# Patient Record
Sex: FEMALE | Race: WHITE | NOT HISPANIC OR LATINO | Employment: OTHER | URBAN - METROPOLITAN AREA
[De-identification: names, ages, dates, MRNs, and addresses within clinical notes are randomized per-mention and may not be internally consistent; named-entity substitution may affect disease eponyms.]

---

## 2017-01-06 ENCOUNTER — OFFICE VISIT (OUTPATIENT)
Dept: MAMMOGRAPHY | Facility: CLINIC | Age: 82
End: 2017-01-06

## 2017-01-06 VITALS
TEMPERATURE: 97.5 F | HEART RATE: 83 BPM | OXYGEN SATURATION: 99 % | DIASTOLIC BLOOD PRESSURE: 72 MMHG | SYSTOLIC BLOOD PRESSURE: 125 MMHG

## 2017-01-06 DIAGNOSIS — R23.4 BREAST SKIN CHANGES: Primary | ICD-10-CM

## 2017-01-06 PROCEDURE — 99213 OFFICE O/P EST LOW 20 MIN: CPT | Performed by: SURGERY

## 2017-01-06 NOTE — LETTER
January 6, 2017     Gutierrez Jha MD  [email or web address] [place] [address]    Patient: Vicky Stewart   YOB: 1933   Date of Visit: 1/6/2017       Dear Dr. Abhijeet MD:    Thank you for referring Vicky Stewart to me for evaluation. Below are the relevant portions of my assessment and plan of care.    If you have questions, please do not hesitate to call me. I look forward to following Vicky along with you.         Sincerely,        Melquiades Olson MD        CC: No Recipients  Melquiades Olson MD  1/6/2017 10:19 AM  Signed  Subjective   Vicky Stewart is a 83 y.o. female     History of Present Illness I last saw her in September following a skin punch biopsy of the left breast for some persistent redness of the skin.  The pathology report did not show anything to suggest inflammatory cancer of the breast.  She is actually seen her dermatologist since then.  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  After conferring with the pathologists it was suggested that a 6 mm skin punch biopsy be performed to get a better section of tissue.  This would be done to mainly confirm the diagnosis of dermal angiomatosis but this is a benign situation and there was not much to do about it anyway.  The patient does feel that the right breast is also demonstrating some of the same faint redness.  She has not detected any changes to the nipple areolar complex or felt any masses.  She is due to get her screening mammograms at the end of this month.        Review of Systems  Past Medical History   Diagnosis Date   • Anemia    • Atrial fibrillation    • Atrial flutter    • AV block, 3rd degree    • Carotid artery stenosis    • Dermatitis    • GERD (gastroesophageal reflux disease)    • History of EKG 06/17/2016   • Hyperlipidemia    • Hypertension    • Osteoarthritis      knee   • Osteoarthritis    • Pacemaker    • Palpitations    • Raynaud's phenomenon    • SOB  (shortness of breath) on exertion    • SSS (sick sinus syndrome)    • URI, acute    • Venous insufficiency      Past Surgical History   Procedure Laterality Date   • Cholecystectomy     • Coronary stent placement       choledochal   • Colonoscopy  09/2012   • Other surgical history       interrogation of implantable loop recorder remote, up to 30 days   • Cardiac electrophysiology procedure N/A 9/14/2016     Procedure: Lead Revision PPM BOSTON;  Surgeon: Kale Méndez MD;  Location: [facility] INVASIVE LOCATION;  Service:    • Sinus surgery       Family History   Problem Relation Age of Onset   • Heart disease Father    • Diabetes Sister    • Hodgkin's lymphoma Sister    • Heart disease Other    • Stroke Other    • Endometrial cancer Sister      Social History     Social History   • Marital status:      Spouse name: N/A   • Number of children: N/A   • Years of education: N/A     Occupational History   • Not on file.     Social History Main Topics   • Smoking status: Former Smoker     Packs/day: 1.50     Start date: 1951     Quit date: 2011   • Smokeless tobacco: Never Used      Comment: SMOKED 1 1/2 PACKS A DAY FOR 45YEARS  QUIT SMOKING 5 YEARS AGO   • Alcohol use No   • Drug use: No   • Sexual activity: Defer      Comment:      Other Topics Concern   • Not on file     Social History Narrative       Objective   Physical Exam  Gen.-well-nourished well-developed obese white female in no acute distress  Left breast-there continues to be some faint redness from the 7 to 10:00 positions.  This does not kathia easily and I do not detect any peau d'orange or skin thickening or changes to the appearance of the nipple areolar complex.  There is also no evidence of axillary lymphadenopathy.  Right breast-there is faint redness from the 2 to 5:00 positions of this breast.  Again it is not blanching and there are a few visible telangiectatic vessels superficially and the skin of this area.  I do not detect any  evidence of peau d'orange or skin thickening.  The axilla is also free of lymphadenopathy  Extremities-there is no evidence of upper extremity lymphedema on either side    Assessment/Plan  this picture certainly does not seem consistent with inflammatory cancer of the breast.  The fact that we are seeing changes in the other breast is actually probably more reassuring that it is concerning.  I would certainly not do anything at this point in time until her yearly mammography is completed.  That could potentially direct us to a more specific area.  We can always consider doing a 6 mm skin punch biopsy but for now I would wait and evaluate her breasts.  She will call me after the mammograms are completed so we can discuss the findings and plan our next steps.  The encounter diagnosis was Breast skin changes.

## 2017-01-06 NOTE — MR AVS SNAPSHOT
Vicky GASTELUM Pat   1/6/2017 9:45 AM   Office Visit    Dept Phone:  [phone]   Encounter #:  [id number]    Provider:  Melquiades Olson MD   Department:  [facility] BREAST SURGERY                Your Full Care Plan              Your Updated Medication List          This list is accurate as of: 1/6/17 10:07 AM.  Always use your most recent med list.                acetaminophen 325 MG tablet   Commonly known as:  TYLENOL       atorvastatin 40 MG tablet   Commonly known as:  LIPITOR       BREO ELLIPTA 100-25 MCG/INH aerosol powder    Generic drug:  Fluticasone Furoate-Vilanterol       carbidopa-levodopa  MG per tablet   Commonly known as:  SINEMET       fluticasone 50 MCG/ACT nasal spray   Commonly known as:  FLONASE       furosemide 40 MG tablet   Commonly known as:  LASIX   TAKE 1 TABLET EVERY DAY       loratadine 10 MG tablet   Commonly known as:  CLARITIN       losartan 100 MG tablet   Commonly known as:  COZAAR       omeprazole 20 MG capsule   Commonly known as:  priLOSEC       pyridoxine 200 MG tablet   Commonly known as:  VITAMIN B-6       saccharomyces boulardii 250 MG capsule   Commonly known as:  FLORASTOR       temazepam 15 MG capsule   Commonly known as:  RESTORIL       VENTOLIN  (90 BASE) MCG/ACT inhaler   Generic drug:  albuterol       vitamin D3 5000 UNITS capsule capsule       warfarin 5 MG tablet   Commonly known as:  COUMADIN   TAKE ONE TABLET BY MOUTH DAILY AS DIRECTED               Instructions     None    Patient Instructions History      Upcoming Appointments     Visit Type Date Time Department    FOLLOW UP 1/6/2017  9:45 AM [facility] BREAST SURG [facility]    CT [facility] CHEST WO CONTRAST 1/11/2017 11:00 AM  [facility] CT    PACEMAKER ICD - REMOTE 1/27/2017 12:00 AM [facility]    FOLLOW UP 10/27/2017 11:00 AM [facility] [place]      MyChart Signup     [facility] MyChart allows you to send messages to your doctor, view your test results,  renew your prescriptions, schedule appointments, and more. To sign up, go to [email or web address] and click on the Sign Up Now link in the New User? box. Enter your [employer] Activation Code exactly as it appears below along with the last four digits of your Social Security Number and your Date of Birth () to complete the sign-up process. If you do not sign up before the expiration date, you must request a new code.    S[employer]ie Activation Code: [email or web address]  Expires: 2017 10:07 AM    If you have questions, you can email [email or web address] or call [phone] to talk to our [employer] staff. Remember, S[employer]ie is NOT to be used for urgent needs. For medical emergencies, dial 911.               Other Info from Your Visit           Your Appointments     2017 11:00 AM EST   CT gayle chest wo contrast with GAYLE CT 3   [facility] CT ([place])    [address] [facility] [address] [phone]           Bring current list of meds Please arrive 15 minutes prior to exam            2017 12:00 AM EST   PACEMAKER - REMOTE with RUTHIE GAMINO [facility] CARDIOLOGY (--)    [address] [id number] [phone]            Oct 27, 2017 11:00 AM EDT   Follow Up with Kale Méndez MD   [facility] CARDIOLOGY (--)    [address] [id number] [phone]           Arrive 15 minutes prior to appointment.              Allergies     Codeine      Penicillins      Sulfa Antibiotics        Reason for Visit     Follow-up red breast      Vital Signs     Blood Pressure Pulse Temperature Oxygen Saturation Smoking Status       125/72 (BP Location: Left arm) 83 97.5 °F (36.4 °C) 99% Former Smoker

## 2017-01-06 NOTE — PROGRESS NOTES
Subjective   Vicky Stewart is a 83 y.o. female     History of Present Illness I last saw her in September following a skin punch biopsy of the left breast for some persistent redness of the skin.  The pathology report did not show anything to suggest inflammatory cancer of the breast.  She is actually seen her dermatologist since then.  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  After conferring with the pathologists it was suggested that a 6 mm skin punch biopsy be performed to get a better section of tissue.  This would be done to mainly confirm the diagnosis of dermal angiomatosis but this is a benign situation and there was not much to do about it anyway.  The patient does feel that the right breast is also demonstrating some of the same faint redness.  She has not detected any changes to the nipple areolar complex or felt any masses.  She is due to get her screening mammograms at the end of this month.        Review of Systems  Past Medical History   Diagnosis Date   • Anemia    • Atrial fibrillation    • Atrial flutter    • AV block, 3rd degree    • Carotid artery stenosis    • Dermatitis    • GERD (gastroesophageal reflux disease)    • History of EKG 06/17/2016   • Hyperlipidemia    • Hypertension    • Osteoarthritis      knee   • Osteoarthritis    • Pacemaker    • Palpitations    • Raynaud's phenomenon    • SOB (shortness of breath) on exertion    • SSS (sick sinus syndrome)    • URI, acute    • Venous insufficiency      Past Surgical History   Procedure Laterality Date   • Cholecystectomy     • Coronary stent placement       choledochal   • Colonoscopy  09/2012   • Other surgical history       interrogation of implantable loop recorder remote, up to 30 days   • Cardiac electrophysiology procedure N/A 9/14/2016     Procedure: Lead Revision PPM [place]ON;  Surgeon: Kale Méndez MD;  Location:  INVASIVE LOCATION;  Service:    • Sinus surgery       Family History   Problem  Relation Age of Onset   • Heart disease Father    • Diabetes Sister    • Hodgkin's lymphoma Sister    • Heart disease Other    • Stroke Other    • Endometrial cancer Sister      Social History     Social History   • Marital status:      Spouse name: N/A   • Number of children: N/A   • Years of education: N/A     Occupational History   • Not on file.     Social History Main Topics   • Smoking status: Former Smoker     Packs/day: 1.50     Start date: 1951     Quit date: 2011   • Smokeless tobacco: Never Used      Comment: SMOKED 1 1/2 PACKS A DAY FOR 45YEARS  QUIT SMOKING 5 YEARS AGO   • Alcohol use No   • Drug use: No   • Sexual activity: Defer      Comment:      Other Topics Concern   • Not on file     Social History Narrative       Objective   Physical Exam  Gen.-well-nourished well-developed obese white female in no acute distress  Left breast-there continues to be some faint redness from the 7 to 10:00 positions.  This does not kathia easily and I do not detect any peau d'orange or skin thickening or changes to the appearance of the nipple areolar complex.  There is also no evidence of axillary lymphadenopathy.  Right breast-there is faint redness from the 2 to 5:00 positions of this breast.  Again it is not blanching and there are a few visible telangiectatic vessels superficially and the skin of this area.  I do not detect any evidence of peau d'orange or skin thickening.  The axilla is also free of lymphadenopathy  Extremities-there is no evidence of upper extremity lymphedema on either side    Assessment/Plan  this picture certainly does not seem consistent with inflammatory cancer of the breast.  The fact that we are seeing changes in the other breast is actually probably more reassuring that it is concerning.  I would certainly not do anything at this point in time until her yearly mammography is completed.  That could potentially direct us to a more specific area.  We can always consider  doing a 6 mm skin punch biopsy but for now I would wait and evaluate her breasts.  She will call me after the mammograms are completed so we can discuss the findings and plan our next steps.  The encounter diagnosis was Breast skin changes.

## 2017-01-11 ENCOUNTER — HOSPITAL ENCOUNTER (OUTPATIENT)
Dept: CT IMAGING | Facility: HOSPITAL | Age: 82
Discharge: HOME | End: 2017-01-11
Attending: INTERNAL MEDICINE | Admitting: INTERNAL MEDICINE

## 2017-01-11 ENCOUNTER — HOSPITAL ENCOUNTER (OUTPATIENT)
Dept: CARDIOLOGY | Facility: HOSPITAL | Age: 82
Setting detail: RECURRING SERIES
Discharge: HOME | End: 2017-01-11

## 2017-01-11 DIAGNOSIS — R91.1 LUNG NODULE: ICD-10-CM

## 2017-01-11 PROCEDURE — 36416 COLLJ CAPILLARY BLOOD SPEC: CPT | Performed by: INTERNAL MEDICINE

## 2017-01-11 PROCEDURE — 85610 PROTHROMBIN TIME: CPT | Performed by: INTERNAL MEDICINE

## 2017-01-11 PROCEDURE — 71250 CT THORAX DX C-: CPT

## 2017-01-25 ENCOUNTER — APPOINTMENT (OUTPATIENT)
Dept: WOMENS IMAGING | Facility: HOSPITAL | Age: 82
End: 2017-01-25

## 2017-01-25 PROCEDURE — G0202 SCR MAMMO BI INCL CAD: HCPCS | Performed by: RADIOLOGY

## 2017-01-27 ENCOUNTER — CLINICAL SUPPORT NO REQUIREMENTS (OUTPATIENT)
Dept: CARDIOLOGY | Facility: CLINIC | Age: 82
End: 2017-01-27

## 2017-01-27 DIAGNOSIS — I44.2 THIRD DEGREE AV BLOCK (HCC): ICD-10-CM

## 2017-01-27 DIAGNOSIS — I48.20 CHRONIC ATRIAL FIBRILLATION (HCC): Primary | ICD-10-CM

## 2017-01-27 PROCEDURE — 93296 REM INTERROG EVL PM/IDS: CPT | Performed by: INTERNAL MEDICINE

## 2017-01-27 PROCEDURE — 93294 REM INTERROG EVL PM/LDLS PM: CPT | Performed by: INTERNAL MEDICINE

## 2017-02-08 ENCOUNTER — HOSPITAL ENCOUNTER (OUTPATIENT)
Dept: CARDIOLOGY | Facility: HOSPITAL | Age: 82
Setting detail: RECURRING SERIES
Discharge: HOME | End: 2017-02-08

## 2017-02-08 PROCEDURE — 85610 PROTHROMBIN TIME: CPT

## 2017-02-08 PROCEDURE — 36416 COLLJ CAPILLARY BLOOD SPEC: CPT

## 2017-02-15 ENCOUNTER — TELEPHONE (OUTPATIENT)
Dept: MAMMOGRAPHY | Facility: CLINIC | Age: 82
End: 2017-02-15

## 2017-02-15 NOTE — TELEPHONE ENCOUNTER
The mammogram still shows a little skin thickening but it has decreased since the last image.  The patient tells me the area of redness is stable.  I have asked her to call the office tomorrow and schedule an appointment for 3 months.  She knows to call me sooner if there are any changes going on

## 2017-02-16 ENCOUNTER — HOSPITAL ENCOUNTER (OUTPATIENT)
Dept: CARDIOLOGY | Facility: HOSPITAL | Age: 82
Setting detail: RECURRING SERIES
Discharge: HOME | End: 2017-02-16

## 2017-02-16 PROCEDURE — 85610 PROTHROMBIN TIME: CPT

## 2017-02-16 PROCEDURE — 36416 COLLJ CAPILLARY BLOOD SPEC: CPT

## 2017-03-07 ENCOUNTER — HOSPITAL ENCOUNTER (OUTPATIENT)
Dept: CARDIOLOGY | Facility: HOSPITAL | Age: 82
Setting detail: RECURRING SERIES
Discharge: HOME | End: 2017-03-07

## 2017-03-07 PROCEDURE — 85610 PROTHROMBIN TIME: CPT

## 2017-03-07 PROCEDURE — 36416 COLLJ CAPILLARY BLOOD SPEC: CPT

## 2017-04-05 ENCOUNTER — HOSPITAL ENCOUNTER (OUTPATIENT)
Dept: CARDIOLOGY | Facility: HOSPITAL | Age: 82
Setting detail: RECURRING SERIES
Discharge: HOME | End: 2017-04-05

## 2017-04-05 PROCEDURE — 36416 COLLJ CAPILLARY BLOOD SPEC: CPT

## 2017-04-05 PROCEDURE — 85610 PROTHROMBIN TIME: CPT

## 2017-04-20 ENCOUNTER — TRANSCRIBE ORDERS (OUTPATIENT)
Dept: ADMINISTRATIVE | Facility: HOSPITAL | Age: 82
End: 2017-04-20

## 2017-04-20 DIAGNOSIS — M54.16 LUMBAR BACK PAIN WITH RADICULOPATHY AFFECTING LEFT LOWER EXTREMITY: Primary | ICD-10-CM

## 2017-04-20 DIAGNOSIS — M54.16 LUMBAR BACK PAIN WITH RADICULOPATHY AFFECTING RIGHT LOWER EXTREMITY: ICD-10-CM

## 2017-04-21 ENCOUNTER — HOSPITAL ENCOUNTER (OUTPATIENT)
Dept: CT IMAGING | Facility: HOSPITAL | Age: 82
Discharge: HOME | End: 2017-04-21
Attending: INTERNAL MEDICINE | Admitting: INTERNAL MEDICINE

## 2017-04-21 DIAGNOSIS — M54.16 LUMBAR BACK PAIN WITH RADICULOPATHY AFFECTING RIGHT LOWER EXTREMITY: ICD-10-CM

## 2017-04-21 DIAGNOSIS — M54.16 LUMBAR BACK PAIN WITH RADICULOPATHY AFFECTING LEFT LOWER EXTREMITY: ICD-10-CM

## 2017-04-21 PROCEDURE — 72131 CT LUMBAR SPINE W/O DYE: CPT

## 2017-04-25 ENCOUNTER — HOSPITAL ENCOUNTER (OUTPATIENT)
Dept: CARDIOLOGY | Facility: HOSPITAL | Age: 82
Setting detail: RECURRING SERIES
Discharge: HOME | End: 2017-04-25

## 2017-04-25 PROCEDURE — 36416 COLLJ CAPILLARY BLOOD SPEC: CPT

## 2017-04-25 PROCEDURE — 85610 PROTHROMBIN TIME: CPT

## 2017-04-28 ENCOUNTER — TELEPHONE (OUTPATIENT)
Dept: CARDIOLOGY | Facility: CLINIC | Age: 82
End: 2017-04-28

## 2017-04-28 ENCOUNTER — CLINICAL SUPPORT NO REQUIREMENTS (OUTPATIENT)
Dept: CARDIOLOGY | Facility: CLINIC | Age: 82
End: 2017-04-28

## 2017-04-28 DIAGNOSIS — I48.20 CHRONIC ATRIAL FIBRILLATION (HCC): ICD-10-CM

## 2017-04-28 DIAGNOSIS — I44.2 THIRD DEGREE AV BLOCK (HCC): Primary | ICD-10-CM

## 2017-04-28 PROCEDURE — 93294 REM INTERROG EVL PM/LDLS PM: CPT | Performed by: INTERNAL MEDICINE

## 2017-04-28 PROCEDURE — 93296 REM INTERROG EVL PM/IDS: CPT | Performed by: INTERNAL MEDICINE

## 2017-04-28 NOTE — TELEPHONE ENCOUNTER
Pt is pacer dependent.  She had a nst episode earlier this month.  It was 22 beats and irregular, suspicious for af.  This was the only episode she had recorded since her last check, but she did have a similar episode last check as well.  KBR

## 2017-05-05 ENCOUNTER — TRANSCRIBE ORDERS (OUTPATIENT)
Dept: PHYSICAL THERAPY | Facility: HOSPITAL | Age: 82
End: 2017-05-05

## 2017-05-05 DIAGNOSIS — M54.5 LOW BACK PAIN, UNSPECIFIED BACK PAIN LATERALITY, UNSPECIFIED CHRONICITY, WITH SCIATICA PRESENCE UNSPECIFIED: Primary | ICD-10-CM

## 2017-05-11 ENCOUNTER — HOSPITAL ENCOUNTER (OUTPATIENT)
Dept: CARDIOLOGY | Facility: HOSPITAL | Age: 82
Setting detail: RECURRING SERIES
Discharge: HOME | End: 2017-05-11

## 2017-05-11 ENCOUNTER — OFFICE VISIT (OUTPATIENT)
Dept: NEUROSURGERY | Facility: CLINIC | Age: 82
End: 2017-05-11

## 2017-05-11 VITALS
WEIGHT: 175 LBS | DIASTOLIC BLOOD PRESSURE: 88 MMHG | BODY MASS INDEX: 32.2 KG/M2 | HEART RATE: 84 BPM | HEIGHT: 62 IN | SYSTOLIC BLOOD PRESSURE: 130 MMHG

## 2017-05-11 DIAGNOSIS — M41.20 SCOLIOSIS (AND KYPHOSCOLIOSIS), IDIOPATHIC: ICD-10-CM

## 2017-05-11 DIAGNOSIS — M47.26 OSTEOARTHRITIS OF SPINE WITH RADICULOPATHY, LUMBAR REGION: Primary | ICD-10-CM

## 2017-05-11 DIAGNOSIS — M51.36 DEGENERATIVE DISC DISEASE, LUMBAR: ICD-10-CM

## 2017-05-11 PROBLEM — M51.369 DEGENERATIVE DISC DISEASE, LUMBAR: Status: ACTIVE | Noted: 2017-05-11

## 2017-05-11 PROCEDURE — 36416 COLLJ CAPILLARY BLOOD SPEC: CPT

## 2017-05-11 PROCEDURE — 85610 PROTHROMBIN TIME: CPT

## 2017-05-11 PROCEDURE — 99213 OFFICE O/P EST LOW 20 MIN: CPT | Performed by: NURSE PRACTITIONER

## 2017-05-11 RX ORDER — FUROSEMIDE 40 MG/1
TABLET ORAL
Qty: 30 TABLET | Refills: 5 | Status: SHIPPED | OUTPATIENT
Start: 2017-05-11 | End: 2017-11-27 | Stop reason: SDUPTHER

## 2017-05-19 ENCOUNTER — OFFICE VISIT (OUTPATIENT)
Dept: MAMMOGRAPHY | Facility: CLINIC | Age: 82
End: 2017-05-19

## 2017-05-19 VITALS
OXYGEN SATURATION: 95 % | HEART RATE: 78 BPM | SYSTOLIC BLOOD PRESSURE: 122 MMHG | TEMPERATURE: 97.5 F | DIASTOLIC BLOOD PRESSURE: 80 MMHG

## 2017-05-19 DIAGNOSIS — R23.4 BREAST SKIN CHANGES: Primary | ICD-10-CM

## 2017-05-19 PROCEDURE — 99213 OFFICE O/P EST LOW 20 MIN: CPT | Performed by: SURGERY

## 2017-05-30 ENCOUNTER — TELEPHONE (OUTPATIENT)
Dept: CARDIOLOGY | Facility: CLINIC | Age: 82
End: 2017-05-30

## 2017-06-08 ENCOUNTER — HOSPITAL ENCOUNTER (OUTPATIENT)
Dept: CARDIOLOGY | Facility: HOSPITAL | Age: 82
Setting detail: RECURRING SERIES
Discharge: HOME | End: 2017-06-08

## 2017-06-08 PROCEDURE — 36416 COLLJ CAPILLARY BLOOD SPEC: CPT

## 2017-06-08 PROCEDURE — 85610 PROTHROMBIN TIME: CPT

## 2017-06-20 ENCOUNTER — HOSPITAL ENCOUNTER (OUTPATIENT)
Dept: CARDIOLOGY | Facility: HOSPITAL | Age: 82
Setting detail: RECURRING SERIES
Discharge: HOME | End: 2017-06-20

## 2017-06-20 PROCEDURE — 85610 PROTHROMBIN TIME: CPT

## 2017-06-20 PROCEDURE — 36416 COLLJ CAPILLARY BLOOD SPEC: CPT

## 2017-06-23 ENCOUNTER — TRANSCRIBE ORDERS (OUTPATIENT)
Dept: ADMINISTRATIVE | Facility: HOSPITAL | Age: 82
End: 2017-06-23

## 2017-06-23 ENCOUNTER — HOSPITAL ENCOUNTER (OUTPATIENT)
Dept: CT IMAGING | Facility: HOSPITAL | Age: 82
Discharge: HOME | End: 2017-06-23
Attending: INTERNAL MEDICINE | Admitting: INTERNAL MEDICINE

## 2017-06-23 DIAGNOSIS — M54.2 CERVICAL PAIN: ICD-10-CM

## 2017-06-23 DIAGNOSIS — M54.2 CERVICAL PAIN: Primary | ICD-10-CM

## 2017-06-23 PROCEDURE — 72125 CT NECK SPINE W/O DYE: CPT

## 2017-07-05 ENCOUNTER — HOSPITAL ENCOUNTER (OUTPATIENT)
Dept: CARDIOLOGY | Facility: HOSPITAL | Age: 82
Setting detail: RECURRING SERIES
Discharge: HOME | End: 2017-07-05

## 2017-07-05 ENCOUNTER — PROCEDURE VISIT (OUTPATIENT)
Dept: OBSTETRICS AND GYNECOLOGY | Facility: CLINIC | Age: 82
End: 2017-07-05

## 2017-07-05 DIAGNOSIS — M81.0 OSTEOPOROSIS: Primary | ICD-10-CM

## 2017-07-05 DIAGNOSIS — M85.80 OSTEOPENIA: ICD-10-CM

## 2017-07-05 LAB
25(OH)D3+25(OH)D2 SERPL-MCNC: 92.1 NG/ML (ref 30–100)
ALBUMIN SERPL-MCNC: 4.3 G/DL (ref 3.5–5.2)
ALBUMIN/GLOB SERPL: 2 G/DL
ALP SERPL-CCNC: 68 U/L (ref 39–117)
ALT SERPL-CCNC: 21 U/L (ref 1–33)
AST SERPL-CCNC: 24 U/L (ref 1–32)
BILIRUB SERPL-MCNC: 0.5 MG/DL (ref 0.1–1.2)
BUN SERPL-MCNC: 18 MG/DL (ref 8–23)
BUN/CREAT SERPL: 20 (ref 7–25)
CALCIUM SERPL-MCNC: 9.9 MG/DL (ref 8.6–10.5)
CHLORIDE SERPL-SCNC: 101 MMOL/L (ref 98–107)
CO2 SERPL-SCNC: 26.1 MMOL/L (ref 22–29)
CREAT SERPL-MCNC: 0.9 MG/DL (ref 0.57–1)
GLOBULIN SER CALC-MCNC: 2.1 GM/DL
GLUCOSE SERPL-MCNC: 97 MG/DL (ref 65–99)
POTASSIUM SERPL-SCNC: 4.3 MMOL/L (ref 3.5–5.2)
PROT SERPL-MCNC: 6.4 G/DL (ref 6–8.5)
SODIUM SERPL-SCNC: 142 MMOL/L (ref 136–145)

## 2017-07-05 PROCEDURE — 99212 OFFICE O/P EST SF 10 MIN: CPT | Performed by: NURSE PRACTITIONER

## 2017-07-05 PROCEDURE — 85610 PROTHROMBIN TIME: CPT

## 2017-07-05 PROCEDURE — 36416 COLLJ CAPILLARY BLOOD SPEC: CPT

## 2017-07-05 NOTE — PROGRESS NOTES
Vicky Stewart is a 83 y.o. female.   No chief complaint on file.     HPI:pt here for reclast , has not had labs drawn    The following portions of the patient's history were reviewed and updated as appropriate: allergies, current medications, past family history, past medical history, past social history, past surgical history and problem list.    Review of Systems  Review of Systems   Constitutional: Negative.    Allergic/Immunologic: Negative.    Hematological: Negative.    Psychiatric/Behavioral: Negative.        Objective   Physical Exam   Constitutional: She is oriented to person, place, and time. She appears well-developed and well-nourished.   Neurological: She is alert and oriented to person, place, and time.   Skin: Skin is warm and dry.   Nursing note and vitals reviewed.      Assessment/Plan   There are no Patient Instructions on file for this visit.    Diagnoses and all orders for this visit:    Osteoporosis        No Follow-up on file.

## 2017-07-12 ENCOUNTER — PROCEDURE VISIT (OUTPATIENT)
Dept: OBSTETRICS AND GYNECOLOGY | Facility: CLINIC | Age: 82
End: 2017-07-12

## 2017-07-12 VITALS
BODY MASS INDEX: 31.83 KG/M2 | DIASTOLIC BLOOD PRESSURE: 80 MMHG | SYSTOLIC BLOOD PRESSURE: 128 MMHG | HEIGHT: 62 IN | WEIGHT: 173 LBS

## 2017-07-12 DIAGNOSIS — M85.80 OSTEOPENIA: Primary | ICD-10-CM

## 2017-07-12 PROCEDURE — 96372 THER/PROPH/DIAG INJ SC/IM: CPT | Performed by: NURSE PRACTITIONER

## 2017-07-12 RX ORDER — ZOLEDRONIC ACID 5 MG/100ML
5 INJECTION, SOLUTION INTRAVENOUS ONCE
Status: COMPLETED | OUTPATIENT
Start: 2017-07-12 | End: 2017-07-12

## 2017-07-12 RX ADMIN — ZOLEDRONIC ACID 5 MG: 5 INJECTION, SOLUTION INTRAVENOUS at 15:26

## 2017-07-12 NOTE — PROGRESS NOTES
Vicky Stewart is a 83 y.o. female.   Chief Complaint   Patient presents with   • Procedure     Patient is here for iv reclast.      HPpt here for reclast infusion    The following portions of the patient's history were reviewed and updated as appropriate: allergies, current medications, past family history, past medical history, past social history, past surgical history and problem list.    Review of Systems  Review of Systems   Constitutional: Negative.    Allergic/Immunologic: Negative.    Hematological: Negative.    Psychiatric/Behavioral: Negative.        Objective   Physical Exam   Constitutional: She appears well-developed and well-nourished.   Neurological: She is alert.   Skin: Skin is warm and dry.   Psychiatric: She has a normal mood and affect.   Nursing note and vitals reviewed.      Assessment/Plan   There are no Patient Instructions on file for this visit.    Vicky was seen today for procedure.    Diagnoses and all orders for this visit:    Osteopenia        No Follow-up on file.

## 2017-08-01 ENCOUNTER — HOSPITAL ENCOUNTER (OUTPATIENT)
Dept: CARDIOLOGY | Facility: HOSPITAL | Age: 82
Setting detail: RECURRING SERIES
Discharge: HOME | End: 2017-08-01

## 2017-08-01 PROCEDURE — 85610 PROTHROMBIN TIME: CPT

## 2017-08-01 PROCEDURE — 36416 COLLJ CAPILLARY BLOOD SPEC: CPT

## 2017-08-04 ENCOUNTER — CLINICAL SUPPORT NO REQUIREMENTS (OUTPATIENT)
Dept: CARDIOLOGY | Facility: CLINIC | Age: 82
End: 2017-08-04

## 2017-08-04 ENCOUNTER — TELEPHONE (OUTPATIENT)
Dept: CARDIOLOGY | Facility: CLINIC | Age: 82
End: 2017-08-04

## 2017-08-04 DIAGNOSIS — I44.2 THIRD DEGREE AV BLOCK (HCC): Primary | ICD-10-CM

## 2017-08-04 PROCEDURE — 93294 REM INTERROG EVL PM/LDLS PM: CPT | Performed by: INTERNAL MEDICINE

## 2017-08-04 PROCEDURE — 93296 REM INTERROG EVL PM/IDS: CPT | Performed by: INTERNAL MEDICINE

## 2017-08-04 NOTE — TELEPHONE ENCOUNTER
Scheduled remote transmission received and reviewed. Presenting rhythm  ~ 86 bpm.  99%. Automated testing WNL showing appropriate VVIR pacemaker function.     3 VT-NS episodes, 2 with EGMs. EGMs upon initial look appear to be AF w/ RVR. However, pt is dependent and these could be long VT episodes. Episodes lasted 14 seconds and 32 beats around 150 bpm. The 2nd episode lasted 23 seconds and appears to be short runs of VT up to rate of 255 bpm with intermittent . I called pt to further discuss, but there was no answer. I requested a return call. Pt is already scheduled for in clinic visit with Dr. Méndez nd PM on 10/27/17. CTB    Dr. Méndez, I left the EGMs at your desk for review. I think the episodes are VT...

## 2017-08-30 ENCOUNTER — HOSPITAL ENCOUNTER (OUTPATIENT)
Dept: CARDIOLOGY | Facility: HOSPITAL | Age: 82
Setting detail: RECURRING SERIES
Discharge: HOME | End: 2017-08-30

## 2017-08-30 PROCEDURE — 36416 COLLJ CAPILLARY BLOOD SPEC: CPT

## 2017-08-30 PROCEDURE — 85610 PROTHROMBIN TIME: CPT

## 2017-09-12 ENCOUNTER — HOSPITAL ENCOUNTER (OUTPATIENT)
Dept: CARDIOLOGY | Facility: HOSPITAL | Age: 82
Setting detail: RECURRING SERIES
Discharge: HOME | End: 2017-09-12

## 2017-09-12 PROCEDURE — 85610 PROTHROMBIN TIME: CPT

## 2017-09-12 PROCEDURE — 36416 COLLJ CAPILLARY BLOOD SPEC: CPT

## 2017-10-10 ENCOUNTER — HOSPITAL ENCOUNTER (OUTPATIENT)
Dept: CARDIOLOGY | Facility: HOSPITAL | Age: 82
Setting detail: RECURRING SERIES
Discharge: HOME | End: 2017-10-10

## 2017-10-10 PROCEDURE — 36416 COLLJ CAPILLARY BLOOD SPEC: CPT

## 2017-10-10 PROCEDURE — 85610 PROTHROMBIN TIME: CPT

## 2017-10-27 ENCOUNTER — CLINICAL SUPPORT NO REQUIREMENTS (OUTPATIENT)
Dept: CARDIOLOGY | Facility: CLINIC | Age: 82
End: 2017-10-27

## 2017-10-27 ENCOUNTER — OFFICE VISIT (OUTPATIENT)
Dept: CARDIOLOGY | Facility: CLINIC | Age: 82
End: 2017-10-27

## 2017-10-27 VITALS
HEART RATE: 81 BPM | BODY MASS INDEX: 30.48 KG/M2 | WEIGHT: 172 LBS | DIASTOLIC BLOOD PRESSURE: 90 MMHG | SYSTOLIC BLOOD PRESSURE: 148 MMHG | HEIGHT: 63 IN

## 2017-10-27 DIAGNOSIS — I48.20 CHRONIC ATRIAL FIBRILLATION (HCC): Primary | ICD-10-CM

## 2017-10-27 DIAGNOSIS — I44.2 COMPLETE HEART BLOCK (HCC): ICD-10-CM

## 2017-10-27 DIAGNOSIS — I10 ESSENTIAL (PRIMARY) HYPERTENSION: ICD-10-CM

## 2017-10-27 DIAGNOSIS — I44.2 THIRD DEGREE AV BLOCK (HCC): ICD-10-CM

## 2017-10-27 DIAGNOSIS — Z95.0 CARDIAC PACEMAKER IN SITU: ICD-10-CM

## 2017-10-27 PROCEDURE — 99213 OFFICE O/P EST LOW 20 MIN: CPT | Performed by: INTERNAL MEDICINE

## 2017-10-27 PROCEDURE — 93000 ELECTROCARDIOGRAM COMPLETE: CPT | Performed by: INTERNAL MEDICINE

## 2017-10-27 PROCEDURE — 93279 PRGRMG DEV EVAL PM/LDLS PM: CPT | Performed by: INTERNAL MEDICINE

## 2017-10-27 RX ORDER — INFLUENZA A VIRUS A/MICHIGAN/45/2015 X-275 (H1N1) ANTIGEN (FORMALDEHYDE INACTIVATED), INFLUENZA A VIRUS A/SINGAPORE/INFIMH-16-0019/2016 IVR-186 (H3N2) ANTIGEN (FORMALDEHYDE INACTIVATED), AND INFLUENZA B VIRUS B/MARYLAND/15/2016 BX-69A (A B/COLORADO/6/2017-LIKE VIRUS) ANTIGEN (FORMALDEHYDE INACTIVATED) 60; 60; 60 UG/.5ML; UG/.5ML; UG/.5ML
INJECTION, SUSPENSION INTRAMUSCULAR
Refills: 0 | COMMUNITY
Start: 2017-09-14 | End: 2020-03-17 | Stop reason: HOSPADM

## 2017-10-27 NOTE — PROGRESS NOTES
"Date of Office Visit: 10/27/2017  Encounter Provider: Kale Méndez MD  Place of Service: [facility] CARDIOLOGY  Patient Name: Vicky Stewart  : 10/18/1933    Subjective:     Encounter Date:10/27/2017      Patient ID: Vicky Stewart is a 84 y.o. female who has a cc of af and av node ablation and pacer   She has been traveling and no cardiac issues.   \"My breathing is great.\"   Occ pain in jaw but lasts only min and non-exertional.     The patient had a good year.   No anginal chest pain,   No sig ramon,   No soa,   No fainting,  No orthostasis.   No edema.   Exercise tolerance: decent (walks w cane)     There have been no hospital admission since the last visit.     There have been no bleeding events.       Past Medical History:   Diagnosis Date   • Anemia    • Atrial fibrillation    • Atrial flutter    • AV block, 3rd degree    • Carotid artery stenosis    • Dermatitis    • GERD (gastroesophageal reflux disease)    • History of EKG 2016   • Hyperlipidemia    • Hypertension    • Osteoarthritis     knee   • Osteoarthritis    • Pacemaker    • Palpitations    • Raynaud's phenomenon    • SOB (shortness of breath) on exertion    • SSS (sick sinus syndrome)    • URI, acute    • Venous insufficiency        Social History     Social History   • Marital status:      Spouse name: N/A   • Number of children: N/A   • Years of education: N/A     Occupational History   • Not on file.     Social History Main Topics   • Smoking status: Former Smoker     Packs/day: 1.50     Start date:      Quit date:    • Smokeless tobacco: Never Used      Comment: SMOKED 1 1/2 PACKS A DAY FOR 45YEARS  QUIT SMOKING 5 YEARS AGO   • Alcohol use No   • Drug use: No   • Sexual activity: Defer      Comment:      Other Topics Concern   • Not on file     Social History Narrative       Review of Systems   Constitution: Negative for fever and night sweats.   HENT: Negative for ear pain and " "stridor.    Eyes: Negative for discharge and visual halos.   Cardiovascular: Negative for cyanosis.   Respiratory: Negative for hemoptysis and sputum production.    Hematologic/Lymphatic: Negative for adenopathy.   Skin: Negative for nail changes and unusual hair distribution.   Musculoskeletal: Negative for gout and joint swelling.   Gastrointestinal: Negative for bowel incontinence and flatus.   Genitourinary: Negative for dysuria and flank pain.   Neurological: Negative for seizures and tremors.   Psychiatric/Behavioral: Negative for altered mental status. The patient is not nervous/anxious.             Objective:     Vitals:    10/27/17 1043   BP: 148/90   Pulse: 81   Weight: 172 lb (78 kg)   Height: 62.5\" (158.8 cm)         Physical Exam   Constitutional: She is oriented to person, place, and time.   HENT:   Head: Normocephalic and atraumatic.   Eyes: Right eye exhibits no discharge. Left eye exhibits no discharge.   Neck: No JVD present. No thyromegaly present.   Cardiovascular: Normal rate and regular rhythm.  Exam reveals no gallop and no friction rub.    No murmur heard.  Pulmonary/Chest: Effort normal and breath sounds normal. She has no rales.   Abdominal: Soft. Bowel sounds are normal. There is no tenderness.   Musculoskeletal: Normal range of motion. She exhibits no edema or deformity.   Neurological: She is alert and oriented to person, place, and time. She exhibits normal muscle tone.   Skin: Skin is warm and dry. No erythema.   Psychiatric: She has a normal mood and affect. Her behavior is normal. Thought content normal.         ECG 12 Lead  Date/Time: 10/27/2017 11:07 AM  Performed by: SHILO KRAUS  Authorized by: SHILO KRAUS   Comparison: compared with previous ECG   Rhythm: atrial fibrillation and paced  Clinical impression: abnormal ECG            Lab Review:       Assessment:          Diagnosis Plan   1. Chronic atrial fibrillation     2. Third degree AV block     3. Cardiac pacemaker in " situ     4. Essential (primary) hypertension            Plan:       SHe reports no concerning symptoms  Exam is great -- no congestion in lungs or legs  ECG and Pacer look good.  Warfarin is doing ok   No orthostasis or hypotension on losartan

## 2017-11-09 ENCOUNTER — HOSPITAL ENCOUNTER (OUTPATIENT)
Dept: CARDIOLOGY | Facility: HOSPITAL | Age: 82
Setting detail: RECURRING SERIES
Discharge: HOME | End: 2017-11-09

## 2017-11-09 PROCEDURE — 85610 PROTHROMBIN TIME: CPT

## 2017-11-09 PROCEDURE — 36416 COLLJ CAPILLARY BLOOD SPEC: CPT

## 2017-11-13 ENCOUNTER — TELEPHONE (OUTPATIENT)
Dept: NEUROSURGERY | Facility: CLINIC | Age: 82
End: 2017-11-13

## 2017-11-13 DIAGNOSIS — M54.16 LUMBAR RADICULOPATHY: Primary | ICD-10-CM

## 2017-11-13 NOTE — TELEPHONE ENCOUNTER
I have requested the notes from Dr. Jha and the PT notes (patient essentially had near complete resolution of her symptoms after PT and oral steroids by time of last evaluation in May - perhaps she's headed that way again?). Probably should start with APRN appt.

## 2017-11-13 NOTE — TELEPHONE ENCOUNTER
Patient was last seen on 5/11/17 as a new patient for DDD,lumbar by Jo Ann.  Danielle [phone]  from Dr Jha's office called and said that the patient lifted a box and is now hurting in her back and legs. Pt has not had an y new maging done. Pt is in PT and the therapist suggested see she a back doctor. Pt only wants to see a doctor not a NP.  Does she need any  imaging and who can I schedule her with please.  Please advise

## 2017-11-13 NOTE — TELEPHONE ENCOUNTER
LVM asking patient to call back to schedule appt ([facility] first available is 12-22 at this time). Order in for xrays.     Patient called back. I arranged appt w/ Cheyanne 11-30 at 1:00. She'll get imaging on 11/27.

## 2017-11-13 NOTE — TELEPHONE ENCOUNTER
Sounds like she should see NP first, but if she refuses, then we should accomodate her request. Lumbar xrays with flex/ext.

## 2017-11-16 ENCOUNTER — HOSPITAL ENCOUNTER (OUTPATIENT)
Dept: GENERAL RADIOLOGY | Facility: HOSPITAL | Age: 82
Discharge: HOME | End: 2017-11-16
Attending: NEUROLOGICAL SURGERY | Admitting: NEUROLOGICAL SURGERY

## 2017-11-16 DIAGNOSIS — M54.16 LUMBAR RADICULOPATHY: ICD-10-CM

## 2017-11-16 PROCEDURE — 72114 X-RAY EXAM L-S SPINE BENDING: CPT

## 2017-11-16 NOTE — TELEPHONE ENCOUNTER
Pt called states that she is in even more excruciating pain. Unable to ambulate without cane, denies incontinence or severe weakness of BLE but states that when she presses her foot down (like to press down a gas peddle) she has even more intense pain. Patient states that she had xray imaging today.  KK reviewed imaging: suspicious for compression fx, but due to scoliosis and severe osteoporosis, unable to make the determination at this time. Will look at the report in the AM and get back to the patient. Patient voiced understanding. Has pacemaker, cannot have MRIs, and is on blood thinners.

## 2017-11-17 ENCOUNTER — OFFICE VISIT (OUTPATIENT)
Dept: NEUROSURGERY | Facility: CLINIC | Age: 82
End: 2017-11-17

## 2017-11-17 VITALS
HEIGHT: 63 IN | BODY MASS INDEX: 30.12 KG/M2 | HEART RATE: 80 BPM | DIASTOLIC BLOOD PRESSURE: 60 MMHG | WEIGHT: 170 LBS | SYSTOLIC BLOOD PRESSURE: 122 MMHG

## 2017-11-17 DIAGNOSIS — M47.26 OSTEOARTHRITIS OF SPINE WITH RADICULOPATHY, LUMBAR REGION: Primary | ICD-10-CM

## 2017-11-17 DIAGNOSIS — M41.20 SCOLIOSIS (AND KYPHOSCOLIOSIS), IDIOPATHIC: ICD-10-CM

## 2017-11-17 PROCEDURE — 99214 OFFICE O/P EST MOD 30 MIN: CPT | Performed by: NURSE PRACTITIONER

## 2017-11-17 RX ORDER — TEMAZEPAM 7.5 MG/1
7.5 CAPSULE ORAL
Refills: 1 | COMMUNITY
Start: 2017-11-11

## 2017-11-17 NOTE — PROGRESS NOTES
Subjective   Patient ID: Vicky Stewart is a 84 y.o. female who is here today for follow-up for low back pain. She had lumbar xrays on 11/16/17. She presents unaccompanied.     History of Present Illness   Patient presents with recurrence of low back pain.  Previously she had complete resolution of discomfort with physical therapy and steroid dose pack.  About one month ago, she was taking down a grandmother clock and it was heavier than she expected, this was followed by lifting an end table a few days later. She began to have progressive midline and right low back pain. It is grabbing intermittent pain, aggravated by sitting and position change and most relieved by standing. It is associated with right outer thigh pain.  She denies any groin pain.  She went to PT with no relief and she tried a steroid dose hood with no relief.       The following portions of the patient's history were reviewed and updated as appropriate: allergies, current medications and problem list.    Review of Systems   Constitutional: Positive for fatigue.   Musculoskeletal: Positive for arthralgias (RLE), back pain and gait problem.   Neurological: Negative for weakness and numbness.       Objective   Physical Exam   Constitutional: She is oriented to person, place, and time. She appears well-developed and well-nourished.   Pulmonary/Chest: Effort normal.   Musculoskeletal:        Right hip: She exhibits normal range of motion and no tenderness.        Thoracic back: She exhibits deformity. She exhibits no tenderness and no bony tenderness.        Lumbar back: She exhibits tenderness (right lower back/upper buttock along margin of ischial bone; negative SI joint tenderness) and deformity. She exhibits no bony tenderness and no pain (negative SLR).   Neurological: She is oriented to person, place, and time. She has normal strength. Gait normal.   Reflex Scores:       Patellar reflexes are 2+ on the right side and 1+ on the left  side.  Vitals reviewed.    Neurologic Exam     Mental Status   Oriented to person, place, and time.   Level of consciousness: alert    Motor Exam   Muscle bulk: normal  Right leg tone: normal  Left leg tone: normal    Strength   Strength 5/5 throughout.     Sensory Exam   Right leg light touch: normal  Left leg light touch: normal    Gait, Coordination, and Reflexes     Gait  Gait: normal    Reflexes   Right patellar: 2+  Left patellar: 1+       Able to heel and toe walk as well as squat to stand unassisted without significant pain       Assessment/Plan   Independent Review of Radiographic Studies:    Lumbar x-[facility] from [facility] dated November 16, 2017 were reviewed.  These reveal severe osteoporosis with mild superior endplate compression deformity at T10.  There is disc space narrowing at multiple levels.  There is significant levoscoliosis centered at L3/4 with disc space narrowing to the right.  There is multilevel degenerative changes otherwise.  There does not appear to be any significant flexion-extension abnormalities or pars defects.    Medical Decision Making:    Patient presents with new/recurrent back pain.  It is centered in the right low back and in the outer thigh.  She did not improve with conservative measures including steroid dose pack and physical therapy.  She cannot have an MRI secondary to pacemaker.  I was concerned that her complaint may be related to a suspected T10 compression deformity, but she has no pain in the thoracic spine.     Her exam is as noted above with no neurologic red flags.  Her strength, sensation, reflexes are intact.  She has negative straight leg raise and Henry.  There is no pin point tenderness in the SI joint.  I think her pain may be related to her scoliosis and possibly facet disease specifically on the right at L3 4.      I recommended lumbar epidural injection ×3.  Also recommended a trial gabapentin with the patient deferred this to she's  concerned about side effects.  She states she does not do well with most sedating medications.  If her symptoms do not improve with the second epidural she should not get the third.  We will plan to see her back in 6 weeks but if she fails to improve or worsen she will contact us and we will consider a CT/myelogram at that time.  I did give her a brace as I fear that where her discomfort is in the right upper buttock, a brace would only make that worse.  Additionally she has absolute no tenderness to palpation/percussion or movement in her thoracic spine so the compression deformity could be chronic or could be a misleading view due to the osteoporosis and scoliosis.    Plan: Lumbar epidural injection ×3 and return to office following    Vicky was seen today for back pain.    Diagnoses and all orders for this visit:    Osteoarthritis of spine with radiculopathy, lumbar region  -     Ambulatory Referral to Hospital Pain Management Department    Scoliosis (and kyphoscoliosis), idiopathic  -     Ambulatory Referral to Hospital Pain Management Department      Return in about 6 weeks (around 12/29/2017) for Follow-up with NP.

## 2017-11-17 NOTE — TELEPHONE ENCOUNTER
Per KK after reviewing xray report, suspicious for fx, needs appt moved up to today at 3:30. Called patient, will be here then.

## 2017-11-20 ENCOUNTER — TELEPHONE (OUTPATIENT)
Dept: CARDIOLOGY | Facility: CLINIC | Age: 82
End: 2017-11-20

## 2017-11-20 NOTE — TELEPHONE ENCOUNTER
Dr. Porsche Lang called regarding Ms. Stewart.  She would like to know if pt can hold her Warfarin for 7 days prior to having an epidural on November 30th?  Ms. Stewart has a compression fracture.    Please advise/jlf    Dr. Porsche Lang # [phone]

## 2017-11-27 RX ORDER — FUROSEMIDE 40 MG/1
TABLET ORAL
Qty: 30 TABLET | Refills: 5 | Status: SHIPPED | OUTPATIENT
Start: 2017-11-27 | End: 2018-06-21 | Stop reason: SDUPTHER

## 2017-11-30 ENCOUNTER — HOSPITAL ENCOUNTER (OUTPATIENT)
Dept: PAIN MEDICINE | Facility: HOSPITAL | Age: 82
Discharge: HOME | End: 2017-11-30
Admitting: NURSE PRACTITIONER

## 2017-11-30 ENCOUNTER — ANESTHESIA (OUTPATIENT)
Dept: PAIN MEDICINE | Facility: HOSPITAL | Age: 82
End: 2017-11-30

## 2017-11-30 ENCOUNTER — ANESTHESIA EVENT (OUTPATIENT)
Dept: PAIN MEDICINE | Facility: HOSPITAL | Age: 82
End: 2017-11-30

## 2017-11-30 LAB
INR PPP: 1.37 (ref 0.9–1.1)
PROTHROMBIN TIME: 16.4 SECONDS (ref 11.7–14.2)

## 2017-11-30 PROCEDURE — 85610 PROTHROMBIN TIME: CPT | Performed by: ANESTHESIOLOGY

## 2017-11-30 RX ORDER — MIDAZOLAM HYDROCHLORIDE 1 MG/ML
1 INJECTION INTRAMUSCULAR; INTRAVENOUS AS NEEDED
Status: DISCONTINUED | OUTPATIENT
Start: 2017-11-30 | End: 2017-12-01 | Stop reason: HOSPADM

## 2017-11-30 RX ORDER — LIDOCAINE HYDROCHLORIDE 10 MG/ML
1 INJECTION, SOLUTION INFILTRATION; PERINEURAL ONCE AS NEEDED
Status: DISCONTINUED | OUTPATIENT
Start: 2017-11-30 | End: 2017-12-01 | Stop reason: HOSPADM

## 2017-11-30 RX ORDER — METHYLPREDNISOLONE ACETATE 80 MG/ML
80 INJECTION, SUSPENSION INTRA-ARTICULAR; INTRALESIONAL; INTRAMUSCULAR; SOFT TISSUE ONCE
Status: DISCONTINUED | OUTPATIENT
Start: 2017-11-30 | End: 2017-12-01 | Stop reason: HOSPADM

## 2017-11-30 RX ORDER — SODIUM CHLORIDE 0.9 % (FLUSH) 0.9 %
1-10 SYRINGE (ML) INJECTION AS NEEDED
Status: DISCONTINUED | OUTPATIENT
Start: 2017-11-30 | End: 2017-12-01 | Stop reason: HOSPADM

## 2017-11-30 RX ORDER — FENTANYL CITRATE 50 UG/ML
50 INJECTION, SOLUTION INTRAMUSCULAR; INTRAVENOUS AS NEEDED
Status: DISCONTINUED | OUTPATIENT
Start: 2017-11-30 | End: 2017-12-01 | Stop reason: HOSPADM

## 2017-11-30 NOTE — ANESTHESIA PROCEDURE NOTES
PAIN Epidural block    Patient location during procedure: pain clinic  Indication:at surgeon's request and procedure for pain  Performed By  Anesthesiologist: CHIN PERKINS  Preanesthetic Checklist  Completed: patient identified, site marked, surgical consent, pre-op evaluation, timeout performed, IV checked, risks and benefits discussed and monitors and equipment checked  Additional Notes  Post-Op Diagnosis Codes:     * Lumbar degenerative disc disease (M51.36)     * Lumbar radiculopathy (M54.16)  INR= 1.37--cannot treat today--will reschedule for tomorrow.  Prep:  Pt Position:prone  Sterile Tech:cap, gloves, mask and sterile barrier  Prep:chlorhexidine gluconate and isopropyl alcohol  Monitoring:blood pressure monitoring, continuous pulse oximetry and EKG  Procedure:  Sedation: no   Approach:left paramedian  Guidance: fluoroscopy  Location:lumbar  Needle Type:Hustead  Needle Gauge:17 G  Aspiration:negative  Test Dose:negative  Medications:  Depomedrol:80 mg    Post Assessment:  Dressing:occlusive dressing applied  Pt Tolerance:patient tolerated the procedure well with no apparent complications  Complications:no

## 2017-11-30 NOTE — TELEPHONE ENCOUNTER
Pt's epidural shots have been postponed form today until Monday 12/4. She wants to know if she can stay off warfarin that long or if she needs to reschedule appt. She has been off for 5 days as of today...Coretta

## 2017-11-30 NOTE — TELEPHONE ENCOUNTER
ABUNDIO she is aware it is ok to hold until mon. She will call if she has any other questions...Coretta

## 2017-12-04 ENCOUNTER — HOSPITAL ENCOUNTER (OUTPATIENT)
Dept: PAIN MEDICINE | Facility: HOSPITAL | Age: 82
Discharge: HOME | End: 2017-12-04
Admitting: NEUROLOGICAL SURGERY

## 2017-12-04 ENCOUNTER — ANESTHESIA EVENT (OUTPATIENT)
Dept: PAIN MEDICINE | Facility: HOSPITAL | Age: 82
End: 2017-12-04

## 2017-12-04 ENCOUNTER — HOSPITAL ENCOUNTER (OUTPATIENT)
Dept: GENERAL RADIOLOGY | Facility: HOSPITAL | Age: 82
Discharge: HOME | End: 2017-12-04

## 2017-12-04 ENCOUNTER — ANESTHESIA (OUTPATIENT)
Dept: PAIN MEDICINE | Facility: HOSPITAL | Age: 82
End: 2017-12-04

## 2017-12-04 VITALS
HEIGHT: 63 IN | RESPIRATION RATE: 16 BRPM | OXYGEN SATURATION: 98 % | WEIGHT: 168 LBS | TEMPERATURE: 97.9 F | HEART RATE: 80 BPM | DIASTOLIC BLOOD PRESSURE: 80 MMHG | BODY MASS INDEX: 29.77 KG/M2 | SYSTOLIC BLOOD PRESSURE: 146 MMHG

## 2017-12-04 DIAGNOSIS — R52 PAIN: ICD-10-CM

## 2017-12-04 PROCEDURE — 25010000002 MIDAZOLAM PER 1 MG: Performed by: ANESTHESIOLOGY

## 2017-12-04 PROCEDURE — C1755 CATHETER, INTRASPINAL: HCPCS

## 2017-12-04 PROCEDURE — 0 IOPAMIDOL 41 % SOLUTION: Performed by: ANESTHESIOLOGY

## 2017-12-04 PROCEDURE — 25010000002 METHYLPREDNISOLONE PER 80 MG: Performed by: ANESTHESIOLOGY

## 2017-12-04 PROCEDURE — 77003 FLUOROGUIDE FOR SPINE INJECT: CPT

## 2017-12-04 RX ORDER — FENTANYL CITRATE 50 UG/ML
50 INJECTION, SOLUTION INTRAMUSCULAR; INTRAVENOUS AS NEEDED
Status: DISCONTINUED | OUTPATIENT
Start: 2017-12-04 | End: 2017-12-05 | Stop reason: HOSPADM

## 2017-12-04 RX ORDER — MIDAZOLAM HYDROCHLORIDE 1 MG/ML
1 INJECTION INTRAMUSCULAR; INTRAVENOUS AS NEEDED
Status: DISCONTINUED | OUTPATIENT
Start: 2017-12-04 | End: 2017-12-05 | Stop reason: HOSPADM

## 2017-12-04 RX ORDER — SODIUM CHLORIDE 0.9 % (FLUSH) 0.9 %
1-10 SYRINGE (ML) INJECTION AS NEEDED
Status: DISCONTINUED | OUTPATIENT
Start: 2017-12-04 | End: 2017-12-05 | Stop reason: HOSPADM

## 2017-12-04 RX ORDER — LIDOCAINE HYDROCHLORIDE 10 MG/ML
1 INJECTION, SOLUTION INFILTRATION; PERINEURAL ONCE AS NEEDED
Status: DISCONTINUED | OUTPATIENT
Start: 2017-12-04 | End: 2017-12-05 | Stop reason: HOSPADM

## 2017-12-04 RX ORDER — METHYLPREDNISOLONE ACETATE 80 MG/ML
80 INJECTION, SUSPENSION INTRA-ARTICULAR; INTRALESIONAL; INTRAMUSCULAR; SOFT TISSUE ONCE
Status: COMPLETED | OUTPATIENT
Start: 2017-12-04 | End: 2017-12-04

## 2017-12-04 RX ADMIN — Medication 0.5 MG: at 09:45

## 2017-12-04 RX ADMIN — METHYLPREDNISOLONE ACETATE 80 MG: 80 INJECTION, SUSPENSION INTRA-ARTICULAR; INTRALESIONAL; INTRAMUSCULAR; SOFT TISSUE at 09:49

## 2017-12-04 RX ADMIN — IOPAMIDOL 10 ML: 408 INJECTION, SOLUTION INTRATHECAL at 09:49

## 2017-12-04 NOTE — H&P
CHIEF COMPLAINT: Back and right lower extremity pain      HISTORY OF PRESENT ILLNESS:  She complains of low lumbar back pain which radiates laterally into her right leg to about the mid thigh.  Intermittent in timing.  This occurred in October after she lifted very heavy object.  Between a 6 and 8 out of 10 on the pain scale.  Described as spasm paralyzing sharp.  Aggravated by activity and when the pain comes on it can last for a few days.  She did do physical therapy for 2 weeks but that did not help so she stopped.  It's affecting her ADLs and her activity.  Her MRI shows spinal stenosis    PAST MEDICAL HISTORY:  Current Outpatient Prescriptions on File Prior to Encounter   Medication Sig Dispense Refill   • acetaminophen (TYLENOL) 325 MG tablet Take 325 mg by mouth 2 (two) times a day as needed for mild pain (1-3).     • atorvastatin (LIPITOR) 40 MG tablet Take 0.5 tablets by mouth daily.     • carbidopa-levodopa (SINEMET)  MG per tablet   11   • Cholecalciferol (VITAMIN D3) 5000 UNITS capsule capsule Take 5,000 Units by mouth daily.     • fluticasone (FLONASE) 50 MCG/ACT nasal spray 2 sprays into each nostril daily.     • FLUZONE HIGH-DOSE 0.5 ML suspension prefilled syringe injection TO BE ADMINISTERED BY PHARMACIST FOR IMMUNIZATION  0   • furosemide (LASIX) 40 MG tablet TAKE 1 TABLET EVERY DAY 30 tablet 5   • loratadine (CLARITIN) 10 MG tablet Take 1 tablet by mouth daily.     • losartan (COZAAR) 100 MG tablet Take 1 tablet by mouth daily.     • omeprazole (PriLOSEC) 20 MG capsule Take 1 capsule by mouth daily.     • pyridoxine (B-6) 200 MG tablet Take 1 tablet by mouth daily.     • saccharomyces boulardii (FLORASTOR) 250 MG capsule Take 1 capsule by mouth 2 (Two) Times a Day.     • temazepam (RESTORIL) 7.5 MG capsule TAKE ONE CAPSULE BY MOUTH AT BEDTIME AS NEEDED FOR SLEEP  1   • Umeclidinium-Vilanterol 62.5-25 MCG/INH aerosol powder  Inhale.     • VENTOLIN  (90 BASE) MCG/ACT inhaler  "INHALE 2 PUFFS BY MOUTH EVERY 4 HOURS AS NEEDED  4   • warfarin (COUMADIN) 5 MG tablet TAKE ONE TABLET BY MOUTH DAILY AS DIRECTED 30 tablet 0     No current facility-administered medications on file prior to encounter.        Past Medical History:   Diagnosis Date   • Anemia    • Atrial fibrillation    • Atrial flutter    • AV block, 3rd degree    • Carotid artery stenosis    • Dermatitis    • GERD (gastroesophageal reflux disease)    • History of EKG 06/17/2016   • Hyperlipidemia    • Hypertension    • Low back pain    • Osteoarthritis     knee   • Osteoarthritis    • Pacemaker    • Palpitations    • Raynaud's phenomenon    • SOB (shortness of breath) on exertion    • SSS (sick sinus syndrome)    • URI, acute    • Venous insufficiency          SOCIAL HISTORY:  No tobacco    REVIEW OF SYSTEMS:  No hematologic infectious or constitutional symptoms  Other review of systems non-contributory    PHYSICAL EXAM:  /88 (BP Location: Left arm, Patient Position: Lying)  Pulse 81  Temp 36.6 °C (97.9 °F) (Oral)   Resp 16  Ht 62.5\" (158.8 cm)  Wt 168 lb (76.2 kg)  SpO2 99%  BMI 30.24 kg/m2  Well-developed well-nourished no acute distress  Extra ocular movements intact  Mallampati class 2 airway  Cardiac:  Regular rate and rhythm  Lungs:  Clear to auscultation bilaterally with good effort  Alert and oriented ×3  Deep tendon reflexes normal in the bilateral patella  Negative straight leg raise bilaterally  5 out of 5 strength bilateral upper and lower extremities  Lumbar spine without obvious deformities ecchymoses  Lumbar spine nontender to palpation      DIAGNOSIS:  Post-Op Diagnosis Codes:     * Spinal stenosis, lumbar region, without neurogenic claudication [M48.061]     * Lumbar neuritis [M54.16]    PLAN:  1.  Lumbar epidural steroid injections, up to 3, spaced 1-2 weeks apart.  If pain control is acceptable after 1 or 2 injections, it was discussed with the patient that they may return for the subsequent " injections if and when their pain returns.  The risks were discussed with the patient including failure of relief, worsening pain, Headache (post dural puncture headache), bleeding (epidural hematoma) and infection (epidural abscess or skin infection).  2.  Physical therapy exercises at home as prescribed by physical therapy or from the pain clinic handout (given to the patient).  Continuation of these exercises every day, or multiple times per week, even when the patient has good pain relief, was stressed to the patient as a preventative measure to decrease the frequency and severity of future pain episodes.  3.  Continue pain medicines as already prescribed.  If patient not currently taking any, it is recommended to begin Acetaminophen 1000 mg po q 8 hours.  If other medicines containing Acetaminophen are currently prescribed, maintain daily dose at 3000 mg.    4.  If they can tolerate NSAIDS, it is recommended to take Ibuprofen 600 mg po q 6 hours for 7 days during pain exacerbations.  Alternatively, they may substitute an NSAID of their choice (e.g. Aleve).  This may be taken at the same time as Acetaminophen.  5.  Heat and ice to the affected area as tolerated for pain control.  It was discussed that heating pads can cause burns.  6.  Daily low impact exercise such as walking or water exercise was recommended to maintain overall health and aid in weight control.   7.  Follow up as needed for subsequent injections.  8.  Patient was counseled to abstain from tobacco products.

## 2017-12-04 NOTE — ANESTHESIA PROCEDURE NOTES
PAIN Epidural block    Patient location during procedure: pain clinic  Start Time: 12/4/2017 9:43 AM  Stop Time: 12/4/2017 9:54 AM  Indication:procedure for pain  Performed By  Anesthesiologist: RAMANA DUENAS  Preanesthetic Checklist  Completed: patient identified and risks and benefits discussed  Additional Notes  Diagnosis:     Post-Op Diagnosis Codes:     * Spinal stenosis, lumbar region, without neurogenic claudication (M48.061)     * Lumbar neuritis (M54.16)    Sedation:  Versed 0.5 mg    A lumbar epidural steroid injection with fluoroscopic guidance and an Isovue dye epidurogram was performed.  Under fluoroscopic guidance, the epidural space was identified and accessed, confirmed by loss of resistance to saline followed by injection of Isovue dye, which shows a good epidurogram.  The above medications were injected uneventfully.    Prep:  Pt Position:prone  Sterile Tech:cap, gloves, mask and sterile barrier  Prep:chlorhexidine gluconate and isopropyl alcohol  Monitoring:blood pressure monitoring, continuous pulse oximetry and EKG  Procedure:  Sedation: yes   Approach:midline  Guidance: fluoroscopy  Location:lumbar  Level:4-5  Needle Type:Tuohy  Needle Gauge:20  Aspiration:negative  Medications:  Depomedrol:80  Preservative Free Saline:3mL  Isovue:2mL    Post Assessment:  Pt Tolerance:patient tolerated the procedure well with no apparent complications  Complications:no

## 2017-12-05 RX ORDER — METHYLPREDNISOLONE 4 MG/1
TABLET ORAL
Qty: 1 EACH | Refills: 0 | Status: SHIPPED | OUTPATIENT
Start: 2017-12-05 | End: 2018-01-26

## 2017-12-08 ENCOUNTER — TRANSCRIBE ORDERS (OUTPATIENT)
Dept: PAIN MEDICINE | Facility: HOSPITAL | Age: 82
End: 2017-12-08

## 2017-12-08 DIAGNOSIS — R52 PAIN: Primary | ICD-10-CM

## 2017-12-11 ENCOUNTER — HOSPITAL ENCOUNTER (OUTPATIENT)
Dept: CARDIOLOGY | Facility: HOSPITAL | Age: 82
Setting detail: RECURRING SERIES
Discharge: HOME | End: 2017-12-11

## 2017-12-11 ENCOUNTER — TELEPHONE (OUTPATIENT)
Dept: CARDIOLOGY | Facility: CLINIC | Age: 82
End: 2017-12-11

## 2017-12-11 PROCEDURE — 36416 COLLJ CAPILLARY BLOOD SPEC: CPT

## 2017-12-11 PROCEDURE — 85610 PROTHROMBIN TIME: CPT

## 2017-12-11 NOTE — TELEPHONE ENCOUNTER
Pt is having a colonoscopy 1/10 and epidural shots 1/12. she wants to know if it is ok to DC coumadin on 1/6 and resume after 1/12. Pt has No history of stroke or valve replacement...Coretta

## 2017-12-18 ENCOUNTER — TELEPHONE (OUTPATIENT)
Dept: NEUROSURGERY | Facility: CLINIC | Age: 82
End: 2017-12-18

## 2017-12-18 NOTE — TELEPHONE ENCOUNTER
----- Message from RONEL Garcia sent at 12/18/2017  1:56 PM EST -----  Regarding: appt  On for Wed 12/20 but she has only had 1 LESI. 2nd is for 1/12. I think her appt should wait unless she is worse

## 2017-12-18 NOTE — TELEPHONE ENCOUNTER
LVM explaining that we probably need to reschedule her next appt to AFTER the next LESI on 1-12. If she calls back, please reschedule this (unless she is worse).

## 2017-12-20 ENCOUNTER — HOSPITAL ENCOUNTER (OUTPATIENT)
Dept: CARDIOLOGY | Facility: HOSPITAL | Age: 82
Setting detail: RECURRING SERIES
Discharge: HOME | End: 2017-12-20

## 2017-12-20 PROCEDURE — 36416 COLLJ CAPILLARY BLOOD SPEC: CPT

## 2017-12-20 PROCEDURE — 85610 PROTHROMBIN TIME: CPT

## 2018-01-08 ENCOUNTER — CLINICAL SUPPORT NO REQUIREMENTS (OUTPATIENT)
Dept: CARDIOLOGY | Facility: CLINIC | Age: 83
End: 2018-01-08

## 2018-01-08 DIAGNOSIS — I44.2 COMPLETE HEART BLOCK (HCC): Primary | ICD-10-CM

## 2018-01-08 PROCEDURE — 93279 PRGRMG DEV EVAL PM/LDLS PM: CPT | Performed by: INTERNAL MEDICINE

## 2018-01-12 ENCOUNTER — HOSPITAL ENCOUNTER (OUTPATIENT)
Dept: PAIN MEDICINE | Facility: HOSPITAL | Age: 83
Discharge: HOME | End: 2018-01-12
Admitting: NEUROLOGICAL SURGERY

## 2018-01-12 ENCOUNTER — ANESTHESIA (OUTPATIENT)
Dept: PAIN MEDICINE | Facility: HOSPITAL | Age: 83
End: 2018-01-12

## 2018-01-12 ENCOUNTER — ANESTHESIA EVENT (OUTPATIENT)
Dept: PAIN MEDICINE | Facility: HOSPITAL | Age: 83
End: 2018-01-12

## 2018-01-12 ENCOUNTER — HOSPITAL ENCOUNTER (OUTPATIENT)
Dept: GENERAL RADIOLOGY | Facility: HOSPITAL | Age: 83
Discharge: HOME | End: 2018-01-12

## 2018-01-12 VITALS
HEART RATE: 80 BPM | SYSTOLIC BLOOD PRESSURE: 139 MMHG | TEMPERATURE: 97.8 F | DIASTOLIC BLOOD PRESSURE: 75 MMHG | RESPIRATION RATE: 16 BRPM | OXYGEN SATURATION: 98 %

## 2018-01-12 DIAGNOSIS — R52 PAIN: ICD-10-CM

## 2018-01-12 PROCEDURE — C1755 CATHETER, INTRASPINAL: HCPCS

## 2018-01-12 PROCEDURE — 25010000002 METHYLPREDNISOLONE PER 80 MG: Performed by: ANESTHESIOLOGY

## 2018-01-12 PROCEDURE — 0 IOPAMIDOL 41 % SOLUTION: Performed by: ANESTHESIOLOGY

## 2018-01-12 PROCEDURE — 25010000002 MIDAZOLAM PER 1 MG: Performed by: ANESTHESIOLOGY

## 2018-01-12 PROCEDURE — 77003 FLUOROGUIDE FOR SPINE INJECT: CPT

## 2018-01-12 RX ORDER — SODIUM CHLORIDE 0.9 % (FLUSH) 0.9 %
1-10 SYRINGE (ML) INJECTION AS NEEDED
Status: DISCONTINUED | OUTPATIENT
Start: 2018-01-12 | End: 2018-01-13 | Stop reason: HOSPADM

## 2018-01-12 RX ORDER — MIDAZOLAM HYDROCHLORIDE 1 MG/ML
1 INJECTION INTRAMUSCULAR; INTRAVENOUS
Status: DISCONTINUED | OUTPATIENT
Start: 2018-01-12 | End: 2018-01-13 | Stop reason: HOSPADM

## 2018-01-12 RX ORDER — LIDOCAINE HYDROCHLORIDE 10 MG/ML
1 INJECTION, SOLUTION INFILTRATION; PERINEURAL ONCE
Status: DISCONTINUED | OUTPATIENT
Start: 2018-01-12 | End: 2018-01-13 | Stop reason: HOSPADM

## 2018-01-12 RX ORDER — FENTANYL CITRATE 50 UG/ML
50 INJECTION, SOLUTION INTRAMUSCULAR; INTRAVENOUS
Status: DISCONTINUED | OUTPATIENT
Start: 2018-01-12 | End: 2018-01-13 | Stop reason: HOSPADM

## 2018-01-12 RX ORDER — METHYLPREDNISOLONE ACETATE 80 MG/ML
80 INJECTION, SUSPENSION INTRA-ARTICULAR; INTRALESIONAL; INTRAMUSCULAR; SOFT TISSUE ONCE
Status: COMPLETED | OUTPATIENT
Start: 2018-01-12 | End: 2018-01-12

## 2018-01-12 RX ORDER — SODIUM CHLORIDE 0.9 % (FLUSH) 0.9 %
1-10 SYRINGE (ML) INJECTION AS NEEDED
Status: CANCELLED | OUTPATIENT
Start: 2018-01-12

## 2018-01-12 RX ADMIN — METHYLPREDNISOLONE ACETATE 80 MG: 80 INJECTION, SUSPENSION INTRA-ARTICULAR; INTRALESIONAL; INTRAMUSCULAR; SOFT TISSUE at 07:59

## 2018-01-12 RX ADMIN — MIDAZOLAM 0.5 MG: 1 INJECTION INTRAMUSCULAR; INTRAVENOUS at 07:54

## 2018-01-12 RX ADMIN — IOPAMIDOL 10 ML: 408 INJECTION, SOLUTION INTRATHECAL at 08:01

## 2018-01-12 NOTE — H&P
INTERVAL HISTORY:    The patient returns for another Lumbar epidural steroid injection 2 today.  They have received 60 % improvement since their last injection with a pain level of 7 /10 at its worst recently.    Conservative measures tried in the interim   MP - 2  Lungs - clear  CV - rrr    Diagnosis:Dx:  Post-Op Diagnosis Codes:     * Lumbar spinal stenosis [M48.061]     * Lumbar neuritis [M54.16]    Plan:  Lumbar epidural steroid injection under fluoroscopic guidance    I have encouraged them to continue:  1.  Physical therapy exercises at home as prescribed by physical therapy or from the pain clinic handout (given to the patient).  Continuation of these exercises every day, or multiple times per week, even when the patient has good pain relief, was stressed to the patient as a preventative measure to decrease the frequency and severity of future pain episodes.  2.  Continue pain medicines as already prescribed.  If patient not currently taking any, it is recommended to begin Acetaminophen 1000 mg po q 8 hours.  If other medicines containing Acetaminophen are currently prescribed, maintain daily dose at 3000mg.    3.  If they can tolerate NSAIDS, it is recommended to take Ibuprofen 600 mg po q 6 hours for 7 days during pain exacerbations.   Alternatively, they may substitute an NSAID of their choice (e.g. Aleve)  4.  Heat and ice to the affected area as tolerated for pain control.  It was discussed that heating pads can cause burns.  5.  Low impact exercise such as walking or water exercise was recommended to maintain overall health and aid in weight control.   6.  Follow up as needed for subsequent injections.  7.  Patient was counseled to abstain from tobacco products.

## 2018-01-12 NOTE — ANESTHESIA PROCEDURE NOTES
PAIN Epidural block    Patient location during procedure: pain clinic  Start Time: 1/12/2018 7:52 AM  Stop Time: 1/12/2018 7:59 AM  Indication:at surgeon's request and procedure for pain  Performed By  Anesthesiologist: SHILO CARLISLE  Preanesthetic Checklist  Completed: patient identified, site marked, surgical consent, pre-op evaluation, timeout performed, IV checked, risks and benefits discussed and monitors and equipment checked  Additional Notes  Dx:  Post-Op Diagnosis Codes:     * Lumbar spinal stenosis (M48.061)     * Lumbar neuritis (M54.16)  80 mg depomedrol in epid    Plan : return to clinic as needed  Prep:  Pt Position:prone (prone)  Sterile Tech:cap, gloves, mask and sterile barrier  Prep:chlorhexidine gluconate and isopropyl alcohol  Monitoring:blood pressure monitoring, EKG and continuous pulse oximetry  Procedure:  Sedation: yes   Approach:midline  Guidance: fluoroscopy and c arm pa and lat and loss of resistance  Location:lumbar  Level:4-5 (interlaminar)  Needle Type:[employer]  Needle Gauge:20  Aspiration:negative  Medications:  Depomedrol:80 mg  Preservative Free Saline:3mL  Isovue:2mL    Post Assessment:  Post-procedure: bandaid.  Pt Tolerance:patient tolerated the procedure well with no apparent complications  Complications:no

## 2018-01-26 ENCOUNTER — OFFICE VISIT (OUTPATIENT)
Dept: NEUROSURGERY | Facility: CLINIC | Age: 83
End: 2018-01-26

## 2018-01-26 VITALS
WEIGHT: 166 LBS | HEIGHT: 63 IN | SYSTOLIC BLOOD PRESSURE: 130 MMHG | RESPIRATION RATE: 18 BRPM | DIASTOLIC BLOOD PRESSURE: 78 MMHG | HEART RATE: 80 BPM | BODY MASS INDEX: 29.41 KG/M2

## 2018-01-26 DIAGNOSIS — M47.26 OSTEOARTHRITIS OF SPINE WITH RADICULOPATHY, LUMBAR REGION: ICD-10-CM

## 2018-01-26 DIAGNOSIS — M51.36 DEGENERATIVE DISC DISEASE, LUMBAR: Primary | ICD-10-CM

## 2018-01-26 PROCEDURE — 99213 OFFICE O/P EST LOW 20 MIN: CPT | Performed by: NURSE PRACTITIONER

## 2018-01-26 NOTE — PROGRESS NOTES
"Subjective   Patient ID: Vicky Stewart is a 84 y.o. female is here today for follow-up back pain. Patient presents unaccompanied.     History of Present Illness     She returns to the office today for ongoing follow-up of back pain.  She was last here in November and since her last office visit she has undergone 3 lumbar epidural steroid injections.  Before the injections, physical therapy and steroids did not reduce her pain.    She feels nominally better following the epidural injections.  The pain still persists but is not as severe as before.  She states that she is \"able to cross my legs without pain\" which is something she was unable to do previously.  The worst discomfort continues to be in the right low back radiating down the lateral thigh.  She would like to know what other options there are for ongoing pain improvement.  She would like to go back to physical therapy since she is feeling a little better.  She tries to walk in her home as much as possible.  She denies any new problems.  Currently she is having muscle tightness in the mid, low back from the drive to our office.    She presents unaccompanied.      /78 (BP Location: Left arm, Patient Position: Sitting)  Pulse 80  Resp 18  Ht 158.8 cm (62.5\")  Wt 75.3 kg (166 lb)  BMI 29.88 kg/m2      The following portions of the patient's history were reviewed and updated as appropriate: allergies, current medications, past family history, past medical history, past social history, past surgical history and problem list.    Review of Systems   Musculoskeletal: Positive for arthralgias (right hip pain) and back pain.   Neurological: Negative for weakness and numbness.   Psychiatric/Behavioral: Negative for sleep disturbance.       Objective   Physical Exam   Constitutional: She is oriented to person, place, and time. She appears well-developed and well-nourished. She is cooperative.  Non-toxic appearance. She does not have a sickly appearance. " She does not appear ill.   Very pleasant, well-appearing, older female   HENT:   Head: Normocephalic and atraumatic.   Eyes:   Corrective lenses   Neck: Neck supple.   Pulmonary/Chest: Effort normal.   Musculoskeletal: She exhibits tenderness (Right sided low back tenderness at the sciatic notch) and deformity (Thoracic kyphosis and levoscoliosis).        Lumbar back: She exhibits decreased range of motion, tenderness and pain. She exhibits no swelling, no edema, no deformity and no laceration.   Moving all extremities well   Neurological: She is alert and oriented to person, place, and time. She displays normal reflexes. No cranial nerve deficit or sensory deficit. Coordination and gait normal. GCS eye subscore is 4. GCS verbal subscore is 5. GCS motor subscore is 6.   Gait is slow, purposeful, using a straight cane for stability   Skin: Skin is warm.   Psychiatric: She has a normal mood and affect. Her behavior is normal. Judgment and thought content normal.   Vitals reviewed.    Neurologic Exam     Mental Status   Oriented to person, place, and time.       Assessment/Plan   Independent Review of Radiographic Studies:    No new imaging    Pain management notes reviewed    Procedure notes reviewed    Consulting provider notes reviewed    Medical Decision Making:    She returns to the office today for ongoing follow-up of low back and right buttock and leg pain.  Exam as noted above, no red flags.  She has minimal improvement following the lumbar epidural steroid injections.  She is now wanting to do physical therapy in hopes that with additional pain relief therapy will now be more beneficial.  I also suggested that she should discuss with her primary care provider the use of topical diclofenac.  This may help during exacerbations of the low back and right buttock and hip pain.  Going forward, we will suggest a CT myelogram to look for underlying nerve root issues or impingement that may be contributing to her  pain.  This time, she wants to refrain from doing additional imaging studies.  She would like to try physical therapy and possibly the use of additional medications.  Overall she is feeling better, but is not where she wants to be.  I reiterated that she needs to continue to remain as active as possible and to resume his daily walking.  I've ordered physical therapy and we will see her back in the office on an as-needed basis.  If she would like the CT myelogram we will order this study for and arrange for follow-up afterwards.    Plan: Referral to physical therapy, follow-up as needed  Vicky was seen today for back pain.    Diagnoses and all orders for this visit:    Degenerative disc disease, lumbar  -     Ambulatory Referral to Physical Therapy Evaluate and treat    Osteoarthritis of spine with radiculopathy, lumbar region  -     Ambulatory Referral to Physical Therapy Evaluate and treat      Return if symptoms worsen or fail to improve.

## 2018-01-31 ENCOUNTER — CLINICAL SUPPORT NO REQUIREMENTS (OUTPATIENT)
Dept: CARDIOLOGY | Facility: CLINIC | Age: 83
End: 2018-01-31

## 2018-01-31 DIAGNOSIS — I44.2 THIRD DEGREE AV BLOCK (HCC): Primary | ICD-10-CM

## 2018-01-31 PROCEDURE — 93294 REM INTERROG EVL PM/LDLS PM: CPT | Performed by: INTERNAL MEDICINE

## 2018-01-31 PROCEDURE — 93296 REM INTERROG EVL PM/IDS: CPT | Performed by: INTERNAL MEDICINE

## 2018-02-14 ENCOUNTER — APPOINTMENT (OUTPATIENT)
Dept: WOMENS IMAGING | Facility: HOSPITAL | Age: 83
End: 2018-02-14

## 2018-02-14 PROCEDURE — 77063 BREAST TOMOSYNTHESIS BI: CPT | Performed by: RADIOLOGY

## 2018-02-14 PROCEDURE — 77067 SCR MAMMO BI INCL CAD: CPT | Performed by: RADIOLOGY

## 2018-02-24 ENCOUNTER — TELEPHONE (OUTPATIENT)
Dept: MAMMOGRAPHY | Facility: CLINIC | Age: 83
End: 2018-02-24

## 2018-04-11 DIAGNOSIS — M81.0 AGE-RELATED OSTEOPOROSIS WITHOUT CURRENT PATHOLOGICAL FRACTURE: Primary | ICD-10-CM

## 2018-04-24 ENCOUNTER — RESULTS ENCOUNTER (OUTPATIENT)
Dept: OBSTETRICS AND GYNECOLOGY | Facility: CLINIC | Age: 83
End: 2018-04-24

## 2018-04-24 DIAGNOSIS — M81.0 AGE-RELATED OSTEOPOROSIS WITHOUT CURRENT PATHOLOGICAL FRACTURE: ICD-10-CM

## 2018-05-02 ENCOUNTER — CLINICAL SUPPORT NO REQUIREMENTS (OUTPATIENT)
Dept: CARDIOLOGY | Facility: CLINIC | Age: 83
End: 2018-05-02

## 2018-05-02 DIAGNOSIS — I44.2 COMPLETE HEART BLOCK (HCC): Primary | ICD-10-CM

## 2018-05-02 PROCEDURE — 93294 REM INTERROG EVL PM/LDLS PM: CPT | Performed by: INTERNAL MEDICINE

## 2018-05-02 PROCEDURE — 93296 REM INTERROG EVL PM/IDS: CPT | Performed by: INTERNAL MEDICINE

## 2018-06-22 RX ORDER — FUROSEMIDE 40 MG/1
TABLET ORAL
Qty: 30 TABLET | Refills: 5 | Status: SHIPPED | OUTPATIENT
Start: 2018-06-22 | End: 2018-12-17 | Stop reason: SDUPTHER

## 2018-07-20 ENCOUNTER — TELEPHONE (OUTPATIENT)
Dept: CARDIOLOGY | Facility: CLINIC | Age: 83
End: 2018-07-20

## 2018-07-20 ENCOUNTER — CLINICAL SUPPORT NO REQUIREMENTS (OUTPATIENT)
Dept: CARDIOLOGY | Facility: CLINIC | Age: 83
End: 2018-07-20

## 2018-07-20 DIAGNOSIS — I44.2 COMPLETE HEART BLOCK (HCC): Primary | ICD-10-CM

## 2018-07-20 NOTE — TELEPHONE ENCOUNTER
Spoke to patient. She is c/o episodes of chest pain that woke her from sleep twice last week (week of July 8th-14th). She states that the episodes lasted a while then went away. I had her send a remote to rule out arrhythmia.  Presenting rhythm is  @ 80bpm.  2 NST episodes reported from 7/9 and 7/2, 9 beats and 13 beats. The time of these episodes does not correlate with the patient's symptoms. She also c/o sometimes feeling an ache over the pacer site but denies any swelling or color changes. She had reported this to her PCP and was told to report it to you. Please advise.

## 2018-07-25 ENCOUNTER — TELEPHONE (OUTPATIENT)
Dept: OBSTETRICS AND GYNECOLOGY | Facility: CLINIC | Age: 83
End: 2018-07-25

## 2018-07-25 ENCOUNTER — HOSPITAL ENCOUNTER (OUTPATIENT)
Dept: INFUSION THERAPY | Facility: HOSPITAL | Age: 83
Discharge: HOME | End: 2018-07-25
Attending: OBSTETRICS & GYNECOLOGY | Admitting: OBSTETRICS & GYNECOLOGY

## 2018-07-25 VITALS
HEART RATE: 80 BPM | RESPIRATION RATE: 20 BRPM | DIASTOLIC BLOOD PRESSURE: 84 MMHG | BODY MASS INDEX: 29.7 KG/M2 | OXYGEN SATURATION: 96 % | WEIGHT: 165 LBS | SYSTOLIC BLOOD PRESSURE: 149 MMHG | TEMPERATURE: 97.6 F

## 2018-07-25 DIAGNOSIS — M81.0 AGE-RELATED OSTEOPOROSIS WITHOUT CURRENT PATHOLOGICAL FRACTURE: ICD-10-CM

## 2018-07-25 LAB
25(OH)D3 SERPL-MCNC: 107.2 NG/ML (ref 30–100)
ALBUMIN SERPL-MCNC: 4.1 G/DL (ref 3.5–5.2)
ALBUMIN/GLOB SERPL: 1.8 G/DL
ALP SERPL-CCNC: 59 U/L (ref 39–117)
ALT SERPL W P-5'-P-CCNC: 23 U/L (ref 1–33)
ANION GAP SERPL CALCULATED.3IONS-SCNC: 12.5 MMOL/L
AST SERPL-CCNC: 28 U/L (ref 1–32)
BILIRUB SERPL-MCNC: 0.4 MG/DL (ref 0.1–1.2)
BUN BLD-MCNC: 15 MG/DL (ref 8–23)
BUN/CREAT SERPL: 18.5 (ref 7–25)
CALCIUM SPEC-SCNC: 9.9 MG/DL (ref 8.6–10.5)
CHLORIDE SERPL-SCNC: 104 MMOL/L (ref 98–107)
CO2 SERPL-SCNC: 24.5 MMOL/L (ref 22–29)
CREAT BLD-MCNC: 0.81 MG/DL (ref 0.57–1)
GFR SERPL CREATININE-BSD FRML MDRD: 67 ML/MIN/1.73
GLOBULIN UR ELPH-MCNC: 2.3 GM/DL
GLUCOSE BLD-MCNC: 93 MG/DL (ref 65–99)
POTASSIUM BLD-SCNC: 4.3 MMOL/L (ref 3.5–5.2)
PROT SERPL-MCNC: 6.4 G/DL (ref 6–8.5)
SODIUM BLD-SCNC: 141 MMOL/L (ref 136–145)

## 2018-07-25 PROCEDURE — 25010000002 ZOLEDRONIC ACID 5 MG/100ML SOLUTION: Performed by: OBSTETRICS & GYNECOLOGY

## 2018-07-25 PROCEDURE — 96374 THER/PROPH/DIAG INJ IV PUSH: CPT

## 2018-07-25 PROCEDURE — 80053 COMPREHEN METABOLIC PANEL: CPT | Performed by: OBSTETRICS & GYNECOLOGY

## 2018-07-25 PROCEDURE — 82306 VITAMIN D 25 HYDROXY: CPT | Performed by: OBSTETRICS & GYNECOLOGY

## 2018-07-25 PROCEDURE — 96365 THER/PROPH/DIAG IV INF INIT: CPT

## 2018-07-25 PROCEDURE — 36415 COLL VENOUS BLD VENIPUNCTURE: CPT

## 2018-07-25 RX ORDER — ZOLEDRONIC ACID 5 MG/100ML
5 INJECTION, SOLUTION INTRAVENOUS ONCE
Status: CANCELLED | OUTPATIENT
Start: 2018-07-25

## 2018-07-25 RX ORDER — TRAZODONE HYDROCHLORIDE 50 MG/1
50 TABLET ORAL NIGHTLY
COMMUNITY
End: 2018-10-26

## 2018-07-25 RX ORDER — ZOLEDRONIC ACID 5 MG/100ML
5 INJECTION, SOLUTION INTRAVENOUS ONCE
Status: COMPLETED | OUTPATIENT
Start: 2018-07-25 | End: 2018-07-25

## 2018-07-25 RX ADMIN — ZOLEDRONIC ACID 5 MG: 5 INJECTION, SOLUTION INTRAVENOUS at 11:57

## 2018-07-25 NOTE — TELEPHONE ENCOUNTER
I sw her   No pain in the last week  When it occurred she went back to sleep and felt well.  She had normal nuc in 2016  I told her to call back if it occurs more

## 2018-07-25 NOTE — PROGRESS NOTES
Critical level of vitamin d25 hydroxy called and faxed to Dr. Hughes and he states thru office staff member Jyotsna that he will call patient with instructions.

## 2018-07-25 NOTE — PROGRESS NOTES
Cockcroft-Gault Calc wnl at 60.25 creat clearance for iv Reclast. Infusion given per physician order after lab results noted.  AVS printed and copy to patient.  Discharged home amb after appointment completed.

## 2018-07-25 NOTE — PATIENT INSTRUCTIONS
Zoledronic Acid injection (Paget's Disease, Osteoporosis)  What is this medicine?  ZOLEDRONIC ACID (YAEL perez jennings ik AS id) lowers the amount of calcium loss from bone. It is used to treat Paget's disease and osteoporosis in women.  This medicine may be used for other purposes; ask your health care provider or pharmacist if you have questions.  COMMON BRAND NAME(S): Reclast, Zometa  What should I tell my health care provider before I take this medicine?  They need to know if you have any of these conditions:  -aspirin-sensitive asthma  -cancer, especially if you are receiving medicines used to treat cancer  -dental disease or wear dentures  -infection  -kidney disease  -low levels of calcium in the blood  -past surgery on the parathyroid gland or intestines  -receiving corticosteroids like dexamethasone or prednisone  -an unusual or allergic reaction to zoledronic acid, other medicines, foods, dyes, or preservatives  -pregnant or trying to get pregnant  -breast-feeding  How should I use this medicine?  This medicine is for infusion into a vein. It is given by a health care professional in a hospital or clinic setting.  Talk to your pediatrician regarding the use of this medicine in children. This medicine is not approved for use in children.  Overdosage: If you think you have taken too much of this medicine contact a poison control center or emergency room at once.  NOTE: This medicine is only for you. Do not share this medicine with others.  What if I miss a dose?  It is important not to miss your dose. Call your doctor or health care professional if you are unable to keep an appointment.  What may interact with this medicine?  -certain antibiotics given by injection  -NSAIDs, medicines for pain and inflammation, like ibuprofen or naproxen  -some diuretics like bumetanide, furosemide  -teriparatide  This list may not describe all possible interactions. Give your health care provider a list of all the medicines,  herbs, non-prescription drugs, or dietary supplements you use. Also tell them if you smoke, drink alcohol, or use illegal drugs. Some items may interact with your medicine.  What should I watch for while using this medicine?  Visit your doctor or health care professional for regular checkups. It may be some time before you see the benefit from this medicine. Do not stop taking your medicine unless your doctor tells you to. Your doctor may order blood tests or other tests to see how you are doing.  Women should inform their doctor if they wish to become pregnant or think they might be pregnant. There is a potential for serious side effects to an unborn child. Talk to your health care professional or pharmacist for more information.  You should make sure that you get enough calcium and vitamin D while you are taking this medicine. Discuss the foods you eat and the vitamins you take with your health care professional.  Some people who take this medicine have severe bone, joint, and/or muscle pain. This medicine may also increase your risk for jaw problems or a broken thigh bone. Tell your doctor right away if you have severe pain in your jaw, bones, joints, or muscles. Tell your doctor if you have any pain that does not go away or that gets worse.  Tell your dentist and dental surgeon that you are taking this medicine. You should not have major dental surgery while on this medicine. See your dentist to have a dental exam and fix any dental problems before starting this medicine. Take good care of your teeth while on this medicine. Make sure you see your dentist for regular follow-up appointments.  What side effects may I notice from receiving this medicine?  Side effects that you should report to your doctor or health care professional as soon as possible:  -allergic reactions like skin rash, itching or hives, swelling of the face, lips, or tongue  -anxiety, confusion, or depression  -breathing problems  -changes in  vision  -eye pain  -feeling faint or lightheaded, falls  -jaw pain, especially after dental work  -mouth sores  -muscle cramps, stiffness, or weakness  -redness, blistering, peeling or loosening of the skin, including inside the mouth  -trouble passing urine or change in the amount of urine  Side effects that usually do not require medical attention (report to your doctor or health care professional if they continue or are bothersome):  -bone, joint, or muscle pain  -constipation  -diarrhea  -fever  -hair loss  -irritation at site where injected  -loss of appetite  -nausea, vomiting  -stomach upset  -trouble sleeping  -trouble swallowing  -weak or tired  This list may not describe all possible side effects. Call your doctor for medical advice about side effects. You may report side effects to FDA at 6-237-FDA-5507.  Where should I keep my medicine?  This drug is given in a hospital or clinic and will not be stored at home.  NOTE: This sheet is a summary. It may not cover all possible information. If you have questions about this medicine, talk to your doctor, pharmacist, or health care provider.  © 2018 Elsevier/Gold Standard (2015-05-16 14:19:57)  Osteoporosis  Osteoporosis is the thinning and loss of density in the bones. Osteoporosis makes the bones more brittle, fragile, and likely to break (fracture). Over time, osteoporosis can cause the bones to become so weak that they fracture after a simple fall. The bones most likely to fracture are the bones in the hip, wrist, and spine.  What are the causes?  The exact cause is not known.  What increases the risk?  Anyone can develop osteoporosis. You may be at greater risk if you have a family history of the condition or have poor nutrition. You may also have a higher risk if you are:  · Female.  · 50 years old or older.  · A smoker.  · Not physically active.  · White or .  · Slender.    What are the signs or symptoms?  A fracture might be the first sign of the  disease, especially if it results from a fall or injury that would not usually cause a bone to break. Other signs and symptoms include:  · Low back and neck pain.  · Stooped posture.  · Height loss.    How is this diagnosed?  To make a diagnosis, your health care provider may:  · Take a medical history.  · Perform a physical exam.  · Order tests, such as:  ? A bone mineral density test.  ? A dual-energy X-ray absorptiometry test.    How is this treated?  The goal of osteoporosis treatment is to strengthen your bones to reduce your risk of a fracture. Treatment may involve:  · Making lifestyle changes, such as:  ? Eating a diet rich in calcium.  ? Doing weight-bearing and muscle-strengthening exercises.  ? Stopping tobacco use.  ? Limiting alcohol intake.  · Taking medicine to slow the process of bone loss or to increase bone density.  · Monitoring your levels of calcium and vitamin D.    Follow these instructions at home:  · Include calcium and vitamin D in your diet. Calcium is important for bone health, and vitamin D helps the body absorb calcium.  · Perform weight-bearing and muscle-strengthening exercises as directed by your health care provider.  · Do not use any tobacco products, including cigarettes, chewing tobacco, and electronic cigarettes. If you need help quitting, ask your health care provider.  · Limit your alcohol intake.  · Take medicines only as directed by your health care provider.  · Keep all follow-up visits as directed by your health care provider. This is important.  · Take precautions at home to lower your risk of falling, such as:  ? Keeping rooms well lit and clutter free.  ? Installing safety rails on stairs.  ? Using rubber mats in the bathroom and other areas that are often wet or slippery.  Get help right away if:  You fall or injure yourself.  This information is not intended to replace advice given to you by your health care provider. Make sure you discuss any questions you have with  your health care provider.  Document Released: 09/27/2006 Document Revised: 05/22/2017 Document Reviewed: 05/28/2015  Elsevier Interactive Patient Education © 2018 Elsevier Inc.

## 2018-07-25 NOTE — TELEPHONE ENCOUNTER
L/M informing pt about her vitamin D level advised her to stop taking calcium for a month then have her vit D level redrawn before getting reclast. AVNI

## 2018-08-06 ENCOUNTER — CLINICAL SUPPORT NO REQUIREMENTS (OUTPATIENT)
Dept: CARDIOLOGY | Facility: CLINIC | Age: 83
End: 2018-08-06

## 2018-08-06 DIAGNOSIS — I44.2 THIRD DEGREE AV BLOCK (HCC): Primary | ICD-10-CM

## 2018-08-06 PROCEDURE — 93294 REM INTERROG EVL PM/LDLS PM: CPT | Performed by: INTERNAL MEDICINE

## 2018-08-06 PROCEDURE — 93296 REM INTERROG EVL PM/IDS: CPT | Performed by: INTERNAL MEDICINE

## 2018-09-26 ENCOUNTER — TELEPHONE (OUTPATIENT)
Dept: CARDIOLOGY | Facility: CLINIC | Age: 83
End: 2018-09-26

## 2018-09-26 ENCOUNTER — TRANSCRIBE ORDERS (OUTPATIENT)
Dept: CARDIOLOGY | Facility: CLINIC | Age: 83
End: 2018-09-26

## 2018-09-26 DIAGNOSIS — R06.02 SHORTNESS OF BREATH: Primary | ICD-10-CM

## 2018-09-26 NOTE — TELEPHONE ENCOUNTER
9/26  Spoke with Ruma at [facility]. She stated that Ms Stewart is having increasing shortness of air and they have put in an order for an Echo.  They are wanting her November appointment with Dr Méndez moved up.  Is that possible and if so when?  Thank you,  Yajaira

## 2018-10-04 ENCOUNTER — HOSPITAL ENCOUNTER (OUTPATIENT)
Dept: CARDIOLOGY | Facility: HOSPITAL | Age: 83
Discharge: HOME | End: 2018-10-04
Admitting: NURSE PRACTITIONER

## 2018-10-04 VITALS
WEIGHT: 165 LBS | BODY MASS INDEX: 29.23 KG/M2 | SYSTOLIC BLOOD PRESSURE: 140 MMHG | HEART RATE: 82 BPM | DIASTOLIC BLOOD PRESSURE: 80 MMHG | HEIGHT: 63 IN

## 2018-10-04 DIAGNOSIS — R06.02 SHORTNESS OF BREATH: ICD-10-CM

## 2018-10-04 PROCEDURE — 25010000002 PERFLUTREN (DEFINITY) 8.476 MG IN SODIUM CHLORIDE 0.9 % 10 ML INJECTION: Performed by: NURSE PRACTITIONER

## 2018-10-04 PROCEDURE — 93306 TTE W/DOPPLER COMPLETE: CPT | Performed by: INTERNAL MEDICINE

## 2018-10-04 PROCEDURE — 93306 TTE W/DOPPLER COMPLETE: CPT

## 2018-10-04 RX ADMIN — PERFLUTREN 1.5 ML: 6.52 INJECTION, SUSPENSION INTRAVENOUS at 15:58

## 2018-10-05 LAB
ASCENDING AORTA: 3.7 CM
BH CV ECHO MEAS - ACS: 1.6 CM
BH CV ECHO MEAS - AO MAX PG (FULL): 7 MMHG
BH CV ECHO MEAS - AO MAX PG: 9.9 MMHG
BH CV ECHO MEAS - AO MEAN PG (FULL): 4.2 MMHG
BH CV ECHO MEAS - AO MEAN PG: 6 MMHG
BH CV ECHO MEAS - AO ROOT AREA (BSA CORRECTED): 1.8
BH CV ECHO MEAS - AO ROOT AREA: 8 CM^2
BH CV ECHO MEAS - AO ROOT DIAM: 3.2 CM
BH CV ECHO MEAS - AO V2 MAX: 157.2 CM/SEC
BH CV ECHO MEAS - AO V2 MEAN: 115.1 CM/SEC
BH CV ECHO MEAS - AO V2 VTI: 29.2 CM
BH CV ECHO MEAS - AVA(I,A): 1.8 CM^2
BH CV ECHO MEAS - AVA(I,D): 1.8 CM^2
BH CV ECHO MEAS - AVA(V,A): 1.6 CM^2
BH CV ECHO MEAS - AVA(V,D): 1.6 CM^2
BH CV ECHO MEAS - BSA(HAYCOCK): 1.8 M^2
BH CV ECHO MEAS - BSA: 1.8 M^2
BH CV ECHO MEAS - BZI_BMI: 29.2 KILOGRAMS/M^2
BH CV ECHO MEAS - BZI_METRIC_HEIGHT: 160 CM
BH CV ECHO MEAS - BZI_METRIC_WEIGHT: 74.8 KG
BH CV ECHO MEAS - EDV(MOD-SP2): 67 ML
BH CV ECHO MEAS - EDV(MOD-SP4): 79 ML
BH CV ECHO MEAS - EF(MOD-BP): 67 %
BH CV ECHO MEAS - EF(MOD-SP2): 68.7 %
BH CV ECHO MEAS - EF(MOD-SP4): 64.6 %
BH CV ECHO MEAS - ESV(MOD-SP2): 21 ML
BH CV ECHO MEAS - ESV(MOD-SP4): 28 ML
BH CV ECHO MEAS - IVSD: 0.8 CM
BH CV ECHO MEAS - LAT PEAK E' VEL: 12 CM/SEC
BH CV ECHO MEAS - LV DIASTOLIC VOL/BSA (35-75): 44.3 ML/M^2
BH CV ECHO MEAS - LV MAX PG: 2.8 MMHG
BH CV ECHO MEAS - LV MEAN PG: 1.8 MMHG
BH CV ECHO MEAS - LV SYSTOLIC VOL/BSA (12-30): 15.7 ML/M^2
BH CV ECHO MEAS - LV V1 MAX: 84.4 CM/SEC
BH CV ECHO MEAS - LV V1 MEAN: 64.4 CM/SEC
BH CV ECHO MEAS - LV V1 VTI: 17.5 CM
BH CV ECHO MEAS - LVIDD: 4.4 CM
BH CV ECHO MEAS - LVIDS: 3.6 CM
BH CV ECHO MEAS - LVLD AP2: 6.8 CM
BH CV ECHO MEAS - LVLD AP4: 6.6 CM
BH CV ECHO MEAS - LVLS AP2: 5.1 CM
BH CV ECHO MEAS - LVLS AP4: 5.9 CM
BH CV ECHO MEAS - LVOT AREA (M): 2.8 CM^2
BH CV ECHO MEAS - LVOT AREA: 2.9 CM^2
BH CV ECHO MEAS - LVOT DIAM: 1.9 CM
BH CV ECHO MEAS - LVPWD: 10 CM
BH CV ECHO MEAS - MED PEAK E' VEL: 9 CM/SEC
BH CV ECHO MEAS - MR MAX PG: 25.4 MMHG
BH CV ECHO MEAS - MR MAX VEL: 251.9 CM/SEC
BH CV ECHO MEAS - MV DEC SLOPE: 301.2 CM/SEC^2
BH CV ECHO MEAS - MV DEC TIME: 0.33 SEC
BH CV ECHO MEAS - MV E MAX VEL: 95.1 CM/SEC
BH CV ECHO MEAS - MV MAX PG: 6.3 MMHG
BH CV ECHO MEAS - MV MEAN PG: 3.3 MMHG
BH CV ECHO MEAS - MV P1/2T MAX VEL: 106.6 CM/SEC
BH CV ECHO MEAS - MV P1/2T: 103.7 MSEC
BH CV ECHO MEAS - MV V2 MAX: 126 CM/SEC
BH CV ECHO MEAS - MV V2 MEAN: 85.1 CM/SEC
BH CV ECHO MEAS - MV V2 VTI: 32.3 CM
BH CV ECHO MEAS - MVA P1/2T LCG: 2.1 CM^2
BH CV ECHO MEAS - MVA(P1/2T): 2.1 CM^2
BH CV ECHO MEAS - MVA(VTI): 1.6 CM^2
BH CV ECHO MEAS - PA ACC TIME: 0.1 SEC
BH CV ECHO MEAS - PA MAX PG (FULL): 2.6 MMHG
BH CV ECHO MEAS - PA MAX PG: 3.5 MMHG
BH CV ECHO MEAS - PA PR(ACCEL): 33.1 MMHG
BH CV ECHO MEAS - PA V2 MAX: 93 CM/SEC
BH CV ECHO MEAS - PULM DIAS VEL: 64.3 CM/SEC
BH CV ECHO MEAS - PULM S/D: 0.59
BH CV ECHO MEAS - PULM SYS VEL: 38.1 CM/SEC
BH CV ECHO MEAS - PVA(V,A): 1.6 CM^2
BH CV ECHO MEAS - PVA(V,D): 1.6 CM^2
BH CV ECHO MEAS - QP/QS: 0.6
BH CV ECHO MEAS - RAP SYSTOLE: 3 MMHG
BH CV ECHO MEAS - RV MAX PG: 0.87 MMHG
BH CV ECHO MEAS - RV MEAN PG: 0.54 MMHG
BH CV ECHO MEAS - RV V1 MAX: 46.6 CM/SEC
BH CV ECHO MEAS - RV V1 MEAN: 34.4 CM/SEC
BH CV ECHO MEAS - RV V1 VTI: 9.9 CM
BH CV ECHO MEAS - RVOT AREA: 3.1 CM^2
BH CV ECHO MEAS - RVOT DIAM: 2 CM
BH CV ECHO MEAS - RVSP: 26 MMHG
BH CV ECHO MEAS - SI(AO): 130.6 ML/M^2
BH CV ECHO MEAS - SI(LVOT): 28.7 ML/M^2
BH CV ECHO MEAS - SI(MOD-SP2): 25.8 ML/M^2
BH CV ECHO MEAS - SI(MOD-SP4): 28.6 ML/M^2
BH CV ECHO MEAS - SUP REN AO DIAM: 1.9 CM
BH CV ECHO MEAS - SV(AO): 232.8 ML
BH CV ECHO MEAS - SV(LVOT): 51.2 ML
BH CV ECHO MEAS - SV(MOD-SP2): 46 ML
BH CV ECHO MEAS - SV(MOD-SP4): 51 ML
BH CV ECHO MEAS - SV(RVOT): 30.9 ML
BH CV ECHO MEAS - TAPSE (>1.6): 1.8 CM2
BH CV ECHO MEAS - TR MAX VEL: 242.4 CM/SEC
BH CV ECHO MEASUREMENTS AVERAGE E/E' RATIO: 9.06
BH CV XLRA - RV BASE: 2.9 CM
BH CV XLRA - TDI S': 15 CM/SEC
LEFT ATRIUM VOLUME INDEX: 45 ML/M2
MAXIMAL PREDICTED HEART RATE: 136 BPM
SINUS: 3.1 CM
STJ: 2.4 CM
STRESS TARGET HR: 116 BPM

## 2018-10-26 ENCOUNTER — OFFICE VISIT (OUTPATIENT)
Dept: CARDIOLOGY | Facility: CLINIC | Age: 83
End: 2018-10-26

## 2018-10-26 ENCOUNTER — CLINICAL SUPPORT NO REQUIREMENTS (OUTPATIENT)
Dept: CARDIOLOGY | Facility: CLINIC | Age: 83
End: 2018-10-26

## 2018-10-26 VITALS
BODY MASS INDEX: 32.1 KG/M2 | DIASTOLIC BLOOD PRESSURE: 76 MMHG | HEART RATE: 80 BPM | WEIGHT: 170 LBS | SYSTOLIC BLOOD PRESSURE: 120 MMHG | HEIGHT: 61 IN

## 2018-10-26 DIAGNOSIS — I10 ESSENTIAL HYPERTENSION: ICD-10-CM

## 2018-10-26 DIAGNOSIS — I48.21 PERMANENT ATRIAL FIBRILLATION (HCC): Primary | ICD-10-CM

## 2018-10-26 DIAGNOSIS — I44.2 COMPLETE HEART BLOCK (HCC): Primary | ICD-10-CM

## 2018-10-26 DIAGNOSIS — I44.2 THIRD DEGREE AV BLOCK (HCC): ICD-10-CM

## 2018-10-26 DIAGNOSIS — Z95.0 CARDIAC PACEMAKER IN SITU: ICD-10-CM

## 2018-10-26 PROBLEM — E78.5 HYPERLIPIDEMIA: Status: ACTIVE | Noted: 2018-10-26

## 2018-10-26 PROBLEM — I49.5 SICK SINUS SYNDROME: Status: ACTIVE | Noted: 2018-10-26

## 2018-10-26 PROBLEM — J06.9 ACUTE UPPER RESPIRATORY INFECTION: Status: ACTIVE | Noted: 2018-10-26

## 2018-10-26 PROBLEM — D64.9 ANEMIA: Status: ACTIVE | Noted: 2018-10-26

## 2018-10-26 PROBLEM — R79.9 ABNORMAL BLOOD CHEMISTRY LEVEL: Status: ACTIVE | Noted: 2018-10-26

## 2018-10-26 PROBLEM — I87.2 CHRONIC VENOUS INSUFFICIENCY: Status: ACTIVE | Noted: 2018-10-26

## 2018-10-26 PROBLEM — R06.02 SHORTNESS OF BREATH: Status: ACTIVE | Noted: 2018-10-26

## 2018-10-26 PROBLEM — J01.90 ACUTE SINUSITIS: Status: ACTIVE | Noted: 2018-10-26

## 2018-10-26 PROBLEM — G47.00 INSOMNIA: Status: ACTIVE | Noted: 2018-10-26

## 2018-10-26 PROBLEM — I73.00 RAYNAUD'S PHENOMENON: Status: ACTIVE | Noted: 2018-10-26

## 2018-10-26 PROBLEM — R06.09 DYSPNEA ON EXERTION: Status: ACTIVE | Noted: 2018-10-26

## 2018-10-26 PROBLEM — L02.224 FURUNCLE OF GROIN: Status: ACTIVE | Noted: 2018-10-26

## 2018-10-26 PROBLEM — R63.5 ABNORMAL WEIGHT GAIN: Status: ACTIVE | Noted: 2018-10-26

## 2018-10-26 PROBLEM — M17.9 OSTEOARTHRITIS OF KNEE: Status: ACTIVE | Noted: 2018-10-26

## 2018-10-26 PROBLEM — L30.9 ECZEMA: Status: ACTIVE | Noted: 2018-10-26

## 2018-10-26 PROBLEM — E87.5 HYPERKALEMIA: Status: ACTIVE | Noted: 2018-10-26

## 2018-10-26 PROBLEM — I48.92 ATRIAL FLUTTER (HCC): Status: ACTIVE | Noted: 2018-10-26

## 2018-10-26 PROBLEM — K21.9 GASTROESOPHAGEAL REFLUX DISEASE: Status: ACTIVE | Noted: 2018-10-26

## 2018-10-26 PROCEDURE — 93000 ELECTROCARDIOGRAM COMPLETE: CPT | Performed by: INTERNAL MEDICINE

## 2018-10-26 PROCEDURE — 99213 OFFICE O/P EST LOW 20 MIN: CPT | Performed by: INTERNAL MEDICINE

## 2018-10-26 PROCEDURE — 93279 PRGRMG DEV EVAL PM/LDLS PM: CPT | Performed by: INTERNAL MEDICINE

## 2018-10-26 RX ORDER — AMLODIPINE BESYLATE 2.5 MG/1
2.5 TABLET ORAL DAILY
Refills: 1 | COMMUNITY
Start: 2018-10-12 | End: 2020-03-17 | Stop reason: HOSPADM

## 2018-10-26 RX ORDER — FERROUS SULFATE 325(65) MG
325 TABLET ORAL
Status: ON HOLD | COMMUNITY
End: 2020-07-09

## 2018-10-26 NOTE — PROGRESS NOTES
Date of Office Visit: 10/26/2018  Encounter Provider: Kale Méndez MD  Place of Service: [facility] CARDIOLOGY  Patient Name: Vicky Stewart  : 10/18/1933    Subjective:     Encounter Date:10/26/2018      Patient ID: Vicky Stewart is a 85 y.o. female who has a cc of perm af and av node ablation and RV pacing   Just had ech0-- normal ef and nt much pulm htn   Less breathless now that her bp is better controlled more medicine   She feels occ awareness of her heart beat   No anginal chest pain,   No sig ramon,   No soa,   No fainting,  No orthostasis.   No edema.   Exercise tolerance: [place] airport     There have been no hospital admission since the last visit.     There have been no bleeding events.       Past Medical History:   Diagnosis Date   • Anemia    • Atrial fibrillation (CMS/HCC)    • Atrial flutter (CMS/HCC)    • AV block, 3rd degree (CMS/HCC)    • Carotid artery stenosis    • Dermatitis    • GERD (gastroesophageal reflux disease)    • History of EKG 2016   • Hyperlipidemia    • Hypertension    • Low back pain    • Osteoarthritis     knee   • Osteoarthritis    • Osteoporosis    • Pacemaker    • Palpitations    • Raynaud's phenomenon    • SOB (shortness of breath) on exertion    • SSS (sick sinus syndrome) (CMS/HCC)    • URI, acute    • Venous insufficiency        Social History     Social History   • Marital status:      Spouse name: N/A   • Number of children: N/A   • Years of education: N/A     Occupational History   • retired      Social History Main Topics   • Smoking status: Former Smoker     Packs/day: 1.50     Start date:      Quit date:    • Smokeless tobacco: Never Used      Comment: SMOKED 1 1/2 PACKS A DAY FOR 45YEARS  QUIT SMOKING 5 YEARS AGO   • Alcohol use No   • Drug use: No   • Sexual activity: Defer      Comment:      Other Topics Concern   • Not on file     Social History Narrative   • No narrative  "on file       Review of Systems   Constitution: Negative for fever and night sweats.   HENT: Negative for ear pain and stridor.    Eyes: Negative for discharge and visual halos.   Cardiovascular: Negative for cyanosis.   Respiratory: Negative for hemoptysis and sputum production.    Hematologic/Lymphatic: Negative for adenopathy.   Skin: Negative for nail changes and unusual hair distribution.   Musculoskeletal: Negative for gout and joint swelling.   Gastrointestinal: Negative for bowel incontinence and flatus.   Genitourinary: Negative for dysuria and flank pain.   Neurological: Negative for seizures and tremors.   Psychiatric/Behavioral: Negative for altered mental status. The patient is not nervous/anxious.             Objective:     Vitals:    10/26/18 1042   BP: 120/76   BP Location: Right arm   Pulse: 80   Weight: 77.1 kg (170 lb)   Height: 154.9 cm (61\")         Physical Exam   Constitutional: She is oriented to person, place, and time.   HENT:   Head: Normocephalic and atraumatic.   Eyes: Right eye exhibits no discharge. Left eye exhibits no discharge.   Neck: No JVD present. No thyromegaly present.   Cardiovascular: Normal rate and regular rhythm.  Exam reveals no gallop and no friction rub.    No murmur heard.  Pulmonary/Chest: Effort normal and breath sounds normal. She has no rales.   Abdominal: Soft. Bowel sounds are normal. There is no tenderness.   Musculoskeletal: Normal range of motion. She exhibits no edema or deformity.   Neurological: She is alert and oriented to person, place, and time. She exhibits normal muscle tone.   Skin: Skin is warm and dry. No erythema.   Psychiatric: She has a normal mood and affect. Her behavior is normal. Thought content normal.         ECG 12 Lead  Date/Time: 10/26/2018 11:58 AM  Performed by: SHILO KRAUS  Authorized by: SHILO KRAUS   Comparison: compared with previous ECG   Similar to previous ECG  Rhythm: atrial fibrillation and paced  Clinical impression: " abnormal ECG            Lab Review:       Assessment:          Diagnosis Plan   1. Permanent atrial fibrillation (CMS/HCC)     2. Cardiac pacemaker in situ     3. Third degree AV block (CMS/HCC)     4. Essential hypertension            Plan:         Atrial Fibrillation and Atrial Flutter  Assessment  • The patient has permanent atrial fibrillation  • This is non-valvular in etiology  • The patient's CHADS2-VASc score is 4  • A GIU0MF2-PAFw score of 2 or more is considered a high risk for a thromboembolic event    Plan  • Continue in atrial fibrillation with rate control  • Continue warfarin for antithrombotic therapy, bleeding issues discussed  • Continue beta blocker for rate control          She is doing great  Exercise tolerance is slightly improved   Exam -- no extra fluid =, no heart failure  Pacer is great  Echo -- super lv function and it did NOT show pulm HTN   Her BP is great     Good regimen and I encouraged her to stay as active as possible

## 2018-12-17 RX ORDER — FUROSEMIDE 40 MG/1
TABLET ORAL
Qty: 30 TABLET | Refills: 5 | Status: SHIPPED | OUTPATIENT
Start: 2018-12-17 | End: 2019-05-23 | Stop reason: SDUPTHER

## 2019-01-30 ENCOUNTER — CLINICAL SUPPORT NO REQUIREMENTS (OUTPATIENT)
Dept: CARDIOLOGY | Facility: CLINIC | Age: 84
End: 2019-01-30

## 2019-01-30 DIAGNOSIS — I44.2 THIRD DEGREE AV BLOCK (HCC): Primary | ICD-10-CM

## 2019-01-30 PROCEDURE — 93294 REM INTERROG EVL PM/LDLS PM: CPT | Performed by: INTERNAL MEDICINE

## 2019-01-30 PROCEDURE — 93296 REM INTERROG EVL PM/IDS: CPT | Performed by: INTERNAL MEDICINE

## 2019-02-27 ENCOUNTER — APPOINTMENT (OUTPATIENT)
Dept: WOMENS IMAGING | Facility: HOSPITAL | Age: 84
End: 2019-02-27

## 2019-02-27 PROCEDURE — 77063 BREAST TOMOSYNTHESIS BI: CPT | Performed by: RADIOLOGY

## 2019-02-27 PROCEDURE — 77067 SCR MAMMO BI INCL CAD: CPT | Performed by: RADIOLOGY

## 2019-05-24 RX ORDER — FUROSEMIDE 40 MG/1
TABLET ORAL
Qty: 30 TABLET | Refills: 3 | Status: SHIPPED | OUTPATIENT
Start: 2019-05-24 | End: 2019-09-12 | Stop reason: SDUPTHER

## 2019-07-19 ENCOUNTER — CLINICAL SUPPORT NO REQUIREMENTS (OUTPATIENT)
Dept: CARDIOLOGY | Facility: CLINIC | Age: 84
End: 2019-07-19

## 2019-07-19 ENCOUNTER — OFFICE VISIT (OUTPATIENT)
Dept: CARDIOLOGY | Facility: CLINIC | Age: 84
End: 2019-07-19

## 2019-07-19 VITALS
HEART RATE: 80 BPM | HEIGHT: 62 IN | WEIGHT: 170.4 LBS | BODY MASS INDEX: 31.36 KG/M2 | DIASTOLIC BLOOD PRESSURE: 80 MMHG | SYSTOLIC BLOOD PRESSURE: 138 MMHG | OXYGEN SATURATION: 97 %

## 2019-07-19 DIAGNOSIS — Z95.0 CARDIAC PACEMAKER IN SITU: ICD-10-CM

## 2019-07-19 DIAGNOSIS — I48.21 PERMANENT ATRIAL FIBRILLATION (HCC): Primary | ICD-10-CM

## 2019-07-19 DIAGNOSIS — I44.2 COMPLETE HEART BLOCK (HCC): Primary | ICD-10-CM

## 2019-07-19 DIAGNOSIS — I44.2 THIRD DEGREE AV BLOCK (HCC): ICD-10-CM

## 2019-07-19 PROBLEM — I48.92 ATRIAL FLUTTER (HCC): Status: RESOLVED | Noted: 2018-10-26 | Resolved: 2019-07-19

## 2019-07-19 PROCEDURE — 99213 OFFICE O/P EST LOW 20 MIN: CPT | Performed by: INTERNAL MEDICINE

## 2019-07-19 PROCEDURE — 93000 ELECTROCARDIOGRAM COMPLETE: CPT | Performed by: INTERNAL MEDICINE

## 2019-07-19 NOTE — PROGRESS NOTES
Date of Office Visit: 2019  Encounter Provider: Kale Méndez MD  Place of Service: [facility] CARDIOLOGY  Patient Name: Vicky Stewart  : 10/18/1933    Subjective:     Encounter Date:2019      Patient ID: Vicky Stewart is a 85 y.o. female who has a cc of  Perm af and av block and a pacemaker and kyphosis and back pain.     She reports fatigue.   No anginal chest pain,   No sig ramon,   No soa,   No fainting,  No orthostasis.   No edema.   Exercise tolerance: limited by back pain.     There have been no hospital admission since the last visit.     There have been no bleeding events.       Past Medical History:   Diagnosis Date   • Anemia    • Atrial fibrillation (CMS/HCC)    • Atrial flutter (CMS/HCC)    • AV block, 3rd degree (CMS/HCC)    • Carotid artery stenosis    • Dermatitis    • GERD (gastroesophageal reflux disease)    • History of EKG 2016   • Hyperlipidemia    • Hypertension    • Low back pain    • Osteoarthritis     knee   • Osteoarthritis    • Osteoporosis    • Pacemaker    • Palpitations    • Raynaud's phenomenon    • SOB (shortness of breath) on exertion    • SSS (sick sinus syndrome) (CMS/HCC)    • URI, acute    • Venous insufficiency        Social History     Socioeconomic History   • Marital status:      Spouse name: Not on file   • Number of children: Not on file   • Years of education: Not on file   • Highest education level: Not on file   Occupational History   • Occupation: retired   Tobacco Use   • Smoking status: Former Smoker     Packs/day: 1.50     Start date:      Last attempt to quit: 2011     Years since quittin.5   • Smokeless tobacco: Never Used   • Tobacco comment: SMOKED 1 1/2 PACKS A DAY FOR 45YEARS  QUIT SMOKING 5 YEARS AGO   Substance and Sexual Activity   • Alcohol use: No   • Drug use: No   • Sexual activity: Defer     Comment:        Review of Systems   Constitution: Negative for fever and night  "sweats.   HENT: Negative for ear pain and stridor.    Eyes: Negative for discharge and visual halos.   Cardiovascular: Negative for cyanosis.   Respiratory: Negative for hemoptysis and sputum production.    Hematologic/Lymphatic: Negative for adenopathy.   Skin: Negative for nail changes and unusual hair distribution.   Musculoskeletal: Positive for arthritis and back pain. Negative for gout and joint swelling.   Gastrointestinal: Negative for bowel incontinence and flatus.   Genitourinary: Negative for dysuria and flank pain.   Neurological: Negative for seizures and tremors.   Psychiatric/Behavioral: Negative for altered mental status. The patient is not nervous/anxious.             Objective:     Vitals:    07/19/19 0908   BP: 138/80   Pulse: 80   SpO2: 97%   Weight: 77.3 kg (170 lb 6.4 oz)   Height: 157.5 cm (62\")         Physical Exam   Constitutional: She is oriented to person, place, and time.   HENT:   Head: Normocephalic and atraumatic.   Eyes: Right eye exhibits no discharge. Left eye exhibits no discharge.   Neck: No JVD present. No thyromegaly present.   Cardiovascular: Normal rate and regular rhythm. Exam reveals no gallop and no friction rub.   No murmur heard.  Pulmonary/Chest: Effort normal and breath sounds normal. She has no rales.   Abdominal: Soft. Bowel sounds are normal. There is no tenderness.   Musculoskeletal: Normal range of motion. She exhibits no edema or deformity.   Neurological: She is alert and oriented to person, place, and time. She exhibits normal muscle tone.   Skin: Skin is warm and dry. No erythema.   Psychiatric: She has a normal mood and affect. Her behavior is normal. Thought content normal.         ECG 12 Lead  Date/Time: 7/19/2019 9:43 AM  Performed by: Kale Méndez MD  Authorized by: Kale Méndez MD   Comparison: compared with previous ECG   Similar to previous ECG  Rhythm: atrial fibrillation and paced    Clinical impression: abnormal EKG            Lab Review:     "   Assessment:          Diagnosis Plan   1. Permanent atrial fibrillation (CMS/HCC)     2. Cardiac pacemaker in situ     3. Third degree AV block (CMS/HCC)            Plan:     Atrial Fibrillation and Atrial Flutter  Assessment  • The patient has persistent atrial fibrillation  • This is non-valvular in etiology  • The patient's CHADS2-VASc score is 3  • A OAI0RZ4-VAZz score of 2 or more is considered a high risk for a thromboembolic event  • Warfarin prescribed    Plan  • Continue in atrial fibrillation with rate control  • Continue warfarin for antithrombotic therapy, bleeding issues discussed    BP -- Dr gallagher has recently lowered the losartan for low BP.     I reviewed the pacemaker/ICD tracings and the pacing and sensing parameters are normal.  AF burden is 100%

## 2019-09-13 RX ORDER — FUROSEMIDE 40 MG/1
TABLET ORAL
Qty: 90 TABLET | Refills: 1 | Status: SHIPPED | OUTPATIENT
Start: 2019-09-13 | End: 2020-03-18

## 2019-10-22 ENCOUNTER — CLINICAL SUPPORT NO REQUIREMENTS (OUTPATIENT)
Dept: CARDIOLOGY | Facility: CLINIC | Age: 84
End: 2019-10-22

## 2019-10-22 DIAGNOSIS — I48.20 CHRONIC ATRIAL FIBRILLATION (HCC): Primary | ICD-10-CM

## 2019-10-22 PROCEDURE — 93296 REM INTERROG EVL PM/IDS: CPT | Performed by: INTERNAL MEDICINE

## 2019-10-22 PROCEDURE — 93294 REM INTERROG EVL PM/LDLS PM: CPT | Performed by: INTERNAL MEDICINE

## 2019-12-05 ENCOUNTER — TELEPHONE (OUTPATIENT)
Dept: CARDIOLOGY | Facility: CLINIC | Age: 84
End: 2019-12-05

## 2019-12-05 NOTE — TELEPHONE ENCOUNTER
"12/05/19  9:13 AM  Vicky Hernandezchell  10/18/1933  Home Phone [phone]   Mobile [phone]     Vicky Teresa called this morning. She said she has gained 10 pounds since September. She said it's not due to diet because she's been careful with what she eats. She is more fatigued than normal, SOA on exertion and has increasing BLE edema.     She saw Dr. Orr yesterday. He said, \"It's not your lungs. You need to F/U with your cardiologist soon.\" Her B/P this am is 157/99 and HR is 85.    Cardiac-related medication:  Furosemide 40 mg daily  Amlodipine 2.5 mg daily  Warfarin  Atorvastatin  Losartan 50 mg daily (she said one of her doctors decreased her dose from 100 mg to 50 mg because her B/P was low at the time)    She said she tried four extra dose of Lasix over a two week period and she lost 3 pounds, but it didn't help that much. Do you have any recommendations for her?    Thank you!    Leona Guajardo, RN  Triage [facility]    "

## 2019-12-05 NOTE — TELEPHONE ENCOUNTER
Dr. Méndez,    Pt called back again asking for recommendations from you.    Thank you!    Leona Guajardo RN  Triage [facility]

## 2020-01-24 ENCOUNTER — OFFICE VISIT (OUTPATIENT)
Dept: CARDIOLOGY | Facility: CLINIC | Age: 85
End: 2020-01-24

## 2020-01-24 ENCOUNTER — CLINICAL SUPPORT NO REQUIREMENTS (OUTPATIENT)
Dept: CARDIOLOGY | Facility: CLINIC | Age: 85
End: 2020-01-24

## 2020-01-24 VITALS
WEIGHT: 172 LBS | DIASTOLIC BLOOD PRESSURE: 90 MMHG | HEIGHT: 62 IN | BODY MASS INDEX: 31.65 KG/M2 | HEART RATE: 81 BPM | SYSTOLIC BLOOD PRESSURE: 152 MMHG

## 2020-01-24 DIAGNOSIS — I44.2 THIRD DEGREE AV BLOCK (HCC): ICD-10-CM

## 2020-01-24 DIAGNOSIS — Z95.0 CARDIAC PACEMAKER IN SITU: ICD-10-CM

## 2020-01-24 DIAGNOSIS — I48.21 PERMANENT ATRIAL FIBRILLATION (HCC): Primary | ICD-10-CM

## 2020-01-24 DIAGNOSIS — I44.2 COMPLETE HEART BLOCK (HCC): Primary | ICD-10-CM

## 2020-01-24 DIAGNOSIS — I49.5 SICK SINUS SYNDROME (HCC): ICD-10-CM

## 2020-01-24 PROCEDURE — 99214 OFFICE O/P EST MOD 30 MIN: CPT | Performed by: INTERNAL MEDICINE

## 2020-01-24 PROCEDURE — 93000 ELECTROCARDIOGRAM COMPLETE: CPT | Performed by: INTERNAL MEDICINE

## 2020-01-24 PROCEDURE — 93279 PRGRMG DEV EVAL PM/LDLS PM: CPT | Performed by: INTERNAL MEDICINE

## 2020-01-24 RX ORDER — SPIRONOLACTONE 25 MG/1
25 TABLET ORAL DAILY
Qty: 30 TABLET | Refills: 1 | Status: SHIPPED | OUTPATIENT
Start: 2020-01-24 | End: 2020-02-17

## 2020-01-24 NOTE — PROGRESS NOTES
Date of Office Visit: 2020  Encounter Provider: Kale Méndez MD  Place of Service: [facility] CARDIOLOGY  Patient Name: Vicky Stewart  : 10/18/1933    Subjective:     Encounter Date:2020      Patient ID: Vicky Stewart is a 86 y.o. female who has a cc of  Perm AF and heart block and pacer and right heart failure     She reports a little bit more ramon -- hasn't stopped her from cooking and hosting her faimly who are coming to visit her.     On a trip to [facility] she had some edema.     The patient had a good year.   No anginal chest pain,   No sig ramon,   No soa,   No fainting,  No orthostasis.   No edema.   Exercise tolerance: good for her age.     There have been no hospital admission since the last visit.     There have been no bleeding events.       Past Medical History:   Diagnosis Date   • Anemia    • Atrial fibrillation (CMS/HCC)    • Atrial flutter (CMS/HCC)    • AV block, 3rd degree (CMS/HCC)    • Carotid artery stenosis    • Dermatitis    • GERD (gastroesophageal reflux disease)    • History of EKG 2016   • Hyperlipidemia    • Hypertension    • Low back pain    • Osteoarthritis     knee   • Osteoarthritis    • Osteoporosis    • Pacemaker    • Palpitations    • Raynaud's phenomenon    • SOB (shortness of breath) on exertion    • SSS (sick sinus syndrome) (CMS/HCC)    • URI, acute    • Venous insufficiency        Social History     Socioeconomic History   • Marital status:      Spouse name: Not on file   • Number of children: Not on file   • Years of education: Not on file   • Highest education level: Not on file   Occupational History   • Occupation: retired   Tobacco Use   • Smoking status: Former Smoker     Packs/day: 1.50     Start date:      Last attempt to quit: 2011     Years since quittin.0   • Smokeless tobacco: Never Used   • Tobacco comment: SMOKED 1 1/2 PACKS A DAY FOR 45YEARS  QUIT SMOKING 5 YEARS AGO   Substance and Sexual  "Activity   • Alcohol use: No   • Drug use: No   • Sexual activity: Defer     Comment:        Review of Systems   Constitution: Negative for fever and night sweats.   HENT: Negative for ear pain and stridor.    Eyes: Negative for discharge and visual halos.   Cardiovascular: Negative for cyanosis.   Respiratory: Negative for hemoptysis and sputum production.    Hematologic/Lymphatic: Negative for adenopathy.   Skin: Negative for nail changes and unusual hair distribution.   Musculoskeletal: Negative for gout and joint swelling.   Gastrointestinal: Negative for bowel incontinence and flatus.   Genitourinary: Negative for dysuria and flank pain.   Neurological: Negative for seizures and tremors.   Psychiatric/Behavioral: Negative for altered mental status. The patient is not nervous/anxious.             Objective:     Vitals:    01/24/20 1052   BP: 152/90   BP Location: Right arm   Patient Position: Sitting   Cuff Size: Large Adult   Pulse: 81   Weight: 78 kg (172 lb)   Height: 156.2 cm (61.5\")         Physical Exam   Constitutional: She is oriented to person, place, and time.   HENT:   Head: Normocephalic and atraumatic.   Eyes: Right eye exhibits no discharge. Left eye exhibits no discharge.   Neck: No JVD present. No thyromegaly present.   Cardiovascular: Normal rate and regular rhythm. Exam reveals no gallop and no friction rub.   No murmur heard.  Pulmonary/Chest: Effort normal and breath sounds normal. She has no rales.   Abdominal: Soft. Bowel sounds are normal. There is no tenderness.   Musculoskeletal: Normal range of motion. She exhibits no edema or deformity.   Neurological: She is alert and oriented to person, place, and time. She exhibits normal muscle tone.   Skin: Skin is warm and dry. No erythema.   Psychiatric: She has a normal mood and affect. Her behavior is normal. Thought content normal.         ECG 12 Lead  Date/Time: 1/24/2020 11:10 AM  Performed by: Kale Méndez MD  Authorized by: " Kale Méndez MD   Comparison: compared with previous ECG   Similar to previous ECG  Rhythm: atrial fibrillation and paced    Clinical impression: abnormal EKG            Lab Review:       Assessment:          Diagnosis Plan   1. Permanent atrial fibrillation     2. Third degree AV block (CMS/HCC)     3. Cardiac pacemaker in situ     4. Sick sinus syndrome (CMS/HCC)            Plan:       She has htn and occ trouble with venous congestion and undoubtedly has pulmonary HTN     I will try a spironolactone daily to help with dyspnea -- but that is likely multifactorial     I talked with Dr Martinez and she will do a BMP in one week.

## 2020-02-10 ENCOUNTER — TRANSCRIBE ORDERS (OUTPATIENT)
Dept: ADMINISTRATIVE | Facility: HOSPITAL | Age: 85
End: 2020-02-10

## 2020-02-10 DIAGNOSIS — R06.00 DYSPNEA, UNSPECIFIED TYPE: Primary | ICD-10-CM

## 2020-02-13 ENCOUNTER — HOSPITAL ENCOUNTER (OUTPATIENT)
Dept: CARDIOLOGY | Facility: HOSPITAL | Age: 85
Discharge: HOME | End: 2020-02-13
Admitting: INTERNAL MEDICINE

## 2020-02-13 ENCOUNTER — HOSPITAL ENCOUNTER (OUTPATIENT)
Dept: CARDIOLOGY | Facility: HOSPITAL | Age: 85
Discharge: HOME | End: 2020-02-13

## 2020-02-13 DIAGNOSIS — R06.00 DYSPNEA, UNSPECIFIED TYPE: ICD-10-CM

## 2020-02-13 LAB
AORTIC DIMENSIONLESS INDEX: 0.7 (DI)
BH CV ECHO MEAS - AO MAX PG (FULL): 5.5 MMHG
BH CV ECHO MEAS - AO MAX PG: 9.1 MMHG
BH CV ECHO MEAS - AO MEAN PG (FULL): 3 MMHG
BH CV ECHO MEAS - AO MEAN PG: 5 MMHG
BH CV ECHO MEAS - AO ROOT AREA (BSA CORRECTED): 1.8
BH CV ECHO MEAS - AO ROOT AREA: 8 CM^2
BH CV ECHO MEAS - AO ROOT DIAM: 3.2 CM
BH CV ECHO MEAS - AO V2 MAX: 151 CM/SEC
BH CV ECHO MEAS - AO V2 MEAN: 110 CM/SEC
BH CV ECHO MEAS - AO V2 VTI: 29.4 CM
BH CV ECHO MEAS - AVA(I,A): 1.7 CM^2
BH CV ECHO MEAS - AVA(I,D): 1.7 CM^2
BH CV ECHO MEAS - AVA(V,A): 1.8 CM^2
BH CV ECHO MEAS - AVA(V,D): 1.8 CM^2
BH CV ECHO MEAS - BSA(HAYCOCK): 1.8 M^2
BH CV ECHO MEAS - BSA: 1.7 M^2
BH CV ECHO MEAS - BZI_BMI: 31.2 KILOGRAMS/M^2
BH CV ECHO MEAS - BZI_METRIC_HEIGHT: 154.9 CM
BH CV ECHO MEAS - BZI_METRIC_WEIGHT: 74.8 KG
BH CV ECHO MEAS - EDV(CUBED): 85.2 ML
BH CV ECHO MEAS - EDV(MOD-SP2): 71 ML
BH CV ECHO MEAS - EDV(MOD-SP4): 67 ML
BH CV ECHO MEAS - EDV(TEICH): 87.7 ML
BH CV ECHO MEAS - EF(CUBED): 74.2 %
BH CV ECHO MEAS - EF(MOD-BP): 62 %
BH CV ECHO MEAS - EF(MOD-SP2): 62 %
BH CV ECHO MEAS - EF(MOD-SP4): 58.2 %
BH CV ECHO MEAS - EF(TEICH): 66.3 %
BH CV ECHO MEAS - ESV(CUBED): 22 ML
BH CV ECHO MEAS - ESV(MOD-SP2): 27 ML
BH CV ECHO MEAS - ESV(MOD-SP4): 28 ML
BH CV ECHO MEAS - ESV(TEICH): 29.6 ML
BH CV ECHO MEAS - FS: 36.4 %
BH CV ECHO MEAS - IVS/LVPW: 1.1
BH CV ECHO MEAS - IVSD: 1.1 CM
BH CV ECHO MEAS - LAT PEAK E' VEL: 9.7 CM/SEC
BH CV ECHO MEAS - LV DIASTOLIC VOL/BSA (35-75): 38.5 ML/M^2
BH CV ECHO MEAS - LV MASS(C)D: 158.2 GRAMS
BH CV ECHO MEAS - LV MASS(C)DI: 90.9 GRAMS/M^2
BH CV ECHO MEAS - LV MAX PG: 3.7 MMHG
BH CV ECHO MEAS - LV MEAN PG: 2 MMHG
BH CV ECHO MEAS - LV SYSTOLIC VOL/BSA (12-30): 16.1 ML/M^2
BH CV ECHO MEAS - LV V1 MAX: 95.6 CM/SEC
BH CV ECHO MEAS - LV V1 MEAN: 65.3 CM/SEC
BH CV ECHO MEAS - LV V1 VTI: 17.4 CM
BH CV ECHO MEAS - LVIDD: 4.4 CM
BH CV ECHO MEAS - LVIDS: 2.8 CM
BH CV ECHO MEAS - LVLD AP2: 6.1 CM
BH CV ECHO MEAS - LVLD AP4: 6.7 CM
BH CV ECHO MEAS - LVLS AP2: 5.1 CM
BH CV ECHO MEAS - LVLS AP4: 5.2 CM
BH CV ECHO MEAS - LVOT AREA (M): 2.8 CM^2
BH CV ECHO MEAS - LVOT AREA: 2.8 CM^2
BH CV ECHO MEAS - LVOT DIAM: 1.9 CM
BH CV ECHO MEAS - LVPWD: 1 CM
BH CV ECHO MEAS - MED PEAK E' VEL: 7.1 CM/SEC
BH CV ECHO MEAS - MV DEC SLOPE: 541 CM/SEC^2
BH CV ECHO MEAS - MV DEC TIME: 246 SEC
BH CV ECHO MEAS - MV E MAX VEL: 123 CM/SEC
BH CV ECHO MEAS - MV MAX PG: 8.6 MMHG
BH CV ECHO MEAS - MV MEAN PG: 4 MMHG
BH CV ECHO MEAS - MV P1/2T MAX VEL: 143 CM/SEC
BH CV ECHO MEAS - MV P1/2T: 77.4 MSEC
BH CV ECHO MEAS - MV V2 MAX: 147 CM/SEC
BH CV ECHO MEAS - MV V2 MEAN: 88.3 CM/SEC
BH CV ECHO MEAS - MV V2 VTI: 34.4 CM
BH CV ECHO MEAS - MVA P1/2T LCG: 1.5 CM^2
BH CV ECHO MEAS - MVA(P1/2T): 2.8 CM^2
BH CV ECHO MEAS - MVA(VTI): 1.4 CM^2
BH CV ECHO MEAS - RAP SYSTOLE: 3 MMHG
BH CV ECHO MEAS - RVSP: 34 MMHG
BH CV ECHO MEAS - SI(AO): 135.8 ML/M^2
BH CV ECHO MEAS - SI(CUBED): 36.3 ML/M^2
BH CV ECHO MEAS - SI(LVOT): 28.3 ML/M^2
BH CV ECHO MEAS - SI(MOD-SP2): 25.3 ML/M^2
BH CV ECHO MEAS - SI(MOD-SP4): 22.4 ML/M^2
BH CV ECHO MEAS - SI(TEICH): 33.4 ML/M^2
BH CV ECHO MEAS - SV(AO): 236.4 ML
BH CV ECHO MEAS - SV(CUBED): 63.2 ML
BH CV ECHO MEAS - SV(LVOT): 49.3 ML
BH CV ECHO MEAS - SV(MOD-SP2): 44 ML
BH CV ECHO MEAS - SV(MOD-SP4): 39 ML
BH CV ECHO MEAS - SV(TEICH): 58.1 ML
BH CV ECHO MEAS - TAPSE (>1.6): 1.7 CM2
BH CV ECHO MEAS - TR MAX VEL: 282 CM/SEC
BH CV ECHO MEASUREMENTS AVERAGE E/E' RATIO: 14.64
BH CV XLRA - RV BASE: 3.7 CM
BH CV XLRA - TDI S': 11.5 CM/SEC
LEFT ATRIUM VOLUME INDEX: 49 ML/M2

## 2020-02-13 PROCEDURE — 93306 TTE W/DOPPLER COMPLETE: CPT

## 2020-02-13 PROCEDURE — 93306 TTE W/DOPPLER COMPLETE: CPT | Performed by: INTERNAL MEDICINE

## 2020-02-13 PROCEDURE — 25010000002 PERFLUTREN (DEFINITY) 8.476 MG IN SODIUM CHLORIDE 0.9 % 10 ML INJECTION: Performed by: INTERNAL MEDICINE

## 2020-02-13 RX ADMIN — PERFLUTREN 3 ML: 6.52 INJECTION, SUSPENSION INTRAVENOUS at 13:52

## 2020-02-17 RX ORDER — SPIRONOLACTONE 25 MG/1
TABLET ORAL
Qty: 30 TABLET | Refills: 1 | Status: SHIPPED | OUTPATIENT
Start: 2020-02-17 | End: 2020-03-12

## 2020-03-04 ENCOUNTER — APPOINTMENT (OUTPATIENT)
Dept: WOMENS IMAGING | Facility: HOSPITAL | Age: 85
End: 2020-03-04

## 2020-03-04 PROCEDURE — 77067 SCR MAMMO BI INCL CAD: CPT | Performed by: RADIOLOGY

## 2020-03-04 PROCEDURE — 77063 BREAST TOMOSYNTHESIS BI: CPT | Performed by: RADIOLOGY

## 2020-03-12 RX ORDER — SPIRONOLACTONE 25 MG/1
TABLET ORAL
Qty: 30 TABLET | Refills: 1 | Status: SHIPPED | OUTPATIENT
Start: 2020-03-12 | End: 2020-04-08

## 2020-03-14 ENCOUNTER — APPOINTMENT (OUTPATIENT)
Dept: GENERAL RADIOLOGY | Facility: HOSPITAL | Age: 85
End: 2020-03-14

## 2020-03-14 ENCOUNTER — HOSPITAL ENCOUNTER (INPATIENT)
Facility: HOSPITAL | Age: 85
LOS: 3 days | Discharge: HOME HEALTH | End: 2020-03-17
Attending: EMERGENCY MEDICINE | Admitting: INTERNAL MEDICINE

## 2020-03-14 ENCOUNTER — APPOINTMENT (OUTPATIENT)
Dept: CARDIOLOGY | Facility: HOSPITAL | Age: 85
End: 2020-03-14

## 2020-03-14 DIAGNOSIS — I21.19 ACUTE MI, INFERIOR WALL (HCC): Primary | ICD-10-CM

## 2020-03-14 LAB
ABO GROUP BLD: NORMAL
ALBUMIN SERPL-MCNC: 4.1 G/DL (ref 3.5–5.2)
ALBUMIN/GLOB SERPL: 1.8 G/DL
ALP SERPL-CCNC: 63 U/L (ref 39–117)
ALT SERPL W P-5'-P-CCNC: 18 U/L (ref 1–33)
ANION GAP SERPL CALCULATED.3IONS-SCNC: 14.2 MMOL/L (ref 5–15)
AORTIC DIMENSIONLESS INDEX: 0.5 (DI)
AST SERPL-CCNC: 23 U/L (ref 1–32)
BH CV ECHO MEAS - ACS: 1.4 CM
BH CV ECHO MEAS - AO MAX PG (FULL): 5.8 MMHG
BH CV ECHO MEAS - AO MAX PG: 8 MMHG
BH CV ECHO MEAS - AO MEAN PG (FULL): 4 MMHG
BH CV ECHO MEAS - AO MEAN PG: 5 MMHG
BH CV ECHO MEAS - AO ROOT AREA (BSA CORRECTED): 1.8
BH CV ECHO MEAS - AO ROOT AREA: 8.6 CM^2
BH CV ECHO MEAS - AO ROOT DIAM: 3.3 CM
BH CV ECHO MEAS - AO V2 MAX: 141 CM/SEC
BH CV ECHO MEAS - AO V2 MEAN: 101 CM/SEC
BH CV ECHO MEAS - AO V2 VTI: 31.3 CM
BH CV ECHO MEAS - ASC AORTA: 3.3 CM
BH CV ECHO MEAS - AVA(I,A): 1.5 CM^2
BH CV ECHO MEAS - AVA(I,D): 1.5 CM^2
BH CV ECHO MEAS - AVA(V,A): 1.6 CM^2
BH CV ECHO MEAS - AVA(V,D): 1.6 CM^2
BH CV ECHO MEAS - BSA(HAYCOCK): 2 M^2
BH CV ECHO MEAS - BSA: 1.9 M^2
BH CV ECHO MEAS - BZI_BMI: 35.3 KILOGRAMS/M^2
BH CV ECHO MEAS - BZI_METRIC_HEIGHT: 156 CM
BH CV ECHO MEAS - BZI_METRIC_WEIGHT: 86 KG
BH CV ECHO MEAS - EDV(CUBED): 74.1 ML
BH CV ECHO MEAS - EDV(MOD-SP2): 50 ML
BH CV ECHO MEAS - EDV(MOD-SP4): 65 ML
BH CV ECHO MEAS - EDV(TEICH): 78.6 ML
BH CV ECHO MEAS - EF(CUBED): 55.8 %
BH CV ECHO MEAS - EF(MOD-BP): 30 %
BH CV ECHO MEAS - EF(MOD-SP2): 34 %
BH CV ECHO MEAS - EF(MOD-SP4): 29.2 %
BH CV ECHO MEAS - EF(TEICH): 47.9 %
BH CV ECHO MEAS - ESV(CUBED): 32.8 ML
BH CV ECHO MEAS - ESV(MOD-SP2): 33 ML
BH CV ECHO MEAS - ESV(MOD-SP4): 46 ML
BH CV ECHO MEAS - ESV(TEICH): 41 ML
BH CV ECHO MEAS - FS: 23.8 %
BH CV ECHO MEAS - IVS/LVPW: 1.1
BH CV ECHO MEAS - IVSD: 1.1 CM
BH CV ECHO MEAS - LAT PEAK E' VEL: 8.8 CM/SEC
BH CV ECHO MEAS - LV DIASTOLIC VOL/BSA (35-75): 35 ML/M^2
BH CV ECHO MEAS - LV MASS(C)D: 147 GRAMS
BH CV ECHO MEAS - LV MASS(C)DI: 79.2 GRAMS/M^2
BH CV ECHO MEAS - LV MAX PG: 2.1 MMHG
BH CV ECHO MEAS - LV MEAN PG: 1 MMHG
BH CV ECHO MEAS - LV SYSTOLIC VOL/BSA (12-30): 24.8 ML/M^2
BH CV ECHO MEAS - LV V1 MAX: 73.2 CM/SEC
BH CV ECHO MEAS - LV V1 MEAN: 50 CM/SEC
BH CV ECHO MEAS - LV V1 VTI: 14.8 CM
BH CV ECHO MEAS - LVIDD: 4.2 CM
BH CV ECHO MEAS - LVIDS: 3.2 CM
BH CV ECHO MEAS - LVLD AP2: 6.2 CM
BH CV ECHO MEAS - LVLD AP4: 6 CM
BH CV ECHO MEAS - LVLS AP2: 5.4 CM
BH CV ECHO MEAS - LVLS AP4: 5.5 CM
BH CV ECHO MEAS - LVOT AREA (M): 3.1 CM^2
BH CV ECHO MEAS - LVOT AREA: 3.1 CM^2
BH CV ECHO MEAS - LVOT DIAM: 2 CM
BH CV ECHO MEAS - LVPWD: 1 CM
BH CV ECHO MEAS - MED PEAK E' VEL: 5.27 CM/SEC
BH CV ECHO MEAS - MR MAX PG: 90.6 MMHG
BH CV ECHO MEAS - MR MAX VEL: 476 CM/SEC
BH CV ECHO MEAS - MR MEAN PG: 61 MMHG
BH CV ECHO MEAS - MR MEAN VEL: 371 CM/SEC
BH CV ECHO MEAS - MR VTI: 158 CM
BH CV ECHO MEAS - MV DEC SLOPE: 671 CM/SEC^2
BH CV ECHO MEAS - MV DEC TIME: 224 SEC
BH CV ECHO MEAS - MV E MAX VEL: 125 CM/SEC
BH CV ECHO MEAS - MV MAX PG: 6.9 MMHG
BH CV ECHO MEAS - MV MEAN PG: 2 MMHG
BH CV ECHO MEAS - MV P1/2T MAX VEL: 130 CM/SEC
BH CV ECHO MEAS - MV P1/2T: 56.7 MSEC
BH CV ECHO MEAS - MV V2 MAX: 131 CM/SEC
BH CV ECHO MEAS - MV V2 MEAN: 63.9 CM/SEC
BH CV ECHO MEAS - MV V2 VTI: 20.9 CM
BH CV ECHO MEAS - MVA P1/2T LCG: 1.7 CM^2
BH CV ECHO MEAS - MVA(P1/2T): 3.9 CM^2
BH CV ECHO MEAS - MVA(VTI): 2.2 CM^2
BH CV ECHO MEAS - PA ACC TIME: 0.11 SEC
BH CV ECHO MEAS - PA MAX PG (FULL): 1.8 MMHG
BH CV ECHO MEAS - PA MAX PG: 2.7 MMHG
BH CV ECHO MEAS - PA PR(ACCEL): 30 MMHG
BH CV ECHO MEAS - PA V2 MAX: 82.7 CM/SEC
BH CV ECHO MEAS - PVA(V,A): 2.9 CM^2
BH CV ECHO MEAS - PVA(V,D): 2.9 CM^2
BH CV ECHO MEAS - QP/QS: 0.91
BH CV ECHO MEAS - RAP SYSTOLE: 15 MMHG
BH CV ECHO MEAS - RV MAX PG: 0.94 MMHG
BH CV ECHO MEAS - RV MEAN PG: 0 MMHG
BH CV ECHO MEAS - RV V1 MAX: 48.5 CM/SEC
BH CV ECHO MEAS - RV V1 MEAN: 27.1 CM/SEC
BH CV ECHO MEAS - RV V1 VTI: 8.6 CM
BH CV ECHO MEAS - RVOT AREA: 4.9 CM^2
BH CV ECHO MEAS - RVOT DIAM: 2.5 CM
BH CV ECHO MEAS - RVSP: 52.9 MMHG
BH CV ECHO MEAS - SI(AO): 144.3 ML/M^2
BH CV ECHO MEAS - SI(CUBED): 22.3 ML/M^2
BH CV ECHO MEAS - SI(LVOT): 25.1 ML/M^2
BH CV ECHO MEAS - SI(MOD-SP2): 9.2 ML/M^2
BH CV ECHO MEAS - SI(MOD-SP4): 10.2 ML/M^2
BH CV ECHO MEAS - SI(TEICH): 20.3 ML/M^2
BH CV ECHO MEAS - SV(AO): 267.7 ML
BH CV ECHO MEAS - SV(CUBED): 41.3 ML
BH CV ECHO MEAS - SV(LVOT): 46.5 ML
BH CV ECHO MEAS - SV(MOD-SP2): 17 ML
BH CV ECHO MEAS - SV(MOD-SP4): 19 ML
BH CV ECHO MEAS - SV(RVOT): 42.1 ML
BH CV ECHO MEAS - SV(TEICH): 37.6 ML
BH CV ECHO MEAS - TAPSE (>1.6): 2 CM2
BH CV ECHO MEAS - TR MAX PG: 38 MMHG
BH CV ECHO MEAS - TR MAX VEL: 308 CM/SEC
BH CV ECHO MEASUREMENTS AVERAGE E/E' RATIO: 17.77
BH CV XLRA - RV BASE: 3.56 CM
BH CV XLRA - RV LENGTH: 6.05 CM
BH CV XLRA - RV MID: 2.9 CM
BH CV XLRA - TDI S': 8.38 CM/SEC
BILIRUB SERPL-MCNC: 0.4 MG/DL (ref 0.2–1.2)
BLD GP AB SCN SERPL QL: NEGATIVE
BUN BLD-MCNC: 19 MG/DL (ref 8–23)
BUN/CREAT SERPL: 23.8 (ref 7–25)
CALCIUM SPEC-SCNC: 9.4 MG/DL (ref 8.6–10.5)
CHLORIDE SERPL-SCNC: 105 MMOL/L (ref 98–107)
CO2 SERPL-SCNC: 20.8 MMOL/L (ref 22–29)
CREAT BLD-MCNC: 0.8 MG/DL (ref 0.57–1)
DEPRECATED RDW RBC AUTO: 50.1 FL (ref 37–54)
EOSINOPHIL # BLD MANUAL: 0.21 10*3/MM3 (ref 0–0.4)
EOSINOPHIL NFR BLD MANUAL: 3 % (ref 0.3–6.2)
ERYTHROCYTE [DISTWIDTH] IN BLOOD BY AUTOMATED COUNT: 15.7 % (ref 12.3–15.4)
GFR SERPL CREATININE-BSD FRML MDRD: 68 ML/MIN/1.73
GLOBULIN UR ELPH-MCNC: 2.3 GM/DL
GLUCOSE BLD-MCNC: 131 MG/DL (ref 65–99)
GLUCOSE BLDC GLUCOMTR-MCNC: 133 MG/DL (ref 70–130)
HCT VFR BLD AUTO: 35.5 % (ref 34–46.6)
HGB BLD-MCNC: 11.3 G/DL (ref 12–15.9)
HOLD SPECIMEN: NORMAL
HOLD SPECIMEN: NORMAL
INR PPP: 2.47 (ref 0.9–1.1)
LEFT ATRIUM VOLUME INDEX: 43 ML/M2
LV EF 2D ECHO EST: 35 %
LYMPHOCYTES # BLD MANUAL: 4.51 10*3/MM3 (ref 0.7–3.1)
LYMPHOCYTES NFR BLD MANUAL: 63 % (ref 19.6–45.3)
LYMPHOCYTES NFR BLD MANUAL: 7 % (ref 5–12)
MAXIMAL PREDICTED HEART RATE: 134 BPM
MCH RBC QN AUTO: 27.8 PG (ref 26.6–33)
MCHC RBC AUTO-ENTMCNC: 31.8 G/DL (ref 31.5–35.7)
MCV RBC AUTO: 87.2 FL (ref 79–97)
MONOCYTES # BLD AUTO: 0.5 10*3/MM3 (ref 0.1–0.9)
NEUTROPHILS # BLD AUTO: 1.5 10*3/MM3 (ref 1.7–7)
NEUTROPHILS NFR BLD MANUAL: 21 % (ref 42.7–76)
PLAT MORPH BLD: NORMAL
PLATELET # BLD AUTO: 261 10*3/MM3 (ref 140–450)
PMV BLD AUTO: 11.8 FL (ref 6–12)
POTASSIUM BLD-SCNC: 3.7 MMOL/L (ref 3.5–5.2)
PROT SERPL-MCNC: 6.4 G/DL (ref 6–8.5)
PROTHROMBIN TIME: 26.4 SECONDS (ref 11.7–14.2)
RBC # BLD AUTO: 4.07 10*6/MM3 (ref 3.77–5.28)
RBC MORPH BLD: NORMAL
RH BLD: POSITIVE
SODIUM BLD-SCNC: 140 MMOL/L (ref 136–145)
STRESS TARGET HR: 114 BPM
T&S EXPIRATION DATE: NORMAL
TROPONIN T SERPL-MCNC: <0.01 NG/ML (ref 0–0.03)
VARIANT LYMPHS NFR BLD MANUAL: 6 % (ref 0–5)
WBC MORPH BLD: NORMAL
WBC NRBC COR # BLD: 7.16 10*3/MM3 (ref 3.4–10.8)
WHOLE BLOOD HOLD SPECIMEN: NORMAL
WHOLE BLOOD HOLD SPECIMEN: NORMAL

## 2020-03-14 PROCEDURE — 93454 CORONARY ARTERY ANGIO S&I: CPT | Performed by: INTERNAL MEDICINE

## 2020-03-14 PROCEDURE — 99285 EMERGENCY DEPT VISIT HI MDM: CPT

## 2020-03-14 PROCEDURE — 86900 BLOOD TYPING SEROLOGIC ABO: CPT | Performed by: EMERGENCY MEDICINE

## 2020-03-14 PROCEDURE — C1725 CATH, TRANSLUMIN NON-LASER: HCPCS | Performed by: INTERNAL MEDICINE

## 2020-03-14 PROCEDURE — 25010000002 PHENYLEPHRINE PER 1 ML: Performed by: INTERNAL MEDICINE

## 2020-03-14 PROCEDURE — 82962 GLUCOSE BLOOD TEST: CPT

## 2020-03-14 PROCEDURE — 93010 ELECTROCARDIOGRAM REPORT: CPT | Performed by: INTERNAL MEDICINE

## 2020-03-14 PROCEDURE — 0 IOPAMIDOL PER 1 ML: Performed by: INTERNAL MEDICINE

## 2020-03-14 PROCEDURE — C1894 INTRO/SHEATH, NON-LASER: HCPCS | Performed by: INTERNAL MEDICINE

## 2020-03-14 PROCEDURE — 25010000002 ONDANSETRON PER 1 MG: Performed by: INTERNAL MEDICINE

## 2020-03-14 PROCEDURE — 93005 ELECTROCARDIOGRAM TRACING: CPT | Performed by: INTERNAL MEDICINE

## 2020-03-14 PROCEDURE — 85610 PROTHROMBIN TIME: CPT | Performed by: EMERGENCY MEDICINE

## 2020-03-14 PROCEDURE — 86850 RBC ANTIBODY SCREEN: CPT | Performed by: EMERGENCY MEDICINE

## 2020-03-14 PROCEDURE — C1769 GUIDE WIRE: HCPCS | Performed by: INTERNAL MEDICINE

## 2020-03-14 PROCEDURE — 85347 COAGULATION TIME ACTIVATED: CPT

## 2020-03-14 PROCEDURE — 92941 PRQ TRLML REVSC TOT OCCL AMI: CPT | Performed by: INTERNAL MEDICINE

## 2020-03-14 PROCEDURE — 99153 MOD SED SAME PHYS/QHP EA: CPT | Performed by: INTERNAL MEDICINE

## 2020-03-14 PROCEDURE — 25010000002 BH (CUPID ONLY) ADENOSINE 6 MG/100ML MIXTURE: Performed by: INTERNAL MEDICINE

## 2020-03-14 PROCEDURE — 25010000002 FENTANYL CITRATE (PF) 100 MCG/2ML SOLUTION: Performed by: INTERNAL MEDICINE

## 2020-03-14 PROCEDURE — 85025 COMPLETE CBC W/AUTO DIFF WBC: CPT | Performed by: EMERGENCY MEDICINE

## 2020-03-14 PROCEDURE — C1874 STENT, COATED/COV W/DEL SYS: HCPCS | Performed by: INTERNAL MEDICINE

## 2020-03-14 PROCEDURE — 84484 ASSAY OF TROPONIN QUANT: CPT | Performed by: EMERGENCY MEDICINE

## 2020-03-14 PROCEDURE — 25010000002 ONDANSETRON PER 1 MG: Performed by: EMERGENCY MEDICINE

## 2020-03-14 PROCEDURE — B2111ZZ FLUOROSCOPY OF MULTIPLE CORONARY ARTERIES USING LOW OSMOLAR CONTRAST: ICD-10-PCS | Performed by: INTERNAL MEDICINE

## 2020-03-14 PROCEDURE — 71045 X-RAY EXAM CHEST 1 VIEW: CPT

## 2020-03-14 PROCEDURE — 93005 ELECTROCARDIOGRAM TRACING: CPT | Performed by: EMERGENCY MEDICINE

## 2020-03-14 PROCEDURE — 93306 TTE W/DOPPLER COMPLETE: CPT

## 2020-03-14 PROCEDURE — 25010000002 MIDAZOLAM PER 1 MG: Performed by: INTERNAL MEDICINE

## 2020-03-14 PROCEDURE — 99152 MOD SED SAME PHYS/QHP 5/>YRS: CPT | Performed by: INTERNAL MEDICINE

## 2020-03-14 PROCEDURE — 85007 BL SMEAR W/DIFF WBC COUNT: CPT | Performed by: EMERGENCY MEDICINE

## 2020-03-14 PROCEDURE — 86901 BLOOD TYPING SEROLOGIC RH(D): CPT | Performed by: EMERGENCY MEDICINE

## 2020-03-14 PROCEDURE — C1757 CATH, THROMBECTOMY/EMBOLECT: HCPCS | Performed by: INTERNAL MEDICINE

## 2020-03-14 PROCEDURE — 25010000002 HEPARIN (PORCINE) PER 1000 UNITS: Performed by: INTERNAL MEDICINE

## 2020-03-14 PROCEDURE — C1887 CATHETER, GUIDING: HCPCS | Performed by: INTERNAL MEDICINE

## 2020-03-14 PROCEDURE — 25010000002 MORPHINE PER 10 MG: Performed by: EMERGENCY MEDICINE

## 2020-03-14 PROCEDURE — 027035Z DILATION OF CORONARY ARTERY, ONE ARTERY WITH TWO DRUG-ELUTING INTRALUMINAL DEVICES, PERCUTANEOUS APPROACH: ICD-10-PCS | Performed by: INTERNAL MEDICINE

## 2020-03-14 PROCEDURE — 4A023N7 MEASUREMENT OF CARDIAC SAMPLING AND PRESSURE, LEFT HEART, PERCUTANEOUS APPROACH: ICD-10-PCS | Performed by: INTERNAL MEDICINE

## 2020-03-14 PROCEDURE — 80053 COMPREHEN METABOLIC PANEL: CPT | Performed by: EMERGENCY MEDICINE

## 2020-03-14 PROCEDURE — 93306 TTE W/DOPPLER COMPLETE: CPT | Performed by: INTERNAL MEDICINE

## 2020-03-14 PROCEDURE — C9606 PERC D-E COR REVASC W AMI S: HCPCS | Performed by: INTERNAL MEDICINE

## 2020-03-14 PROCEDURE — 25010000002 PERFLUTREN (DEFINITY) 8.476 MG IN SODIUM CHLORIDE 0.9 % 10 ML INJECTION: Performed by: INTERNAL MEDICINE

## 2020-03-14 DEVICE — XIENCE SIERRA™ EVEROLIMUS ELUTING CORONARY STENT SYSTEM 3.50 MM X 28 MM / RAPID-EXCHANGE
Type: IMPLANTABLE DEVICE | Status: FUNCTIONAL
Brand: XIENCE SIERRA™

## 2020-03-14 DEVICE — XIENCE SIERRA™ EVEROLIMUS ELUTING CORONARY STENT SYSTEM 3.50 MM X 15 MM / RAPID-EXCHANGE
Type: IMPLANTABLE DEVICE | Status: FUNCTIONAL
Brand: XIENCE SIERRA™

## 2020-03-14 RX ORDER — SODIUM CHLORIDE 0.9 % (FLUSH) 0.9 %
10 SYRINGE (ML) INJECTION AS NEEDED
Status: DISCONTINUED | OUTPATIENT
Start: 2020-03-14 | End: 2020-03-16

## 2020-03-14 RX ORDER — CLOPIDOGREL BISULFATE 75 MG/1
75 TABLET ORAL DAILY
Status: DISCONTINUED | OUTPATIENT
Start: 2020-03-15 | End: 2020-03-17 | Stop reason: HOSPADM

## 2020-03-14 RX ORDER — HYDROCODONE BITARTRATE AND ACETAMINOPHEN 5; 325 MG/1; MG/1
1 TABLET ORAL EVERY 4 HOURS PRN
Status: DISCONTINUED | OUTPATIENT
Start: 2020-03-14 | End: 2020-03-17 | Stop reason: HOSPADM

## 2020-03-14 RX ORDER — TEMAZEPAM 7.5 MG/1
7.5 CAPSULE ORAL NIGHTLY PRN
Status: DISCONTINUED | OUTPATIENT
Start: 2020-03-14 | End: 2020-03-17 | Stop reason: HOSPADM

## 2020-03-14 RX ORDER — ATORVASTATIN CALCIUM 20 MG/1
20 TABLET, FILM COATED ORAL DAILY
Status: DISCONTINUED | OUTPATIENT
Start: 2020-03-14 | End: 2020-03-17 | Stop reason: HOSPADM

## 2020-03-14 RX ORDER — MORPHINE SULFATE 2 MG/ML
4 INJECTION, SOLUTION INTRAMUSCULAR; INTRAVENOUS ONCE
Status: COMPLETED | OUTPATIENT
Start: 2020-03-14 | End: 2020-03-14

## 2020-03-14 RX ORDER — SODIUM CHLORIDE 9 MG/ML
75 INJECTION, SOLUTION INTRAVENOUS CONTINUOUS
Status: ACTIVE | OUTPATIENT
Start: 2020-03-14 | End: 2020-03-14

## 2020-03-14 RX ORDER — FLUTICASONE PROPIONATE 50 MCG
2 SPRAY, SUSPENSION (ML) NASAL DAILY
Status: DISCONTINUED | OUTPATIENT
Start: 2020-03-14 | End: 2020-03-17 | Stop reason: HOSPADM

## 2020-03-14 RX ORDER — SODIUM CHLORIDE 0.9 % (FLUSH) 0.9 %
10 SYRINGE (ML) INJECTION AS NEEDED
Status: DISCONTINUED | OUTPATIENT
Start: 2020-03-14 | End: 2020-03-17 | Stop reason: HOSPADM

## 2020-03-14 RX ORDER — CLOPIDOGREL BISULFATE 75 MG/1
600 TABLET ORAL ONCE
Status: COMPLETED | OUTPATIENT
Start: 2020-03-14 | End: 2020-03-14

## 2020-03-14 RX ORDER — WARFARIN SODIUM 5 MG/1
5 TABLET ORAL
Status: DISCONTINUED | OUTPATIENT
Start: 2020-03-14 | End: 2020-03-14

## 2020-03-14 RX ORDER — SPIRONOLACTONE 25 MG/1
25 TABLET ORAL DAILY
Status: DISCONTINUED | OUTPATIENT
Start: 2020-03-14 | End: 2020-03-17 | Stop reason: HOSPADM

## 2020-03-14 RX ORDER — ACETAMINOPHEN 325 MG/1
325 TABLET ORAL 2 TIMES DAILY PRN
Status: DISCONTINUED | OUTPATIENT
Start: 2020-03-14 | End: 2020-03-14 | Stop reason: SDUPTHER

## 2020-03-14 RX ORDER — FUROSEMIDE 40 MG/1
40 TABLET ORAL DAILY
Status: DISCONTINUED | OUTPATIENT
Start: 2020-03-14 | End: 2020-03-17 | Stop reason: HOSPADM

## 2020-03-14 RX ORDER — ONDANSETRON 4 MG/1
4 TABLET, FILM COATED ORAL EVERY 6 HOURS PRN
Status: DISCONTINUED | OUTPATIENT
Start: 2020-03-14 | End: 2020-03-17 | Stop reason: HOSPADM

## 2020-03-14 RX ORDER — ACETAMINOPHEN 325 MG/1
650 TABLET ORAL EVERY 4 HOURS PRN
Status: DISCONTINUED | OUTPATIENT
Start: 2020-03-14 | End: 2020-03-17 | Stop reason: HOSPADM

## 2020-03-14 RX ORDER — ONDANSETRON 2 MG/ML
4 INJECTION INTRAMUSCULAR; INTRAVENOUS ONCE
Status: COMPLETED | OUTPATIENT
Start: 2020-03-14 | End: 2020-03-14

## 2020-03-14 RX ORDER — HEPARIN SODIUM 1000 [USP'U]/ML
INJECTION, SOLUTION INTRAVENOUS; SUBCUTANEOUS AS NEEDED
Status: DISCONTINUED | OUTPATIENT
Start: 2020-03-14 | End: 2020-03-14 | Stop reason: HOSPADM

## 2020-03-14 RX ORDER — NOREPINEPHRINE BIT/0.9 % NACL 8 MG/250ML
INFUSION BOTTLE (ML) INTRAVENOUS CONTINUOUS PRN
Status: COMPLETED | OUTPATIENT
Start: 2020-03-14 | End: 2020-03-14

## 2020-03-14 RX ORDER — NITROGLYCERIN 20 MG/100ML
5-200 INJECTION INTRAVENOUS
Status: DISCONTINUED | OUTPATIENT
Start: 2020-03-14 | End: 2020-03-14

## 2020-03-14 RX ORDER — ASPIRIN 325 MG
325 TABLET ORAL ONCE
Status: DISCONTINUED | OUTPATIENT
Start: 2020-03-14 | End: 2020-03-15

## 2020-03-14 RX ORDER — AMLODIPINE BESYLATE 5 MG/1
2.5 TABLET ORAL DAILY
Status: DISCONTINUED | OUTPATIENT
Start: 2020-03-14 | End: 2020-03-15

## 2020-03-14 RX ORDER — LANOLIN ALCOHOL/MO/W.PET/CERES
200 CREAM (GRAM) TOPICAL DAILY
Status: DISCONTINUED | OUTPATIENT
Start: 2020-03-14 | End: 2020-03-17 | Stop reason: HOSPADM

## 2020-03-14 RX ORDER — CLOPIDOGREL BISULFATE 75 MG/1
75 TABLET ORAL ONCE
Status: DISCONTINUED | OUTPATIENT
Start: 2020-03-14 | End: 2020-03-14

## 2020-03-14 RX ORDER — WARFARIN SODIUM 2.5 MG/1
2.5 TABLET ORAL
Status: DISCONTINUED | OUTPATIENT
Start: 2020-03-14 | End: 2020-03-15 | Stop reason: DRUGHIGH

## 2020-03-14 RX ORDER — MIDAZOLAM HYDROCHLORIDE 1 MG/ML
INJECTION INTRAMUSCULAR; INTRAVENOUS AS NEEDED
Status: DISCONTINUED | OUTPATIENT
Start: 2020-03-14 | End: 2020-03-14 | Stop reason: HOSPADM

## 2020-03-14 RX ORDER — ONDANSETRON 2 MG/ML
4 INJECTION INTRAMUSCULAR; INTRAVENOUS EVERY 6 HOURS PRN
Status: DISCONTINUED | OUTPATIENT
Start: 2020-03-14 | End: 2020-03-17 | Stop reason: HOSPADM

## 2020-03-14 RX ORDER — SODIUM CHLORIDE 9 MG/ML
INJECTION, SOLUTION INTRAVENOUS CONTINUOUS PRN
Status: COMPLETED | OUTPATIENT
Start: 2020-03-14 | End: 2020-03-14

## 2020-03-14 RX ORDER — LIDOCAINE HYDROCHLORIDE 20 MG/ML
INJECTION, SOLUTION INFILTRATION; PERINEURAL AS NEEDED
Status: DISCONTINUED | OUTPATIENT
Start: 2020-03-14 | End: 2020-03-14 | Stop reason: HOSPADM

## 2020-03-14 RX ORDER — TEMAZEPAM 15 MG/1
15 CAPSULE ORAL NIGHTLY PRN
Status: DISCONTINUED | OUTPATIENT
Start: 2020-03-14 | End: 2020-03-14

## 2020-03-14 RX ORDER — FENTANYL CITRATE 50 UG/ML
INJECTION, SOLUTION INTRAMUSCULAR; INTRAVENOUS AS NEEDED
Status: DISCONTINUED | OUTPATIENT
Start: 2020-03-14 | End: 2020-03-14 | Stop reason: HOSPADM

## 2020-03-14 RX ADMIN — CLOPIDOGREL BISULFATE 600 MG: 300 TABLET, FILM COATED ORAL at 07:39

## 2020-03-14 RX ADMIN — NITROGLYCERIN 5 MCG/MIN: 20 INJECTION INTRAVENOUS at 07:37

## 2020-03-14 RX ADMIN — ONDANSETRON 4 MG: 2 INJECTION INTRAMUSCULAR; INTRAVENOUS at 10:08

## 2020-03-14 RX ADMIN — TEMAZEPAM 7.5 MG: 7.5 CAPSULE ORAL at 22:27

## 2020-03-14 RX ADMIN — PERFLUTREN 2 ML: 6.52 INJECTION, SUSPENSION INTRAVENOUS at 12:20

## 2020-03-14 RX ADMIN — MORPHINE SULFATE 4 MG: 2 INJECTION, SOLUTION INTRAMUSCULAR; INTRAVENOUS at 07:47

## 2020-03-14 RX ADMIN — CARBIDOPA AND LEVODOPA 1 TABLET: 10; 100 TABLET ORAL at 22:27

## 2020-03-14 RX ADMIN — HYDROCODONE BITARTRATE AND ACETAMINOPHEN 1 TABLET: 5; 325 TABLET ORAL at 10:01

## 2020-03-14 RX ADMIN — ONDANSETRON 4 MG: 2 INJECTION INTRAMUSCULAR; INTRAVENOUS at 07:47

## 2020-03-14 RX ADMIN — ONDANSETRON 4 MG: 2 INJECTION INTRAMUSCULAR; INTRAVENOUS at 18:23

## 2020-03-14 RX ADMIN — SODIUM CHLORIDE: 9 INJECTION, SOLUTION INTRAVENOUS at 07:45

## 2020-03-14 NOTE — PROGRESS NOTES
Pharmacy Consult: Warfarin Dosing/ Monitoring    Vicky Stewart is a 86 y.o. female, estimated creatinine clearance is 50.5 mL/min (by C-G formula based on SCr of 0.8 mg/dL). weighing 85 kg (187 lb 6.3 oz).     has a past medical history of Anemia, Atrial fibrillation (CMS/HCC), Atrial flutter (CMS/HCC), AV block, 3rd degree (CMS/HCC), Carotid artery stenosis, Dermatitis, GERD (gastroesophageal reflux disease), History of EKG (2016), Hyperlipidemia, Hypertension, Low back pain, Osteoarthritis, Osteoarthritis, Osteoporosis, Pacemaker, Palpitations, Raynaud's phenomenon, SOB (shortness of breath) on exertion, SSS (sick sinus syndrome) (CMS/HCC), URI, acute, and Venous insufficiency.    Social History     Tobacco Use   • Smoking status: Former Smoker     Packs/day: 1.50     Start date:      Last attempt to quit:      Years since quittin.2   • Smokeless tobacco: Never Used   • Tobacco comment: SMOKED 1 1/2 PACKS A DAY FOR 45YEARS  QUIT SMOKING 5 YEARS AGO   Substance Use Topics   • Alcohol use: No   • Drug use: No           Results from last 7 days   Lab Units 20  0731   INR  2.47*   HEMOGLOBIN g/dL 11.3*   HEMATOCRIT % 35.5   PLATELETS 10*3/mm3 261     Results from last 7 days   Lab Units 20  0731   SODIUM mmol/L 140   POTASSIUM mmol/L 3.7   CHLORIDE mmol/L 105   CO2 mmol/L 20.8*   BUN mg/dL 19   CREATININE mg/dL 0.80   CALCIUM mg/dL 9.4   BILIRUBIN mg/dL 0.4   ALK PHOS U/L 63   ALT (SGPT) U/L 18   AST (SGOT) U/L 23   GLUCOSE mg/dL 131*     Anticoagulation history:   - Per RN, patient states she takes warfarin 5 mg daily on  and 2.5 mg daily on other days.     Hospital Anticoagulation:  Consulting provider: Melquiades Root MD  Start date:  (inpatient)   Indication: Atrial Fibrillation - requiring full anticoagulation  Target INR: 2-3  Expected duration: indefinite   Bridge Therapy: No    Date             INR 2.47            Warfarin dose               Potential drug  interactions:   - clopidogrel: May enhance the anticoagulant effect of warfarin. (new medication)  - spironolactone: May diminish the anticoagulant effect of warfarin. (home med)  - omeprazole: May enhance the anticoagulant effect of warfarin. (home med, not re-ordered on admission; sticky note entered recommending to stop at DC due to potential interaction with clopidogrel)    Relevant nutrition status: regular diet     Other: S/p cath on AM of 03/14.     Education complete?/ Date:     Assessment/Plan:  1) Continue warfarin 2.5 mg PO daily.   2) INR ordered daily with AM labs.    Pharmacy will continue to follow until discharge or discontinuation of warfarin.     Jules Sams, PharmD, MALIK, BCPS  03/14/20 11:28

## 2020-03-14 NOTE — NURSING NOTE
Pt to this ER via EMS, upon waking  Up this morning was having chest pain,@ 615 Jaw pain began this morning  Denies cp yesterday

## 2020-03-14 NOTE — H&P
Date of Hospital Visit: 20  Encounter Provider: Melquiades Root MD  Place of Service: [facility] CARDIOLOGY  Patient Name: Vicky Stewart  :10/18/1933  [id number]    Chief complaint: Chest pain    History of Present Illness: 86-year-old lady with a history of A. fib currently anticoagulated she lives at home woke up this morning around 3 in the morning with severe chest discomfort shortness of breath finally came to the hospital and is having an extensive inferior and anterolateral infarct.  She does not have any history of bleeding.  She is not allergic to anything she has a history of COPD she says no significant history of kidney disease    Past Medical History:   Diagnosis Date   • Anemia    • Atrial fibrillation (CMS/HCC)    • Atrial flutter (CMS/HCC)    • AV block, 3rd degree (CMS/HCC)    • Carotid artery stenosis    • Dermatitis    • GERD (gastroesophageal reflux disease)    • History of EKG 2016   • Hyperlipidemia    • Hypertension    • Low back pain    • Osteoarthritis     knee   • Osteoarthritis    • Osteoporosis    • Pacemaker    • Palpitations    • Raynaud's phenomenon    • SOB (shortness of breath) on exertion    • SSS (sick sinus syndrome) (CMS/HCC)    • URI, acute    • Venous insufficiency        Past Surgical History:   Procedure Laterality Date   • BILE DUCT STENT PLACEMENT      choledochal   • BREAST BIOPSY Left     benign   • CARDIAC ELECTROPHYSIOLOGY PROCEDURE N/A 2016    Procedure: Lead Revision PPM [place];  Surgeon: Kale Méndez MD;  Location: [facility] INVASIVE LOCATION;  Service:    • CATARACT EXTRACTION W/ INTRAOCULAR LENS  IMPLANT, BILATERAL Bilateral    • CHOLECYSTECTOMY     • COLONOSCOPY  2012   • FACIAL COSMETIC SURGERY     • OTHER SURGICAL HISTORY      interrogation of implantable loop recorder remote, up to 30 days   • PACEMAKER IMPLANTATION     • SINUS SURGERY           (Not in a hospital admission)    Current Meds  No  current facility-administered medications on file prior to encounter.      Current Outpatient Medications on File Prior to Encounter   Medication Sig Dispense Refill   • acetaminophen (TYLENOL) 325 MG tablet Take 325 mg by mouth 2 (two) times a day as needed for mild pain (1-3).     • amLODIPine (NORVASC) 2.5 MG tablet Take 2.5 mg by mouth Daily.  1   • atorvastatin (LIPITOR) 40 MG tablet Take 0.5 tablets by mouth daily.     • carbidopa-levodopa (SINEMET)  MG per tablet   11   • Cholecalciferol (VITAMIN D3) 5000 UNITS capsule capsule Take 5,000 Units by mouth As Needed.     • ferrous sulfate 325 (65 FE) MG tablet Take 325 mg by mouth. Twice  a week     • fluticasone (FLONASE) 50 MCG/ACT nasal spray 2 sprays into each nostril daily.     • FLUZONE HIGH-DOSE 0.5 ML suspension prefilled syringe injection TO BE ADMINISTERED BY PHARMACIST FOR IMMUNIZATION  0   • furosemide (LASIX) 40 MG tablet TAKE 1 TABLET BY MOUTH EVERY DAY 90 tablet 1   • loratadine (CLARITIN) 10 MG tablet Take 1 tablet by mouth daily.     • losartan (COZAAR) 100 MG tablet Take 50 mg by mouth Daily.     • mupirocin (BACTROBAN) 2 % ointment Apply to affected area with each dressing change. 22 g 0   • omeprazole (PriLOSEC) 20 MG capsule Take 1 capsule by mouth daily.     • pyridoxine (B-6) 200 MG tablet Take 1 tablet by mouth daily.     • saccharomyces boulardii (FLORASTOR) 250 MG capsule Take 1 capsule by mouth 2 (Two) Times a Day.     • spironolactone (ALDACTONE) 25 MG tablet TAKE 1 TABLET BY MOUTH EVERY DAY 30 tablet 1   • temazepam (RESTORIL) 7.5 MG capsule 15 mg.  1   • Umeclidinium-Vilanterol 62.5-25 MCG/INH aerosol powder  Inhale 1 puff Daily.     • VENTOLIN  (90 BASE) MCG/ACT inhaler INHALE 2 PUFFS BY MOUTH EVERY 4 HOURS AS NEEDED  4   • warfarin (COUMADIN) 5 MG tablet TAKE ONE TABLET BY MOUTH DAILY AS DIRECTED 30 tablet 0       Social History     Socioeconomic History   • Marital status:      Spouse name: Not on file  "  • Number of children: Not on file   • Years of education: Not on file   • Highest education level: Not on file   Occupational History   • Occupation: retired   Tobacco Use   • Smoking status: Former Smoker     Packs/day: 1.50     Start date:      Last attempt to quit: 2011     Years since quittin.2   • Smokeless tobacco: Never Used   • Tobacco comment: SMOKED 1 1/2 PACKS A DAY FOR 45YEARS  QUIT SMOKING 5 YEARS AGO   Substance and Sexual Activity   • Alcohol use: No   • Drug use: No   • Sexual activity: Defer     Comment:        Family Hx: Non-contributory    REVIEW OF SYSTEMS:   ROS was performed and is negative except as outlined in HPI     REVIEW OF SYSTEMS:   CONSTITUTIONAL: No weight loss, fever, chills, weakness or fatigue.   HEENT: Eyes: No visual loss, blurred vision, double vision or yellow sclerae. Ears, Nose, Throat: No hearing loss, sneezing, congestion, runny nose or sore throat.   SKIN: No rash or itching.     RESPIRATORY: No shortness of breath, hemoptysis, cough or sputum.   GASTROINTESTINAL: No anorexia, nausea, vomiting or diarrhea. No abdominal pain, bright red blood per rectum or melena.  NEUROLOGICAL: No headache, dizziness, syncope, paralysis, numbness or tingling in the extremities.  MUSCULOSKELETAL: No muscle, back pain, joint pain or stiffness.   HEMATOLOGIC: No anemia, bleeding or bruising.   LYMPHATICS: No enlarged nodes.  PSYCHIATRIC: No history of depression, anxiety, hallucinations.   ENDOCRINOLOGIC: No reports of sweating, cold or heat intolerance. No polyuria or polydipsia.        Objective:     Vitals:    20 0740 20 0741   BP: 122/78    BP Location: Right arm    Patient Position: Lying    Pulse: 92    Temp: 97.3 °F (36.3 °C)    TempSrc: Tympanic    SpO2: 95%    Weight:  85 kg (187 lb 6.3 oz)   Height:  156.2 cm (61.5\")     Body mass index is 34.83 kg/m².  Flowsheet Rows      First Filed Value   Admission Height  156.2 cm (61.5\") Documented at 2020 " 0741   Admission Weight  85 kg (187 lb 6.3 oz) Documented at 03/14/2020 0741          General Appearance:    Alert, oriented x 3, in no acute distress,    Head:    Normocephalic, without obvious abnormality, atraumatic   Ears:    Ears appear intact with no abnormalities noted   Throat:   No oral lesions, dentition good   Neck:   No adenopathy, supple, trachea midline, no thyromegaly, no carotid bruit, no JVD   Lungs:    Breath sounds are equal and  clear to auscultation    Heart:   Normal S1 and S2, RRR, no murmur/gallop or rub   Abdomen:    Normal bowel sounds, obese, soft non-tender, non-distended, no organomegaly, no guarding   Extremities:   Moves all extremities well, no edema, no cyanosis, no redness   Pulses:   Pulses palpable and equal bilaterally. Normal radial pulses   Skin:   No bleeding, bruising or rash   Lymph nodes:   No palpable adenopathy     I personally viewed and interpreted the patient's EKG/Telemetry data    Assessment:  Anterior lateral STEMI we did take her directly to the Cath Lab further decisions will be based on the findings of what we find this is obviously life-threatening and high risk for this 86-year-old lady

## 2020-03-15 LAB
ANION GAP SERPL CALCULATED.3IONS-SCNC: 10.4 MMOL/L (ref 5–15)
BUN BLD-MCNC: 15 MG/DL (ref 8–23)
BUN/CREAT SERPL: 17.4 (ref 7–25)
CALCIUM SPEC-SCNC: 8.6 MG/DL (ref 8.6–10.5)
CHLORIDE SERPL-SCNC: 107 MMOL/L (ref 98–107)
CHOLEST SERPL-MCNC: 121 MG/DL (ref 0–200)
CK MB SERPL-CCNC: 75.91 NG/ML
CO2 SERPL-SCNC: 21.6 MMOL/L (ref 22–29)
CREAT BLD-MCNC: 0.86 MG/DL (ref 0.57–1)
DEPRECATED RDW RBC AUTO: 49.2 FL (ref 37–54)
ERYTHROCYTE [DISTWIDTH] IN BLOOD BY AUTOMATED COUNT: 15.8 % (ref 12.3–15.4)
GFR SERPL CREATININE-BSD FRML MDRD: 63 ML/MIN/1.73
GLUCOSE BLD-MCNC: 97 MG/DL (ref 65–99)
HBA1C MFR BLD: 5.6 % (ref 4.8–5.6)
HCT VFR BLD AUTO: 32 % (ref 34–46.6)
HDLC SERPL-MCNC: 63 MG/DL (ref 40–60)
HGB BLD-MCNC: 10.6 G/DL (ref 12–15.9)
INR PPP: 3.09 (ref 0.9–1.1)
LDLC SERPL CALC-MCNC: 45 MG/DL (ref 0–100)
LDLC/HDLC SERPL: 0.71 {RATIO}
MAGNESIUM SERPL-MCNC: 2.1 MG/DL (ref 1.6–2.4)
MCH RBC QN AUTO: 28.2 PG (ref 26.6–33)
MCHC RBC AUTO-ENTMCNC: 33.1 G/DL (ref 31.5–35.7)
MCV RBC AUTO: 85.1 FL (ref 79–97)
PLATELET # BLD AUTO: 214 10*3/MM3 (ref 140–450)
PMV BLD AUTO: 11.4 FL (ref 6–12)
POTASSIUM BLD-SCNC: 5 MMOL/L (ref 3.5–5.2)
PROTHROMBIN TIME: 31.6 SECONDS (ref 11.7–14.2)
RBC # BLD AUTO: 3.76 10*6/MM3 (ref 3.77–5.28)
SODIUM BLD-SCNC: 139 MMOL/L (ref 136–145)
TRIGL SERPL-MCNC: 67 MG/DL (ref 0–150)
TROPONIN T SERPL-MCNC: 1.51 NG/ML (ref 0–0.03)
TROPONIN T SERPL-MCNC: 2.32 NG/ML (ref 0–0.03)
VLDLC SERPL-MCNC: 13.4 MG/DL (ref 5–40)
WBC NRBC COR # BLD: 5.66 10*3/MM3 (ref 3.4–10.8)

## 2020-03-15 PROCEDURE — 80061 LIPID PANEL: CPT | Performed by: INTERNAL MEDICINE

## 2020-03-15 PROCEDURE — 99233 SBSQ HOSP IP/OBS HIGH 50: CPT | Performed by: INTERNAL MEDICINE

## 2020-03-15 PROCEDURE — 80048 BASIC METABOLIC PNL TOTAL CA: CPT | Performed by: INTERNAL MEDICINE

## 2020-03-15 PROCEDURE — 93005 ELECTROCARDIOGRAM TRACING: CPT | Performed by: INTERNAL MEDICINE

## 2020-03-15 PROCEDURE — 84484 ASSAY OF TROPONIN QUANT: CPT | Performed by: INTERNAL MEDICINE

## 2020-03-15 PROCEDURE — 83735 ASSAY OF MAGNESIUM: CPT | Performed by: INTERNAL MEDICINE

## 2020-03-15 PROCEDURE — 83036 HEMOGLOBIN GLYCOSYLATED A1C: CPT | Performed by: INTERNAL MEDICINE

## 2020-03-15 PROCEDURE — 85610 PROTHROMBIN TIME: CPT | Performed by: INTERNAL MEDICINE

## 2020-03-15 PROCEDURE — 85027 COMPLETE CBC AUTOMATED: CPT | Performed by: INTERNAL MEDICINE

## 2020-03-15 PROCEDURE — 82553 CREATINE MB FRACTION: CPT | Performed by: INTERNAL MEDICINE

## 2020-03-15 PROCEDURE — 93010 ELECTROCARDIOGRAM REPORT: CPT | Performed by: INTERNAL MEDICINE

## 2020-03-15 RX ORDER — CARVEDILOL 3.12 MG/1
3.12 TABLET ORAL EVERY 12 HOURS SCHEDULED
Status: DISCONTINUED | OUTPATIENT
Start: 2020-03-15 | End: 2020-03-17 | Stop reason: HOSPADM

## 2020-03-15 RX ADMIN — Medication 200 MG: at 14:00

## 2020-03-15 RX ADMIN — CARVEDILOL 3.12 MG: 3.12 TABLET, FILM COATED ORAL at 20:34

## 2020-03-15 RX ADMIN — ATORVASTATIN CALCIUM 20 MG: 20 TABLET, FILM COATED ORAL at 14:00

## 2020-03-15 RX ADMIN — TEMAZEPAM 7.5 MG: 7.5 CAPSULE ORAL at 01:28

## 2020-03-15 RX ADMIN — ACETAMINOPHEN 650 MG: 325 TABLET, FILM COATED ORAL at 20:21

## 2020-03-15 RX ADMIN — ACETAMINOPHEN 650 MG: 325 TABLET, FILM COATED ORAL at 01:28

## 2020-03-15 RX ADMIN — TEMAZEPAM 7.5 MG: 7.5 CAPSULE ORAL at 20:34

## 2020-03-15 RX ADMIN — CLOPIDOGREL 75 MG: 75 TABLET, FILM COATED ORAL at 14:00

## 2020-03-15 RX ADMIN — CARBIDOPA AND LEVODOPA 1 TABLET: 10; 100 TABLET ORAL at 20:33

## 2020-03-15 RX ADMIN — FUROSEMIDE 40 MG: 40 TABLET ORAL at 14:00

## 2020-03-15 RX ADMIN — CARVEDILOL 3.12 MG: 3.12 TABLET, FILM COATED ORAL at 15:36

## 2020-03-15 RX ADMIN — SPIRONOLACTONE 25 MG: 25 TABLET, FILM COATED ORAL at 14:00

## 2020-03-15 NOTE — PROGRESS NOTES
"Vicky Stewart  10/18/1933 86 y.o.  [id number]      Patient Care Team:  Melquiades Martinez MD as PCP - General (Internal Medicine)    CC: History of a pacemaker, inferior STEMI status post PCI, left main disease, ejection fraction 35%    Interval History: She is doing well today      Objective   Vital Signs  Temp:  [97.7 °F (36.5 °C)-98.2 °F (36.8 °C)] 98 °F (36.7 °C)  Heart Rate:  [80] 80  BP: (103-141)/(56-91) 126/82    Intake/Output Summary (Last 24 hours) at 3/15/2020 1237  Last data filed at 3/15/2020 0603  Gross per 24 hour   Intake 847.6 ml   Output 950 ml   Net -102.4 ml     Flowsheet Rows      First Filed Value   Admission Height  156.2 cm (61.5\") Documented at 03/14/2020 0741   Admission Weight  85 kg (187 lb 6.3 oz) Documented at 03/14/2020 0741          Physical Exam:   General Appearance:    Alert,oriented, in no acute distress   Lungs:     Clear to auscultation,BS are equal    Heart:    Normal S1 and S2, RRR without murmur, gallop or rub   HEENT:    Sclerae are clear, no JVD or adenopathy   Abdomen:     Normal bowel sounds, soft non-tender, non-distended, no HSM   Extremities:   Moves all extremities well, no edema, no cyanosis, no             Redness, no rash     Medication Review:        amLODIPine 2.5 mg Oral Daily   atorvastatin 20 mg Oral Daily   carbidopa-levodopa 1 tablet Oral Q8H   clopidogrel 75 mg Oral Daily   fluticasone 2 spray Nasal Daily   furosemide 40 mg Oral Daily   spironolactone 25 mg Oral Daily   pyridoxine 200 mg Oral Daily       Pharmacy to dose warfarin          I reviewed the patient's new clinical results.  I personally viewed and interpreted the patient's EKG/Telemetry data    Assessment/Plan  Active Hospital Problems    Diagnosis  POA   • Acute MI, inferior wall (CMS/HCC) [I21.19]  Yes      Resolved Hospital Problems   No resolved problems to display.       Status post PCI yesterday doing well today does have pretty reduced LV function I would not adjust her medications " make sure she is on appropriate  Guideline directed medical therapy for her cardiomyopathy I would favor a trial of medical therapy for her left main disease we could maybe approach it percutaneously but it could be very difficult.  A long discussion with her and her family      Melquiades Root MD  03/15/20  12:37

## 2020-03-15 NOTE — NURSING NOTE
Pt daughter is here after flying in. Explained to her our visitation policy has become more restricted . We are usually allowing 2 person per family to visit in 24 hours . Daughter is understanding and agreeable .   non-verbal indicator of pain/discomfort not present

## 2020-03-15 NOTE — PLAN OF CARE
Latest bp 103/56 decrease po intake mostly water this shift.  cc oob to bedside commode twice thus far continues to have intermittent nausea. Sinus rhythm occ paced beats weak and some what unsteady  when oob rt radial site looks good some c/o tenderness at the site . Co neck pain tylenol given once pt had brief intermittent co chest pain after second edisode of going to bsc no change on monitor. O2 in place for comfort.  Await to draw am labs .

## 2020-03-15 NOTE — PROGRESS NOTES
Pharmacy Consult: Warfarin Dosing/ Monitoring    Vicky Stewart is a 86 y.o. female, estimated creatinine clearance is 44.6 mL/min (by C-G formula based on SCr of 0.86 mg/dL). weighing 77.2 kg (170 lb 3.1 oz).     has a past medical history of Anemia, Atrial fibrillation (CMS/HCC), Atrial flutter (CMS/HCC), AV block, 3rd degree (CMS/HCC), Carotid artery stenosis, Dermatitis, GERD (gastroesophageal reflux disease), History of EKG (2016), Hyperlipidemia, Hypertension, Low back pain, Osteoarthritis, Osteoarthritis, Osteoporosis, Pacemaker, Palpitations, Raynaud's phenomenon, SOB (shortness of breath) on exertion, SSS (sick sinus syndrome) (CMS/HCC), URI, acute, and Venous insufficiency.    Social History     Tobacco Use   • Smoking status: Former Smoker     Packs/day: 1.50     Start date:      Last attempt to quit:      Years since quittin.2   • Smokeless tobacco: Never Used   • Tobacco comment: SMOKED 1 1/2 PACKS A DAY FOR 45YEARS  QUIT SMOKING 5 YEARS AGO   Substance Use Topics   • Alcohol use: No   • Drug use: No       Results from last 7 days   Lab Units 03/15/20  0323 20  0731   INR  3.09* 2.47*   HEMOGLOBIN g/dL 10.6* 11.3*   HEMATOCRIT % 32.0* 35.5   PLATELETS 10*3/mm3 214 261     Results from last 7 days   Lab Units 03/15/20  0323 20  0731   SODIUM mmol/L 139 140   POTASSIUM mmol/L 5.0 3.7   CHLORIDE mmol/L 107 105   CO2 mmol/L 21.6* 20.8*   BUN mg/dL 15 19   CREATININE mg/dL 0.86 0.80   CALCIUM mg/dL 8.6 9.4   BILIRUBIN mg/dL  --  0.4   ALK PHOS U/L  --  63   ALT (SGPT) U/L  --  18   AST (SGOT) U/L  --  23   GLUCOSE mg/dL 97 131*     Anticoagulation history:   - Per RN, patient states she takes warfarin 5 mg daily on  and 2.5 mg daily on other days.     Hospital Anticoagulation:  Consulting provider: Melquiades Root MD  Start date:  (inpatient)   Indication: Atrial Fibrillation - requiring full anticoagulation  Target INR: 2-3  Expected duration: indefinite    Bridge Therapy: No  Date 03/14 03/15           INR 2.47 3.09           Warfarin dose none              Potential drug interactions:   - clopidogrel: May enhance the anticoagulant effect of warfarin. (new medication)  - spironolactone: May diminish the anticoagulant effect of warfarin. (home med)  - omeprazole: May enhance the anticoagulant effect of warfarin. (home med, not re-ordered on admission; sticky note entered recommending to stop at DC due to potential interaction with clopidogrel)    Relevant nutrition status: regular diet     Other: S/p cath on AM of 03/14.     Education complete?/ Date:     Assessment/Plan:  Patient did not receive warfarin 2.5 mg dose ordered last night due to severe nausea. INR increased significantly from yesterday. Will hold warfarin dose today and reassess tomorrow based on INR with AM labs.    Pharmacy will continue to follow until discharge or discontinuation of warfarin.     Luis MarksD, MALIK, BCPS  03/15/20 09:21

## 2020-03-15 NOTE — NURSING NOTE
Bedside report completed pt on side of bed denies chest pain or soa wants to walk to bathroom. Daughter at bedside pt returns from bathroom now sitting on the side of bed. Request hs meds at 2030. Call light in reach [facility] calls with critical troponin which is trending down.

## 2020-03-16 LAB
ACT BLD: 312 SECONDS (ref 82–152)
ANION GAP SERPL CALCULATED.3IONS-SCNC: 14.4 MMOL/L (ref 5–15)
BUN BLD-MCNC: 15 MG/DL (ref 8–23)
BUN/CREAT SERPL: 22.1 (ref 7–25)
CALCIUM SPEC-SCNC: 8.4 MG/DL (ref 8.6–10.5)
CHLORIDE SERPL-SCNC: 106 MMOL/L (ref 98–107)
CK MB SERPL-CCNC: 37.9 NG/ML
CO2 SERPL-SCNC: 22.6 MMOL/L (ref 22–29)
CREAT BLD-MCNC: 0.68 MG/DL (ref 0.57–1)
DEPRECATED RDW RBC AUTO: 49 FL (ref 37–54)
ERYTHROCYTE [DISTWIDTH] IN BLOOD BY AUTOMATED COUNT: 15.8 % (ref 12.3–15.4)
GFR SERPL CREATININE-BSD FRML MDRD: 82 ML/MIN/1.73
GLUCOSE BLD-MCNC: 95 MG/DL (ref 65–99)
HCT VFR BLD AUTO: 30 % (ref 34–46.6)
HGB BLD-MCNC: 10 G/DL (ref 12–15.9)
INR PPP: 2.59 (ref 0.9–1.1)
MCH RBC QN AUTO: 28.5 PG (ref 26.6–33)
MCHC RBC AUTO-ENTMCNC: 33.3 G/DL (ref 31.5–35.7)
MCV RBC AUTO: 85.5 FL (ref 79–97)
PLATELET # BLD AUTO: 200 10*3/MM3 (ref 140–450)
PMV BLD AUTO: 11.7 FL (ref 6–12)
POTASSIUM BLD-SCNC: 3.9 MMOL/L (ref 3.5–5.2)
PROTHROMBIN TIME: 27.5 SECONDS (ref 11.7–14.2)
RBC # BLD AUTO: 3.51 10*6/MM3 (ref 3.77–5.28)
SODIUM BLD-SCNC: 143 MMOL/L (ref 136–145)
TROPONIN T SERPL-MCNC: 1.28 NG/ML (ref 0–0.03)
WBC NRBC COR # BLD: 5.53 10*3/MM3 (ref 3.4–10.8)

## 2020-03-16 PROCEDURE — 80048 BASIC METABOLIC PNL TOTAL CA: CPT | Performed by: INTERNAL MEDICINE

## 2020-03-16 PROCEDURE — 85610 PROTHROMBIN TIME: CPT | Performed by: INTERNAL MEDICINE

## 2020-03-16 PROCEDURE — 85027 COMPLETE CBC AUTOMATED: CPT | Performed by: INTERNAL MEDICINE

## 2020-03-16 PROCEDURE — 84484 ASSAY OF TROPONIN QUANT: CPT | Performed by: INTERNAL MEDICINE

## 2020-03-16 PROCEDURE — 82553 CREATINE MB FRACTION: CPT | Performed by: INTERNAL MEDICINE

## 2020-03-16 RX ORDER — ISOSORBIDE MONONITRATE 30 MG/1
30 TABLET, EXTENDED RELEASE ORAL
Status: DISCONTINUED | OUTPATIENT
Start: 2020-03-16 | End: 2020-03-17 | Stop reason: HOSPADM

## 2020-03-16 RX ORDER — WARFARIN SODIUM 2.5 MG/1
2.5 TABLET ORAL
Status: DISCONTINUED | OUTPATIENT
Start: 2020-03-16 | End: 2020-03-17

## 2020-03-16 RX ADMIN — ATORVASTATIN CALCIUM 20 MG: 20 TABLET, FILM COATED ORAL at 08:06

## 2020-03-16 RX ADMIN — TEMAZEPAM 7.5 MG: 7.5 CAPSULE ORAL at 20:37

## 2020-03-16 RX ADMIN — CARVEDILOL 3.12 MG: 3.12 TABLET, FILM COATED ORAL at 08:06

## 2020-03-16 RX ADMIN — CARBIDOPA AND LEVODOPA 1 TABLET: 10; 100 TABLET ORAL at 21:48

## 2020-03-16 RX ADMIN — SPIRONOLACTONE 25 MG: 25 TABLET, FILM COATED ORAL at 08:07

## 2020-03-16 RX ADMIN — CARVEDILOL 3.12 MG: 3.12 TABLET, FILM COATED ORAL at 20:34

## 2020-03-16 RX ADMIN — TEMAZEPAM 7.5 MG: 7.5 CAPSULE ORAL at 00:07

## 2020-03-16 RX ADMIN — CLOPIDOGREL 75 MG: 75 TABLET, FILM COATED ORAL at 08:06

## 2020-03-16 RX ADMIN — WARFARIN 2.5 MG: 2.5 TABLET ORAL at 17:57

## 2020-03-16 RX ADMIN — FUROSEMIDE 40 MG: 40 TABLET ORAL at 08:06

## 2020-03-16 RX ADMIN — Medication 200 MG: at 08:07

## 2020-03-16 RX ADMIN — ACETAMINOPHEN 650 MG: 325 TABLET, FILM COATED ORAL at 08:07

## 2020-03-16 NOTE — NURSING NOTE
Request OOB to bsc then chair.  C/O not feeling as good as yesterday. NO specific complaint . Denies CP or SOA am labs noted. Call light in reach . SAULO

## 2020-03-16 NOTE — PROGRESS NOTES
Pharmacy Consult: Warfarin Dosing/ Monitoring    Vicky Stewart is a 86 y.o. female, estimated creatinine clearance is 47.4 mL/min (by C-G formula based on SCr of 0.68 mg/dL). weighing 75.6 kg (166 lb 10.7 oz).     has a past medical history of Anemia, Atrial fibrillation (CMS/HCC), Atrial flutter (CMS/HCC), AV block, 3rd degree (CMS/HCC), Carotid artery stenosis, Dermatitis, GERD (gastroesophageal reflux disease), History of EKG (2016), Hyperlipidemia, Hypertension, Low back pain, Osteoarthritis, Osteoarthritis, Osteoporosis, Pacemaker, Palpitations, Raynaud's phenomenon, SOB (shortness of breath) on exertion, SSS (sick sinus syndrome) (CMS/HCC), URI, acute, and Venous insufficiency.    Social History     Tobacco Use   • Smoking status: Former Smoker     Packs/day: 1.50     Start date:      Last attempt to quit:      Years since quittin.2   • Smokeless tobacco: Never Used   • Tobacco comment: SMOKED 1 1/2 PACKS A DAY FOR 45YEARS  QUIT SMOKING 5 YEARS AGO   Substance Use Topics   • Alcohol use: No   • Drug use: No       Results from last 7 days   Lab Units 20  0338 03/15/20  0323 20  0731   INR  2.59* 3.09* 2.47*   HEMOGLOBIN g/dL 10.0* 10.6* 11.3*   HEMATOCRIT % 30.0* 32.0* 35.5   PLATELETS 10*3/mm3 200 214 261     Results from last 7 days   Lab Units 208 03/15/20  0323 20  0731   SODIUM mmol/L 143 139 140   POTASSIUM mmol/L 3.9 5.0 3.7   CHLORIDE mmol/L 106 107 105   CO2 mmol/L 22.6 21.6* 20.8*   BUN mg/dL 15 15 19   CREATININE mg/dL 0.68 0.86 0.80   CALCIUM mg/dL 8.4* 8.6 9.4   BILIRUBIN mg/dL  --   --  0.4   ALK PHOS U/L  --   --  63   ALT (SGPT) U/L  --   --  18   AST (SGOT) U/L  --   --  23   GLUCOSE mg/dL 95 97 131*     Anticoagulation history:   - Per RN, patient states she takes warfarin 5 mg daily on  and 2.5 mg daily on other days.     Hospital Anticoagulation:  Consulting provider: Melquiades Root MD  Start date:  (inpatient)   Indication:  Atrial Fibrillation - requiring full anticoagulation  Target INR: 2-3  Expected duration: indefinite   Bridge Therapy: No  Date 03/14 03/15 03/16          INR 2.47 3.09 2.59          Warfarin dose none none             Potential drug interactions:   - clopidogrel: May enhance the anticoagulant effect of warfarin. (new medication)  - spironolactone: May diminish the anticoagulant effect of warfarin. (home med)  - omeprazole: May enhance the anticoagulant effect of warfarin. (home med, not re-ordered on admission; sticky note entered recommending to stop at DC due to potential interaction with clopidogrel)    Relevant nutrition status: regular diet (vegetarian)     Other: S/p cath on AM of 03/14.     Education complete?/ Date:     Assessment/Plan:  1) Restart warfarin at 2.5 mg PO daily.  2) INR ordered daily with AM labs     Pharmacy will continue to follow until discharge or discontinuation of warfarin.     Luis MarksD, MALIK, BCPS  03/16/20 12:53

## 2020-03-16 NOTE — PLAN OF CARE
Co chest pain at the end of day shift and has not reoccurred thus far. Sinus rhythm but at times looks afib rare occasional paced beats latest bp 108-67o2 sats on RA 97%/ has slept better more this shift. Nausea has resolved. Ate a good HS snack. Could be overflow is md agrees. Troponin trending down as well as CKMB . Daughter to return to see.Pt could likely move out if MD agrees.

## 2020-03-16 NOTE — PROGRESS NOTES
Hospital Follow Up    LOS:  LOS: 2 days   Patient Name: Vicky Stewart  Age/Sex: 86 y.o. female  : 10/18/1933  MRN: [id number]    Day of Service: 20   Length of Stay: 2  Encounter Provider: RONEL Campbell  Place of Service: [facility] CARDIOLOGY  Patient Care Team:  Melquiades Martinez MD as PCP - General (Internal Medicine)    Subjective:     Chief Complaint: Inferior and anterior lateral ST elevation MI    Interval History: Had some chest pain last night but no chest pain today.  Is up out of bed walking around room    Objective:     Objective:  Temp:  [97.8 °F (36.6 °C)-98 °F (36.7 °C)] 97.8 °F (36.6 °C)  Heart Rate:  [79-80] 80  BP: (108-141)/(57-85) 114/78     Intake/Output Summary (Last 24 hours) at 3/16/2020 0927  Last data filed at 3/16/2020 0600  Gross per 24 hour   Intake 1590 ml   Output 1350 ml   Net 240 ml     Body mass index is 31.07 kg/m².      20  1256 03/15/20  0142 20  0008   Weight: 86 kg (189 lb 9.5 oz) 77.2 kg (170 lb 3.1 oz) 75.6 kg (166 lb 10.7 oz)     Weight change: -9.4 kg (-20 lb 11.6 oz)    Physical Exam:   General Appearance:    Awake alert and oriented in no acute distress.   Color:  Skin:  Neuro:  HEENT:    Lungs:     Pink  Warm and dry  No focal, motor or sensory deficits  Neck supple, pupils equal, round and reactive. No JVD, No Bruit  Clear to auscultation,respirations regular, even and                  unlabored    Heart:    Regular rate and rhythm, S1 and S2, no murmur, no gallop, no rub. No edema, DP/PT pulses are 2+.  Right radial cath site stable no hematoma or thrill   Chest Wall:    No abnormalities observed   Abdomen:     Normal bowel sounds, no masses, no organomegaly, soft        non-tender, non-distended, no guarding, no ascites noted   Extremities:   Moves all extremities well, no edema, no cyanosis, no redness       Lab Review:   Results from last 7 days   Lab Units 20  0338 03/15/20  0323  03/14/20  0731   SODIUM mmol/L 143 139 140   POTASSIUM mmol/L 3.9 5.0 3.7   CHLORIDE mmol/L 106 107 105   CO2 mmol/L 22.6 21.6* 20.8*   BUN mg/dL 15 15 19   CREATININE mg/dL 0.68 0.86 0.80   GLUCOSE mg/dL 95 97 131*   CALCIUM mg/dL 8.4* 8.6 9.4   AST (SGOT) U/L  --   --  23   ALT (SGPT) U/L  --   --  18     Results from last 7 days   Lab Units 03/16/20  0338 03/15/20  1859 03/15/20  0323 03/14/20  0731   TROPONIN T ng/mL 1.280* 1.510* 2.320* <0.010     Results from last 7 days   Lab Units 03/16/20  0338 03/15/20  0323   WBC 10*3/mm3 5.53 5.66   HEMOGLOBIN g/dL 10.0* 10.6*   HEMATOCRIT % 30.0* 32.0*   PLATELETS 10*3/mm3 200 214     Results from last 7 days   Lab Units 03/16/20  0338 03/15/20  0323   INR  2.59* 3.09*     Results from last 7 days   Lab Units 03/15/20  0323   MAGNESIUM mg/dL 2.1     Results from last 7 days   Lab Units 03/15/20  0323   CHOLESTEROL mg/dL 121   TRIGLYCERIDES mg/dL 67   HDL CHOL mg/dL 63*             I reviewed the patient's new clinical results.  I personally viewed and interpreted the patient's EKG  Current Medications:   Scheduled Meds:  atorvastatin 20 mg Oral Daily   carbidopa-levodopa 1 tablet Oral Nightly   carvedilol 3.125 mg Oral Q12H   clopidogrel 75 mg Oral Daily   fluticasone 2 spray Nasal Daily   furosemide 40 mg Oral Daily   spironolactone 25 mg Oral Daily   pyridoxine 200 mg Oral Daily     Continuous Infusions:  Pharmacy to dose warfarin        Allergies:  Allergies   Allergen Reactions   • Codeine Nausea And Vomiting   • Penicillins Swelling   • Sulfa Antibiotics Nausea And Vomiting   • Tramadol Nausea And Vomiting       Assessment:     1.  Anterolateral ST elevation MI  2.  Multivessel coronary disease  3.  History of atrial fibrillation  4.  Ischemic cardiomyopathy/ BIV failure EF 35  5.  Chronic systolic heart failure  6.  Essential hypertension    Plan:      Patient's INR is therapeutic today.  Her labs are stable.  I am can add some low-dose isosorbide to see if this  helps some of her symptoms.  She did get some chest tightness last night.  Right radial cath site is stable.    RONEL Campbell  03/16/20  09:27  Electronically signed by RONEL Campbell, 03/16/20, 9:27 AM.

## 2020-03-16 NOTE — PROGRESS NOTES
Discharge Planning Assessment  [facility]     Patient Name: Vicky Stewart  MRN: [id number]  Today's Date: 3/16/2020    Admit Date: 3/14/2020    Discharge Needs Assessment     Row Name 03/16/20 0936       Living Environment    Lives With  alone    Current Living Arrangements  home/apartment/condo    Potentially Unsafe Housing Conditions  unable to assess    Primary Care Provided by  self    Provides Primary Care For  no one    Family Caregiver if Needed  child(roberta), adult    Quality of Family Relationships  supportive    Able to Return to Prior Arrangements  yes       Resource/Environmental Concerns    Resource/Environmental Concerns  none    Transportation Concerns  car, none       Transition Planning    Patient/Family Anticipates Transition to  home    Patient/Family Anticipated Services at Transition  none    Transportation Anticipated  family or friend will provide       Discharge Needs Assessment    Concerns to be Addressed  discharge planning    Equipment Currently Used at Home  none;oxygen    Anticipated Changes Related to Illness  none    Equipment Needed After Discharge  none    Outpatient/Agency/Support Group Needs  homecare agency    Discharge Facility/Level of Care Needs  home with home health    Provided Post Acute Provider List?  Yes    Post Acute Provider List  Home Health    Current Discharge Risk  lives alone        Discharge Plan     Row Name 03/16/20 0939       Plan    Plan  Home with   list given for choice    Plan Comments  IMM noted.  CCP spoke with patient at bedside to discuss d/c planning. Face sheet verified. CCP role explained.  Pt emergency contact is her daughter Viry, [phone].  Pt PCP is Dr. Melquiades Martinez.  Pt is independent with ADL's.  She uses no DME to ambulate.  She does have a portable oxygen tank at [facility] and has been scheduled for a sleep study.  She lives in a one story house alone.  Pt has no past Home Health History.  She has not been to rehab.   [facility]  [facility] [address]  Plan is home with Home Health  List from CMS compare given to pt.   CCP following.        Destination      Coordination has not been started for this encounter.      Durable Medical Equipment      Coordination has not been started for this encounter.      Dialysis/Infusion      Coordination has not been started for this encounter.      Home Medical Care      Coordination has not been started for this encounter.      Therapy      Coordination has not been started for this encounter.      Community Resources      Coordination has not been started for this encounter.          Demographic Summary    No documentation.       Functional Status    No documentation.       Psychosocial    No documentation.       Abuse/Neglect    No documentation.       Legal    No documentation.       Substance Abuse    No documentation.       Patient Forms    No documentation.           Lucy Zuniga RN

## 2020-03-16 NOTE — PROGRESS NOTES
Discharge Planning Assessment  [facility]     Patient Name: Vicky Stewart  MRN: [id number]  Today's Date: 3/16/2020    Admit Date: 3/14/2020    Discharge Needs Assessment    No documentation.       Discharge Plan     Row Name 03/16/20 1523       Plan    Plan  Home with [facility]     Plan Comments  Request a refeerral to [facility]   Gabriela will follow        Destination      Coordination has not been started for this encounter.      Durable Medical Equipment      Coordination has not been started for this encounter.      Dialysis/Infusion      Coordination has not been started for this encounter.      Home Medical Care      Coordination has not been started for this encounter.      Therapy      Coordination has not been started for this encounter.      Community Resources      Coordination has not been started for this encounter.          Demographic Summary    No documentation.       Functional Status    No documentation.       Psychosocial    No documentation.       Abuse/Neglect    No documentation.       Legal    No documentation.       Substance Abuse    No documentation.       Patient Forms    No documentation.           Lucy Zuniga RN

## 2020-03-17 VITALS
WEIGHT: 166.4 LBS | HEART RATE: 93 BPM | SYSTOLIC BLOOD PRESSURE: 105 MMHG | OXYGEN SATURATION: 92 % | BODY MASS INDEX: 31.42 KG/M2 | TEMPERATURE: 98.6 F | HEIGHT: 61 IN | DIASTOLIC BLOOD PRESSURE: 67 MMHG | RESPIRATION RATE: 18 BRPM

## 2020-03-17 LAB
ANION GAP SERPL CALCULATED.3IONS-SCNC: 13.3 MMOL/L (ref 5–15)
BASOPHILS # BLD AUTO: 0.05 10*3/MM3 (ref 0–0.2)
BASOPHILS NFR BLD AUTO: 0.8 % (ref 0–1.5)
BUN BLD-MCNC: 13 MG/DL (ref 8–23)
BUN/CREAT SERPL: 21 (ref 7–25)
CALCIUM SPEC-SCNC: 8.6 MG/DL (ref 8.6–10.5)
CHLORIDE SERPL-SCNC: 104 MMOL/L (ref 98–107)
CO2 SERPL-SCNC: 22.7 MMOL/L (ref 22–29)
CREAT BLD-MCNC: 0.62 MG/DL (ref 0.57–1)
DEPRECATED RDW RBC AUTO: 48.3 FL (ref 37–54)
EOSINOPHIL # BLD AUTO: 0.09 10*3/MM3 (ref 0–0.4)
EOSINOPHIL NFR BLD AUTO: 1.4 % (ref 0.3–6.2)
ERYTHROCYTE [DISTWIDTH] IN BLOOD BY AUTOMATED COUNT: 15.7 % (ref 12.3–15.4)
GFR SERPL CREATININE-BSD FRML MDRD: 91 ML/MIN/1.73
GLUCOSE BLD-MCNC: 97 MG/DL (ref 65–99)
HCT VFR BLD AUTO: 30.2 % (ref 34–46.6)
HGB BLD-MCNC: 10.1 G/DL (ref 12–15.9)
IMM GRANULOCYTES # BLD AUTO: 0.02 10*3/MM3 (ref 0–0.05)
IMM GRANULOCYTES NFR BLD AUTO: 0.3 % (ref 0–0.5)
INR PPP: 1.9 (ref 0.9–1.1)
LYMPHOCYTES # BLD AUTO: 1.47 10*3/MM3 (ref 0.7–3.1)
LYMPHOCYTES NFR BLD AUTO: 23.3 % (ref 19.6–45.3)
MCH RBC QN AUTO: 28.6 PG (ref 26.6–33)
MCHC RBC AUTO-ENTMCNC: 33.4 G/DL (ref 31.5–35.7)
MCV RBC AUTO: 85.6 FL (ref 79–97)
MONOCYTES # BLD AUTO: 0.62 10*3/MM3 (ref 0.1–0.9)
MONOCYTES NFR BLD AUTO: 9.8 % (ref 5–12)
NEUTROPHILS # BLD AUTO: 4.05 10*3/MM3 (ref 1.7–7)
NEUTROPHILS NFR BLD AUTO: 64.4 % (ref 42.7–76)
NRBC BLD AUTO-RTO: 0 /100 WBC (ref 0–0.2)
PLATELET # BLD AUTO: 193 10*3/MM3 (ref 140–450)
PMV BLD AUTO: 11.6 FL (ref 6–12)
POTASSIUM BLD-SCNC: 3.6 MMOL/L (ref 3.5–5.2)
PROTHROMBIN TIME: 21.5 SECONDS (ref 11.7–14.2)
RBC # BLD AUTO: 3.53 10*6/MM3 (ref 3.77–5.28)
SODIUM BLD-SCNC: 140 MMOL/L (ref 136–145)
WBC NRBC COR # BLD: 6.3 10*3/MM3 (ref 3.4–10.8)

## 2020-03-17 PROCEDURE — 85610 PROTHROMBIN TIME: CPT | Performed by: NURSE PRACTITIONER

## 2020-03-17 PROCEDURE — 85025 COMPLETE CBC W/AUTO DIFF WBC: CPT | Performed by: INTERNAL MEDICINE

## 2020-03-17 PROCEDURE — 80048 BASIC METABOLIC PNL TOTAL CA: CPT | Performed by: NURSE PRACTITIONER

## 2020-03-17 PROCEDURE — 99238 HOSP IP/OBS DSCHRG MGMT 30/<: CPT | Performed by: INTERNAL MEDICINE

## 2020-03-17 RX ORDER — CLOPIDOGREL BISULFATE 75 MG/1
75 TABLET ORAL DAILY
Qty: 30 TABLET | Refills: 11 | Status: SHIPPED | OUTPATIENT
Start: 2020-03-18 | End: 2021-03-02

## 2020-03-17 RX ORDER — WARFARIN SODIUM 5 MG/1
5 TABLET ORAL
Status: DISCONTINUED | OUTPATIENT
Start: 2020-03-17 | End: 2020-03-17

## 2020-03-17 RX ORDER — WARFARIN SODIUM 5 MG/1
TABLET ORAL
Qty: 30 TABLET | Refills: 1 | Status: CANCELLED | OUTPATIENT
Start: 2020-03-17

## 2020-03-17 RX ORDER — WARFARIN SODIUM 5 MG/1
5 TABLET ORAL
Status: COMPLETED | OUTPATIENT
Start: 2020-03-17 | End: 2020-03-17

## 2020-03-17 RX ORDER — WARFARIN SODIUM 5 MG/1
TABLET ORAL
Qty: 30 TABLET | Refills: 3 | Status: SHIPPED | OUTPATIENT
Start: 2020-03-17

## 2020-03-17 RX ORDER — WARFARIN SODIUM 2.5 MG/1
TABLET ORAL
Qty: 30 TABLET | Refills: 3 | Status: SHIPPED | OUTPATIENT
Start: 2020-03-18

## 2020-03-17 RX ORDER — WARFARIN SODIUM 2.5 MG/1
2.5 TABLET ORAL
Status: DISCONTINUED | OUTPATIENT
Start: 2020-03-18 | End: 2020-03-17 | Stop reason: HOSPADM

## 2020-03-17 RX ORDER — WARFARIN SODIUM 2.5 MG/1
TABLET ORAL
Qty: 30 TABLET | Refills: 2 | Status: CANCELLED | OUTPATIENT
Start: 2020-03-17

## 2020-03-17 RX ORDER — ISOSORBIDE MONONITRATE 30 MG/1
30 TABLET, EXTENDED RELEASE ORAL
Qty: 30 TABLET | Refills: 6 | Status: SHIPPED | OUTPATIENT
Start: 2020-03-18 | End: 2020-03-27

## 2020-03-17 RX ORDER — CARVEDILOL 3.12 MG/1
3.12 TABLET ORAL EVERY 12 HOURS SCHEDULED
Qty: 60 TABLET | Refills: 6 | Status: ON HOLD | OUTPATIENT
Start: 2020-03-17 | End: 2020-07-09

## 2020-03-17 RX ADMIN — TEMAZEPAM 7.5 MG: 7.5 CAPSULE ORAL at 03:19

## 2020-03-17 RX ADMIN — WARFARIN 5 MG: 5 TABLET ORAL at 13:26

## 2020-03-17 RX ADMIN — SPIRONOLACTONE 25 MG: 25 TABLET, FILM COATED ORAL at 08:35

## 2020-03-17 RX ADMIN — Medication 200 MG: at 08:35

## 2020-03-17 RX ADMIN — ISOSORBIDE MONONITRATE 30 MG: 30 TABLET ORAL at 08:35

## 2020-03-17 RX ADMIN — CLOPIDOGREL 75 MG: 75 TABLET, FILM COATED ORAL at 08:35

## 2020-03-17 RX ADMIN — CARVEDILOL 3.12 MG: 3.12 TABLET, FILM COATED ORAL at 08:35

## 2020-03-17 RX ADMIN — ACETAMINOPHEN 650 MG: 325 TABLET, FILM COATED ORAL at 03:19

## 2020-03-17 RX ADMIN — FUROSEMIDE 40 MG: 40 TABLET ORAL at 08:35

## 2020-03-17 NOTE — DISCHARGE SUMMARY
Vicky Stewart  [id number]    Date of Admit: 3/14/2020  Date of Discharge:  3/17/2020    Discharge Diagnosis:  Active Hospital Problems    Diagnosis  POA   • Acute MI, inferior wall (CMS/HCC) [I21.19]  Yes     Priority: High      Resolved Hospital Problems   No resolved problems to display.       Hospital Course: This is an 86-year-old lady with a history of A. fib and a pacemaker who came in with an extensive anterior lateral STEMI.  She was taken directly to the Cath Lab where her proximal RCA was occluded it was treated with angioplasty and drug-eluting stenting.  She also was found to have severe distal left main disease with involvement of the origin of the LAD and circumflex.  Given her age she is felt not to be a candidate for surgical revascularization and we feel the best option for therapy would be to continue with a period of medical therapy.  Her ejection fraction is 35%.  Her blood pressure has been low so she is not been placed on an ACE or an arm because of that but she is been started on beta-blockade as well as Aldactone.  Due to the coronavirus we will perform a little bit of different follow-up she will get a phone call in a week  And then we will have her come back and see us in a month.  Dr. Méndez is her normal doctor  Procedures Performed  Procedure(s):  Coronary angiography  Percutaneous Manual Thrombectomy  Stent CAROLYNN coronary       Consults     Date and Time Order Name Status Description    3/14/2020 0754 Consult Interventional Cardiology and Notify Cath Lab Completed     3/14/2020 0731 Consult Interventional Cardiology and Notify Cath Lab Completed           Discharge Medications     Your medication list      ASK your doctor about these medications      Instructions Last Dose Given Next Dose Due   acetaminophen 325 MG tablet  Commonly known as:  TYLENOL      Take 325 mg by mouth 2 (two) times a day as needed for mild pain (1-3).       amLODIPine 2.5 MG tablet  Commonly known as:   NORVASC      Take 2.5 mg by mouth Daily.       atorvastatin 40 MG tablet  Commonly known as:  LIPITOR      Take 0.5 tablets by mouth daily.       carbidopa-levodopa  MG per tablet  Commonly known as:  SINEMET             ferrous sulfate 325 (65 FE) MG tablet      Take 325 mg by mouth. Twice  a week       fluticasone 50 MCG/ACT nasal spray  Commonly known as:  FLONASE      2 sprays into each nostril daily.       Fluzone High-Dose 0.5 ML suspension prefilled syringe injection  Generic drug:  influenza vac split high-dose      TO BE ADMINISTERED BY PHARMACIST FOR IMMUNIZATION       furosemide 40 MG tablet  Commonly known as:  LASIX      TAKE 1 TABLET BY MOUTH EVERY DAY       loratadine 10 MG tablet  Commonly known as:  CLARITIN      Take 1 tablet by mouth daily.       losartan 100 MG tablet  Commonly known as:  COZAAR      Take 100 mg by mouth Daily.       mupirocin 2 % ointment  Commonly known as:  Bactroban      Apply to affected area with each dressing change.       omeprazole 20 MG capsule  Commonly known as:  priLOSEC      Take 1 capsule by mouth daily.       pyridoxine 200 MG tablet  Commonly known as:  VITAMIN B-6      Take 1 tablet by mouth daily.       saccharomyces boulardii 250 MG capsule  Commonly known as:  FLORASTOR      Take 1 capsule by mouth 2 (Two) Times a Day.       spironolactone 25 MG tablet  Commonly known as:  ALDACTONE      TAKE 1 TABLET BY MOUTH EVERY DAY       temazepam 7.5 MG capsule  Commonly known as:  RESTORIL      15 mg.       umeclidinium-vilanterol 62.5-25 MCG/INH aerosol powder  inhaler  Commonly known as:  ANORO ELLIPTA      Inhale 1 puff Daily.       Ventolin  (90 Base) MCG/ACT inhaler  Generic drug:  albuterol sulfate HFA      INHALE 2 PUFFS BY MOUTH EVERY 4 HOURS AS NEEDED       vitamin D3 125 MCG (5000 UT) capsule capsule      Take 5,000 Units by mouth As Needed.       warfarin 5 MG tablet  Commonly known as:  COUMADIN      TAKE ONE TABLET BY MOUTH DAILY AS  DIRECTED              Discharge Diet:     Activity at Discharge:     Discharge disposition: home    Condition on Discharge: stable    Follow-up Appointments  Future Appointments   Date Time Provider Department Center   5/1/2020 12:00 AM HANNY REMOTE, RUTHIE TEAGUE CD [facility] None   8/5/2020  1:00 PM HANNY IN OFFICE, RUTHIE TEAGUE [facility] None   8/5/2020  1:20 PM Kale Méndez MD [facility] None     Additional Instructions for the Follow-ups that You Need to Schedule     Ambulatory Referral to Cardiac Rehab   As directed            Test Results Pending at Discharge       Melquiades Root MD  03/17/20  11:56

## 2020-03-17 NOTE — PLAN OF CARE
Problem: Patient Care Overview  Goal: Plan of Care Review  Outcome: Ongoing (interventions implemented as appropriate)  Flowsheets (Taken 3/17/2020 0331)  Progress: improving  Plan of Care Reviewed With: patient  Outcome Summary: Pt. admitted on 3/14/2020 with jaw and chest pain. Went to cath lab on 3/14 with 2 stents placed to RCA right radial approach. Running NSR has pacer. Paced on demand. No complaints of chest pain. Will continue to monitor.

## 2020-03-17 NOTE — PROGRESS NOTES
Vicky Stewart was counseled over post-PCI medications prior to discharge.  Counseling points included the followin) Plavix's indication, patient's need for the medication, and dosing/frequency  2) Enforced the importance of taking all meds as instructed every day  3) Explained possible side effects of plavix therapy, including increased risk of bleeding, s/sx of bleeding, and increase in cold intolerance. Also talked about ways to control bleeding for minor cuts and scrapes.  4) Discussed all important drug interactions, including over-the-counter medications and supplements.    Patient expressed understanding and had no further questions.      Silviano Pryor, [facility]  Clinical Staff Pharmacist    Post-PCI medications appropriate:  Aspirin: n/a  already on Plavix and warfarin  High-dose Statin: 40 mg atorvastatin  Thienopyridine: plavix 75 mg  Beta-blocker: carvedilol 3.125 mg  ACE/ARB (EF<40): n/a switched to the beta blocker

## 2020-03-17 NOTE — DISCHARGE INSTR - ACTIVITY
1. No submerging procedure sites below water for 7-10 days  2. No lifting objects greater than 1 lbs for 3 days  3. If groin site used, avoid climbing several flights of stairs or sitting for longer than 2 hours at a time for the next 24 hours.   4. Monitor puncture site for bleeding and/or knots. If bleeding should occur at the groin site, lie flat, apply pressure and return to the ER. If bleeding should occur at the wrist site, apply pressure and return to the ER.   5. You may apply a DRY band-aide over the puncture site if needed. Do not apply lotions, salves, or ointments to the site.   6. No driving for 3 days.  7. Return to the ER for recurrent symptoms.  9. Take all medications as prescribed.

## 2020-03-17 NOTE — PROGRESS NOTES
Pharmacy Consult: Warfarin Dosing/ Monitoring    Vicky Stewart is a 86 y.o. female, estimated creatinine clearance is 47.4 mL/min (by C-G formula based on SCr of 0.62 mg/dL). weighing 75.5 kg (166 lb 6.4 oz).     has a past medical history of Anemia, Atrial fibrillation (CMS/HCC), Atrial flutter (CMS/HCC), AV block, 3rd degree (CMS/HCC), Carotid artery stenosis, Dermatitis, GERD (gastroesophageal reflux disease), History of EKG (2016), Hyperlipidemia, Hypertension, Low back pain, Osteoarthritis, Osteoarthritis, Osteoporosis, Pacemaker, Palpitations, Raynaud's phenomenon, SOB (shortness of breath) on exertion, SSS (sick sinus syndrome) (CMS/HCC), URI, acute, and Venous insufficiency.    Social History     Tobacco Use   • Smoking status: Former Smoker     Packs/day: 1.50     Start date:      Last attempt to quit:      Years since quittin.2   • Smokeless tobacco: Never Used   • Tobacco comment: SMOKED 1 1/2 PACKS A DAY FOR 45YEARS  QUIT SMOKING 5 YEARS AGO   Substance Use Topics   • Alcohol use: No   • Drug use: No       Results from last 7 days   Lab Units 03/17/20  0418 03/16/20  0338 03/15/20  0323 03/14/20  0731   INR  1.90* 2.59* 3.09* 2.47*   HEMOGLOBIN g/dL 10.1* 10.0* 10.6* 11.3*   HEMATOCRIT % 30.2* 30.0* 32.0* 35.5   PLATELETS 10*3/mm3 193 200 214 261     Results from last 7 days   Lab Units 208 03/15/20  0323 03/14/20  0731   SODIUM mmol/L 140 143 139 140   POTASSIUM mmol/L 3.6 3.9 5.0 3.7   CHLORIDE mmol/L 104 106 107 105   CO2 mmol/L 22.7 22.6 21.6* 20.8*   BUN mg/dL 13 15 15 19   CREATININE mg/dL 0.62 0.68 0.86 0.80   CALCIUM mg/dL 8.6 8.4* 8.6 9.4   BILIRUBIN mg/dL  --   --   --  0.4   ALK PHOS U/L  --   --   --  63   ALT (SGPT) U/L  --   --   --  18   AST (SGOT) U/L  --   --   --  23   GLUCOSE mg/dL 97 95 97 131*     Anticoagulation history:   - Per RN, patient states she takes warfarin 5 mg daily on  and 2.5 mg daily on other days.     Hospital  Anticoagulation:  Consulting provider: Melquiades Root MD  Start date: 03/14 (inpatient)   Indication: Atrial Fibrillation - requiring full anticoagulation  Target INR: 2-3  Expected duration: indefinite   Bridge Therapy: No  Date 03/14 03/15 03/16 3/17         INR 2.47 3.09 2.59 1.9         Warfarin dose none none 2.5 mg             Potential drug interactions:   - clopidogrel: May enhance the anticoagulant effect of warfarin. (new medication)  - spironolactone: May diminish the anticoagulant effect of warfarin. (home med)  - omeprazole: May enhance the anticoagulant effect of warfarin. (home med, not re-ordered on admission; sticky note entered recommending to stop at DC due to potential interaction with clopidogrel)    Relevant nutrition status: regular diet (vegetarian)     Other: S/p cath on AM of 03/14.     Education complete?/ Date: 3/17    Assessment/Plan:  1) Will give 5 mg today, then resume 2.5 mg daily  2) INR ordered daily with AM labs     Pharmacy will continue to follow until discharge or discontinuation of warfarin.     Silviano Pryor RPH  03/17/20 11:38

## 2020-03-18 ENCOUNTER — READMISSION MANAGEMENT (OUTPATIENT)
Dept: CALL CENTER | Facility: HOSPITAL | Age: 85
End: 2020-03-18

## 2020-03-18 RX ORDER — FUROSEMIDE 40 MG/1
TABLET ORAL
Qty: 90 TABLET | Refills: 1 | Status: SHIPPED | OUTPATIENT
Start: 2020-03-18 | End: 2020-09-08

## 2020-03-19 ENCOUNTER — READMISSION MANAGEMENT (OUTPATIENT)
Dept: CALL CENTER | Facility: HOSPITAL | Age: 85
End: 2020-03-19

## 2020-03-19 NOTE — OUTREACH NOTE
Prep Survey      Responses   [facility] facility patient discharged from?  [place]   Is LACE score < 7 ?  No   Eligibility  Readm Mgmt   Discharge diagnosis  Acute MI,   heart cath and stent   Does the patient have one of the following disease processes/diagnoses(primary or secondary)?  Acute MI (STEMI,NSTEMI)   Does the patient have Home health ordered?  Yes   What is the Home health agency?   [facility]    Is there a DME ordered?  No   Prep survey completed?  Yes          Sydnee Callaway RN

## 2020-03-19 NOTE — OUTREACH NOTE
AMI Week 1 Survey      Responses   [facility] patient discharged from?  [place]   Does the patient have one of the following disease processes/diagnoses(primary or secondary)?  Acute MI (STEMI,NSTEMI)   Is there a successful TCM telephone encounter documented?  No   Week 1 attempt successful?  Yes   Call start time  1745   Call end time  1751   Discharge diagnosis  Acute MI,   heart cath and stent   Meds reviewed with patient/caregiver?  Yes   Is the patient having any side effects they believe may be caused by any medication additions or changes?  No   Does the patient have all prescriptions related to this admission filled (includes statins,anticoagulants,HTN meds,anti-arrhythmia meds)  Yes   Is the patient taking all medications as directed (includes completed medication regime)?  Yes   Comments regarding appointments  Followup on 4/21/2020   Does the patient have a primary care provider?   Yes   Does the patient have an appointment with their PCP,cardiologist,or clinic within 7 days of discharge?  Greater than 7 days   Nursing Interventions  Advised patient to make appointment, Verified appointment date/time/provider   What is the Home health agency?   [facility]    Has home health visited the patient within 72 hours of discharge?  Yes   Psychosocial issues?  No   Comments  Patient is doing well. No chest pain or SOB.    Did the patient receive a copy of their discharge instructions?  Yes   Nursing interventions  Reviewed instructions with patient   What is the patient's perception of their health status since discharge?  Improving   Nursing interventions  Nurse provided patient education   Is the patient/caregiver able to teach back signs and symptoms of when to call for help immediately:  Sudden chest discomfort, Sudden discomfort in arms, back, neck or jaw, Shortness of breath at any time, Sudden sweating or clammy skin, Nausea or vomiting, Dizziness or lightheadedness, Irregular or rapid heart rate    Nursing interventions  Nurse provided patient education   Is the pateint /caregiver able to teach back the importance of cardiac rehab?  Yes   Is the patient/caregiver able to teach back lifestyle changes to help prevent MIs  Maintaining a healthy weight, Reducing stress, Regular exercise as approved by provider   Is the patient/caregiver able to teach back ways to prevent a second heart attack:  Take medications, Follow up with MD, Participate in Cardiac Rehab, Manage risk factors   Is the patient/caregiver able to teach back the hierarchy of who to call/visit for symptoms/problems? PCP, Specialist, Home health nurse, Urgent Care, ED, 911  Yes   Week 1 call completed?  Yes          Luke Kasper RN

## 2020-03-24 ENCOUNTER — TELEPHONE (OUTPATIENT)
Dept: CARDIOLOGY | Facility: CLINIC | Age: 85
End: 2020-03-24

## 2020-03-27 ENCOUNTER — TELEMEDICINE (OUTPATIENT)
Dept: CARDIOLOGY | Facility: CLINIC | Age: 85
End: 2020-03-27

## 2020-03-27 ENCOUNTER — READMISSION MANAGEMENT (OUTPATIENT)
Dept: CALL CENTER | Facility: HOSPITAL | Age: 85
End: 2020-03-27

## 2020-03-27 VITALS
WEIGHT: 160 LBS | SYSTOLIC BLOOD PRESSURE: 128 MMHG | HEART RATE: 79 BPM | OXYGEN SATURATION: 100 % | BODY MASS INDEX: 29.82 KG/M2 | DIASTOLIC BLOOD PRESSURE: 86 MMHG | TEMPERATURE: 98.2 F

## 2020-03-27 DIAGNOSIS — I10 ESSENTIAL HYPERTENSION: ICD-10-CM

## 2020-03-27 DIAGNOSIS — I20.9 ANGINA PECTORIS (HCC): Primary | ICD-10-CM

## 2020-03-27 DIAGNOSIS — I25.5 ISCHEMIC CARDIOMYOPATHY: ICD-10-CM

## 2020-03-27 DIAGNOSIS — I25.118 CORONARY ARTERY DISEASE OF NATIVE ARTERY OF NATIVE HEART WITH STABLE ANGINA PECTORIS (HCC): ICD-10-CM

## 2020-03-27 DIAGNOSIS — Z95.0 CARDIAC PACEMAKER IN SITU: ICD-10-CM

## 2020-03-27 DIAGNOSIS — I48.21 PERMANENT ATRIAL FIBRILLATION (HCC): ICD-10-CM

## 2020-03-27 PROCEDURE — 99442 PR PHYS/QHP TELEPHONE EVALUATION 11-20 MIN: CPT | Performed by: NURSE PRACTITIONER

## 2020-03-27 RX ORDER — RANOLAZINE 500 MG/1
500 TABLET, EXTENDED RELEASE ORAL 2 TIMES DAILY
Qty: 60 TABLET | Refills: 11 | Status: SHIPPED | OUTPATIENT
Start: 2020-03-27 | End: 2020-04-21

## 2020-03-27 NOTE — PROGRESS NOTES
Date of Office Visit: 2020  Encounter Provider: RONEL Campbell  Place of Service: [facility] CARDIOLOGY  Patient Name: Vicky Stewart  :10/18/1933    Chief Complaint   Patient presents with   • Atrial Fibrillation     hospital follow up    :     HPI: Fermin Stewart is a 86-year-old female who is known to myself and Dr. Root.  She presents today for 1 week hospital follow-up.  She has a history of permanent atrial fibrillation sick sinus syndrome and third-degree AV block and has a permanent pacemaker.  On  she presented to the emergency room around 3 AM with severe chest pain and shortness of breath.  She was having an extensive inferior and anterior lateral MI.  She was taken directly to the Cath Lab.  She was found to have an occlusion in the proximal right coronary and underwent angioplasty and drug-eluting stent.  She was also found to have severe distal left main disease with involvement of the origin of the LAD and circumflex.  Given her age it was felt that she was not a surgical candidate for revascularization and the best option would be to proceed with medical therapy.  Her ejection fraction was 35%.  Her blood pressure was had been low and she was not started on ACE or are up because of this.  But she did get started on a small dose of beta-blocker and Aldactone.      Since leaving the hospital patient has occasional twinge of pain in the chest.  Today she feels a little short of breath but has not had any change in her weight, she has no edema, or orthopnea.  She does complain that she has had a pretty significant headache since starting isosorbide.    Past Medical History:   Diagnosis Date   • Acute MI, inferior wall (CMS/HCC) 2020   • Anemia    • Atrial fibrillation (CMS/HCC)    • Atrial flutter (CMS/HCC)    • AV block, 3rd degree (CMS/HCC)    • Carotid artery stenosis    • Dermatitis    • GERD (gastroesophageal reflux disease)    •  History of EKG 2016   • Hyperlipidemia    • Hypertension    • Low back pain    • Osteoarthritis     knee   • Osteoporosis    • Pacemaker    • Palpitations    • Raynaud's phenomenon    • SOB (shortness of breath) on exertion    • SSS (sick sinus syndrome) (CMS/HCC)    • URI, acute    • Venous insufficiency        Past Surgical History:   Procedure Laterality Date   • BILE DUCT STENT PLACEMENT      choledochal   • BREAST BIOPSY Left     benign   • CARDIAC CATHETERIZATION N/A 3/14/2020    Procedure: Coronary angiography;  Surgeon: Melquiades Root MD;  Location:  [facility] CATH INVASIVE LOCATION;  Service: Cardiovascular;  Laterality: N/A;   • CARDIAC CATHETERIZATION  3/14/2020    Procedure: Percutaneous Manual Thrombectomy;  Surgeon: Melquiades Root MD;  Location:  [facility] CATH INVASIVE LOCATION;  Service: Cardiovascular;;   • CARDIAC CATHETERIZATION N/A 3/14/2020    Procedure: Stent CAROLYNN coronary;  Surgeon: Melquiades Root MD;  Location:  [facility] CATH INVASIVE LOCATION;  Service: Cardiovascular;  Laterality: N/A;   • CARDIAC ELECTROPHYSIOLOGY PROCEDURE N/A 2016    Procedure: Lead Revision PPM [place];  Surgeon: Kale Méndez MD;  Location: [facility] CATH INVASIVE LOCATION;  Service:    • CATARACT EXTRACTION W/ INTRAOCULAR LENS  IMPLANT, BILATERAL Bilateral    • CHOLECYSTECTOMY     • COLONOSCOPY  2012   • FACIAL COSMETIC SURGERY     • OTHER SURGICAL HISTORY      interrogation of implantable loop recorder remote, up to 30 days   • PACEMAKER IMPLANTATION     • SINUS SURGERY         Social History     Socioeconomic History   • Marital status:      Spouse name: Not on file   • Number of children: Not on file   • Years of education: Not on file   • Highest education level: Not on file   Occupational History   • Occupation: retired   Tobacco Use   • Smoking status: Former Smoker     Packs/day: 1.50     Start date:      Last attempt to quit:      Years since quittin.2   • Smokeless tobacco: Never Used    • Tobacco comment: SMOKED 1 1/2 PACKS A DAY FOR 45YEARS  QUIT SMOKING 5 YEARS AGO   Substance and Sexual Activity   • Alcohol use: No   • Drug use: No   • Sexual activity: Defer     Comment:        Family History   Problem Relation Age of Onset   • Heart disease Mother    • Stroke Mother    • Heart disease Father    • Diabetes Sister    • Hodgkin's lymphoma Sister    • Heart disease Other    • Stroke Other    • Endometrial cancer Sister        Review of Systems   Constitution: Negative for diaphoresis and malaise/fatigue.   Cardiovascular: Negative for chest pain, claudication, dyspnea on exertion, irregular heartbeat, leg swelling, near-syncope, orthopnea, palpitations, paroxysmal nocturnal dyspnea and syncope.   Respiratory: Positive for shortness of breath. Negative for cough and sleep disturbances due to breathing.    Musculoskeletal: Negative for falls.   Neurological: Positive for headaches. Negative for dizziness and weakness.   Psychiatric/Behavioral: Negative for altered mental status and substance abuse.       Allergies   Allergen Reactions   • Codeine Nausea And Vomiting   • Penicillins Swelling   • Sulfa Antibiotics Nausea And Vomiting   • Tramadol Nausea And Vomiting         Current Outpatient Medications:   •  acetaminophen (TYLENOL) 325 MG tablet, Take 325 mg by mouth 2 (two) times a day as needed for mild pain (1-3)., Disp: , Rfl:   •  atorvastatin (LIPITOR) 40 MG tablet, Take 0.5 tablets by mouth daily., Disp: , Rfl:   •  carbidopa-levodopa (SINEMET)  MG per tablet, , Disp: , Rfl: 11  •  carvedilol (COREG) 3.125 MG tablet, Take 1 tablet by mouth Every 12 (Twelve) Hours., Disp: 60 tablet, Rfl: 6  •  Cholecalciferol (VITAMIN D3) 5000 UNITS capsule capsule, Take 5,000 Units by mouth As Needed., Disp: , Rfl:   •  clopidogrel (PLAVIX) 75 MG tablet, Take 1 tablet by mouth Daily., Disp: 30 tablet, Rfl: 11  •  ferrous sulfate 325 (65 FE) MG tablet, Take 325 mg by mouth. Twice  a week,  Disp: , Rfl:   •  fluticasone (FLONASE) 50 MCG/ACT nasal spray, 2 sprays into each nostril daily., Disp: , Rfl:   •  furosemide (LASIX) 40 MG tablet, TAKE 1 TABLET BY MOUTH EVERY DAY, Disp: 90 tablet, Rfl: 1  •  loratadine (CLARITIN) 10 MG tablet, Take 1 tablet by mouth daily., Disp: , Rfl:   •  mupirocin (BACTROBAN) 2 % ointment, Apply to affected area with each dressing change., Disp: 22 g, Rfl: 0  •  pyridoxine (B-6) 200 MG tablet, Take 1 tablet by mouth daily., Disp: , Rfl:   •  spironolactone (ALDACTONE) 25 MG tablet, TAKE 1 TABLET BY MOUTH EVERY DAY, Disp: 30 tablet, Rfl: 1  •  temazepam (RESTORIL) 7.5 MG capsule, 15 mg., Disp: , Rfl: 1  •  Umeclidinium-Vilanterol 62.5-25 MCG/INH aerosol powder , Inhale 1 puff Daily., Disp: , Rfl:   •  VENTOLIN  (90 BASE) MCG/ACT inhaler, INHALE 2 PUFFS BY MOUTH EVERY 4 HOURS AS NEEDED, Disp: , Rfl: 4  •  warfarin (COUMADIN) 2.5 MG tablet, Atrial fibrillation  Indications: Atrial Fibrillation, Disp: 30 tablet, Rfl: 3  •  warfarin (COUMADIN) 5 MG tablet, Atrial fibrillation  Indications: Atrial Fibrillation, Disp: 30 tablet, Rfl: 3  •  ranolazine (Ranexa) 500 MG 12 hr tablet, Take 1 tablet by mouth 2 (Two) Times a Day., Disp: 60 tablet, Rfl: 11  •  saccharomyces boulardii (FLORASTOR) 250 MG capsule, Take 1 capsule by mouth 2 (Two) Times a Day., Disp: , Rfl:       Objective:     Vitals:    03/27/20 1056   BP: 128/86   BP Location: Left arm   Patient Position: Sitting   Cuff Size: Adult   Pulse: 79   Temp: 98.2 °F (36.8 °C)   SpO2: 100%   Weight: 72.6 kg (160 lb)     Body mass index is 29.82 kg/m².    PHYSICAL EXAM:    Physical Exam    Procedures      Assessment:       Diagnosis Plan   1. Angina pectoris (CMS/HCC)     2. Coronary artery disease of native artery of native heart with stable angina pectoris (CMS/HCC)     3. Ischemic cardiomyopathy     4. Essential hypertension     5. Permanent atrial fibrillation     6. Cardiac pacemaker in situ       No orders of the defined  types were placed in this encounter.         Plan:       Destini stop the isosorbide and put her on some Ranexa 500 mg twice a day.  I want to talk to her again on the phone in 2 weeks.  If her blood pressure stable and the headache is resolved I am going to start her on an ACE inhibitor low-dose.  As far as her follow-up with Dr. Root in April we will see if we are back in the office at that time.    This patient has consented to a telehealth visit via telephone. I spent 15 minutes in total including but not limited to the direct conversation with this patient. All vitals recorded within this visit are reported by the patient.         Your medication list           Accurate as of March 27, 2020 11:23 AM. If you have any questions, ask your nurse or doctor.               START taking these medications      Instructions Last Dose Given Next Dose Due   ranolazine 500 MG 12 hr tablet  Commonly known as:  Ranexa  Started by:  RONEL Campbell      Take 1 tablet by mouth 2 (Two) Times a Day.          CONTINUE taking these medications      Instructions Last Dose Given Next Dose Due   acetaminophen 325 MG tablet  Commonly known as:  TYLENOL      Take 325 mg by mouth 2 (two) times a day as needed for mild pain (1-3).       atorvastatin 40 MG tablet  Commonly known as:  LIPITOR      Take 0.5 tablets by mouth daily.       carbidopa-levodopa  MG per tablet  Commonly known as:  SINEMET             carvedilol 3.125 MG tablet  Commonly known as:  COREG      Take 1 tablet by mouth Every 12 (Twelve) Hours.       clopidogrel 75 MG tablet  Commonly known as:  PLAVIX      Take 1 tablet by mouth Daily.       ferrous sulfate 325 (65 FE) MG tablet      Take 325 mg by mouth. Twice  a week       fluticasone 50 MCG/ACT nasal spray  Commonly known as:  FLONASE      2 sprays into each nostril daily.       furosemide 40 MG tablet  Commonly known as:  LASIX      TAKE 1 TABLET BY MOUTH EVERY DAY       loratadine 10 MG  tablet  Commonly known as:  CLARITIN      Take 1 tablet by mouth daily.       mupirocin 2 % ointment  Commonly known as:  Bactroban      Apply to affected area with each dressing change.       pyridoxine 200 MG tablet  Commonly known as:  VITAMIN B-6      Take 1 tablet by mouth daily.       saccharomyces boulardii 250 MG capsule  Commonly known as:  FLORASTOR      Take 1 capsule by mouth 2 (Two) Times a Day.       spironolactone 25 MG tablet  Commonly known as:  ALDACTONE      TAKE 1 TABLET BY MOUTH EVERY DAY       temazepam 7.5 MG capsule  Commonly known as:  RESTORIL      15 mg.       umeclidinium-vilanterol 62.5-25 MCG/INH aerosol powder  inhaler  Commonly known as:  ANORO ELLIPTA      Inhale 1 puff Daily.       Ventolin  (90 Base) MCG/ACT inhaler  Generic drug:  albuterol sulfate HFA      INHALE 2 PUFFS BY MOUTH EVERY 4 HOURS AS NEEDED       vitamin D3 125 MCG (5000 UT) capsule capsule      Take 5,000 Units by mouth As Needed.       warfarin 5 MG tablet  Commonly known as:  COUMADIN      Atrial fibrillation  Indications: Atrial Fibrillation       warfarin 2.5 MG tablet  Commonly known as:  COUMADIN      Atrial fibrillation  Indications: Atrial Fibrillation          STOP taking these medications    isosorbide mononitrate 30 MG 24 hr tablet  Commonly known as:  IMDUR  Stopped by:  RONEL Campbell              Where to Get Your Medications      These medications were sent to [facility]/pharmacy #[id number] - [place], [place] - [address] [place] [address] - [phone]  - [phone] FX  [address] [place] [address]    Phone:  [phone]   · ranolazine 500 MG 12 hr tablet           As always, it has been a pleasure to participate in your patient's care.      Sincerely,     Munira RODNEY

## 2020-03-27 NOTE — OUTREACH NOTE
AMI Week 2 Survey      Responses   [facility] patient discharged from?  [place]   Does the patient have one of the following disease processes/diagnoses(primary or secondary)?  Acute MI (STEMI,NSTEMI)   Week 2 attempt successful?  Yes   Call start time  0947   Call end time  0952   Discharge diagnosis  Acute MI,   heart cath and stent   Is patient permission given to speak with other caregiver?  Yes   List who call center can speak with  dtr   Meds reviewed with patient/caregiver?  Yes   Is the patient having any side effects they believe may be caused by any medication additions or changes?  Yes   Side effects comments   Headache   Does the patient have all prescriptions related to this admission filled (includes statins,anticoagulants,HTN meds,anti-arrhythmia meds)  Yes   Is the patient taking all medications as directed (includes completed medication regime)?  Yes   Does the patient have a primary care provider?   Yes   Does the patient have an appointment with their PCP,cardiologist,or clinic within 7 days of discharge?  Yes   Has the patient kept scheduled appointments due by today?  N/A   What is the Home health agency?   [facility]    Has home health visited the patient within 72 hours of discharge?  Yes   Psychosocial issues?  No   Comments  Monitoring bp 112/? per pt. Pt c/o terrible HA daily, gets HA after morning meds then by end of day eases off, will discuss today at appt. LHC- right wrist healed well.    What is the patient's perception of their health status since discharge?  Improving   Is the patient/caregiver able to teach back signs and symptoms of when to call for help immediately:  Sudden discomfort in arms, back, neck or jaw, Sudden chest discomfort, Sudden sweating or clammy skin, Dizziness or lightheadedness   Is the patient/caregiver able to teach back lifestyle changes to help prevent MIs  Regular exercise as approved by provider, Maintaining a healthy weight   Is the patient/caregiver  able to teach back ways to prevent a second heart attack:  Follow up with MD, Take medications   Is the patient/caregiver able to teach back the hierarchy of who to call/visit for symptoms/problems? PCP, Specialist, Home health nurse, Urgent Care, ED, 911  Yes   Week 2 call completed?  Yes          Elaine Roberts RN

## 2020-04-02 ENCOUNTER — READMISSION MANAGEMENT (OUTPATIENT)
Dept: CALL CENTER | Facility: HOSPITAL | Age: 85
End: 2020-04-02

## 2020-04-02 NOTE — OUTREACH NOTE
AMI Week 3 Survey      Responses   [facility] patient discharged from?  [place]   Does the patient have one of the following disease processes/diagnoses(primary or secondary)?  Acute MI (STEMI,NSTEMI)   Week 3 attempt successful?  Yes   Call start time  1236   Call end time  1250   Discharge diagnosis  Acute MI,   heart cath and stent   Is patient permission given to speak with other caregiver?  Yes   Meds reviewed with patient/caregiver?  Yes   Is the patient having any side effects they believe may be caused by any medication additions or changes?  Yes   Side effects comments   constipation   Does the patient have all prescriptions related to this admission filled (includes statins,anticoagulants,HTN meds,anti-arrhythmia meds)  Yes   Is the patient taking all medications as directed (includes completed medication regime)?  Yes   Medication comments  Patient reports recent medication change with telemedicine appt with cardiology nurse practitioner. Patient now on ranexa instead of isosorbide.    Does the patient have a primary care provider?   Yes   Has the patient kept scheduled appointments due by today?  N/A   Comments  Patient in contact with her physicians by phone.    What is the Home health agency?   [facility]    Psychosocial issues?  No   Did the patient receive a copy of their discharge instructions?  Yes   What is the patient's perception of their health status since discharge?  Improving   Is the patient/caregiver able to teach back signs and symptoms of when to call for help immediately:  Sudden chest discomfort   Is the patient/caregiver able to teach back ways to prevent a second heart attack:  Take medications, Follow up with MD   Is the patient/caregiver able to teach back the hierarchy of who to call/visit for symptoms/problems? PCP, Specialist, Home health nurse, Urgent Care, ED, 911  Yes   Additional teach back comments  Reviewed with patient need for adequate fluids, activity for  constipation. Patient has been eating fiber rich foods as well. Patient to take laxative tomorrow if no results today.    Week 3 call completed?  Yes          Yajaira Street RN

## 2020-04-08 RX ORDER — SPIRONOLACTONE 25 MG/1
TABLET ORAL
Qty: 30 TABLET | Refills: 1 | Status: SHIPPED | OUTPATIENT
Start: 2020-04-08 | End: 2020-05-05

## 2020-04-09 ENCOUNTER — READMISSION MANAGEMENT (OUTPATIENT)
Dept: CALL CENTER | Facility: HOSPITAL | Age: 85
End: 2020-04-09

## 2020-04-09 NOTE — OUTREACH NOTE
AMI Week 4 Survey      Responses   [facility] patient discharged from?  [place]   Does the patient have one of the following disease processes/diagnoses(primary or secondary)?  Acute MI (STEMI,NSTEMI)   Week 4 attempt successful?  Yes   Call start time  1357   Call end time  1406   Discharge diagnosis  Acute MI,   heart cath and stent   Is patient permission given to speak with other caregiver?  Yes   List who call center can speak with  dtr   Meds reviewed with patient/caregiver?  Yes   Is the patient having any side effects they believe may be caused by any medication additions or changes?  Yes   Side effects comments   constipation. Patient reports that she still has issue with constipation, but some results today.    Is the patient taking all medications as directed (includes completed medication regime)?  Yes   Has the patient kept scheduled appointments due by today?  Yes   Comments  Patient in contact with her physicians by phone.    Is the patient still receiving Home Health Services?  N/A   Psychosocial issues?  No   What is the patient's perception of their health status since discharge?  Improving   Nursing interventions  Nurse provided patient education   Is the patient/caregiver able to teach back signs and symptoms of when to call for help immediately:  Sudden discomfort in arms, back, neck or jaw, Sudden chest discomfort, Shortness of breath at any time, Nausea or vomiting, Sudden sweating or clammy skin, Irregular or rapid heart rate, Dizziness or lightheadedness   Nursing interventions  Nurse provided patient education   Is the patient/caregiver able to teach back ways to prevent a second heart attack:  Take medications, Follow up with MD   Week 4 call completed?  Yes   Would the patient like one additional call?  No   Graduated  Yes   Did the patient feel the follow up calls were helpful during their recovery period?  Yes   Was the number of calls appropriate?  Yes          Yajaira Street,  RN

## 2020-04-13 ENCOUNTER — TELEMEDICINE (OUTPATIENT)
Dept: CARDIOLOGY | Facility: CLINIC | Age: 85
End: 2020-04-13

## 2020-04-13 VITALS
HEART RATE: 82 BPM | BODY MASS INDEX: 29.64 KG/M2 | HEIGHT: 61 IN | SYSTOLIC BLOOD PRESSURE: 136 MMHG | WEIGHT: 157 LBS | DIASTOLIC BLOOD PRESSURE: 81 MMHG

## 2020-04-13 DIAGNOSIS — I10 ESSENTIAL HYPERTENSION: ICD-10-CM

## 2020-04-13 DIAGNOSIS — Z95.820 S/P ANGIOPLASTY WITH STENT: ICD-10-CM

## 2020-04-13 DIAGNOSIS — I42.0 CARDIOMYOPATHY, DILATED (HCC): ICD-10-CM

## 2020-04-13 DIAGNOSIS — I25.10 CORONARY ARTERY DISEASE INVOLVING NATIVE CORONARY ARTERY OF NATIVE HEART WITHOUT ANGINA PECTORIS: ICD-10-CM

## 2020-04-13 DIAGNOSIS — Z95.0 CARDIAC PACEMAKER IN SITU: Primary | ICD-10-CM

## 2020-04-13 DIAGNOSIS — K30 INDIGESTION: ICD-10-CM

## 2020-04-13 DIAGNOSIS — E78.00 PURE HYPERCHOLESTEROLEMIA: ICD-10-CM

## 2020-04-13 PROCEDURE — 99442 PR PHYS/QHP TELEPHONE EVALUATION 11-20 MIN: CPT | Performed by: NURSE PRACTITIONER

## 2020-04-13 RX ORDER — LISINOPRIL 2.5 MG/1
2.5 TABLET ORAL DAILY
Qty: 90 TABLET | Refills: 3 | Status: ON HOLD | OUTPATIENT
Start: 2020-04-13 | End: 2020-07-09

## 2020-04-13 RX ORDER — FAMOTIDINE 20 MG/1
20 TABLET, FILM COATED ORAL 2 TIMES DAILY
Qty: 60 TABLET | Refills: 5 | Status: SHIPPED | OUTPATIENT
Start: 2020-04-13 | End: 2020-07-10 | Stop reason: HOSPADM

## 2020-04-13 NOTE — PROGRESS NOTES
Date of Office Visit: 2020  Encounter Provider: RONEL Campbell  Place of Service: [facility] CARDIOLOGY  Patient Name: Vicky Stewart  :10/18/1933    Chief Complaint   Patient presents with   • Atrial Fibrillation   :     HPI:Fermin Stewart is a 86 year old female who is following up with me to day for a 2-week telehealth visit. She is a primary patient of Dr. Root. I spoke with her on  for a 1 week hospital follow up of coronary disease and ischemic cardiomyopathy.  She has a history of permanent A. fib sick sinus syndrome as well as third-degree AV block and has a permanent pacemaker.  On  she was admitted to the hospital with chest pain and extensive inferior and anterior lateral MI.  She was found to have occlusion on cardiac cath in the right coronary and underwent angioplasty and drug-eluting stent.  She was also found to have severe distal left main disease with involvement of the origin of the LAD and circumflex given her age it was felt that she was not a surgical candidate for revascularization and she was placed on medical therapy.  Her EF was low at 35%.  Her blood pressure was also low and she was not started on an ACE inhibitor.  She did however get started on low-dose beta-blocker and Aldactone.  When I spoke with her on the  she had been having a headache from the isosorbide that she had been started on and it was stopped and switched to Ranexa.  I am following up with her today to see if her blood pressure can tolerate starting some low-dose ACE inhibitor and making sure the Ranexa is working for her.      Patient angina has been better.  She is not having any chest pain or pressure.  However she has been having a lot of indigestion and was taken off omeprazole because of the interaction with Plavix.  She started taking Pepcid AC once a day.  Ever she is still getting a lot of indigestion.  She also started having some  constipation with the addition of Ranexa but is taking Colace and this seems to have helped.      Past Medical History:   Diagnosis Date   • Acute MI, inferior wall (CMS/HCC) 03/2020   • Anemia    • Atrial fibrillation (CMS/[facility])    • Atrial flutter (CMS/[facility])    • AV block, 3rd degree (CMS/[facility])    • Carotid artery stenosis    • Dermatitis    • GERD (gastroesophageal reflux disease)    • History of EKG 06/17/2016   • Hyperlipidemia    • Hypertension    • Low back pain    • Osteoarthritis     knee   • Osteoporosis    • Pacemaker    • Palpitations    • Raynaud's phenomenon    • SOB (shortness of breath) on exertion    • SSS (sick sinus syndrome) (CMS/[facility])    • URI, acute    • Venous insufficiency        Past Surgical History:   Procedure Laterality Date   • BILE DUCT STENT PLACEMENT      choledochal   • BREAST BIOPSY Left     benign   • CARDIAC CATHETERIZATION N/A 3/14/2020    Procedure: Coronary angiography;  Surgeon: Melquiades Root MD;  Location: [facility] CATH INVASIVE LOCATION;  Service: Cardiovascular;  Laterality: N/A;   • CARDIAC CATHETERIZATION  3/14/2020    Procedure: Percutaneous Manual Thrombectomy;  Surgeon: Melquiades Root MD;  Location: [facility] CATH INVASIVE LOCATION;  Service: Cardiovascular;;   • CARDIAC CATHETERIZATION N/A 3/14/2020    Procedure: Stent CAROLYNN coronary;  Surgeon: Melquiades Root MD;  Location: [facility] CATH INVASIVE LOCATION;  Service: Cardiovascular;  Laterality: N/A;   • CARDIAC ELECTROPHYSIOLOGY PROCEDURE N/A 9/14/2016    Procedure: Lead Revision PPM BOSTON;  Surgeon: Kale Méndez MD;  Location: [facility] CATH INVASIVE LOCATION;  Service:    • CATARACT EXTRACTION W/ INTRAOCULAR LENS  IMPLANT, BILATERAL Bilateral    • CHOLECYSTECTOMY     • COLONOSCOPY  09/2012   • FACIAL COSMETIC SURGERY     • OTHER SURGICAL HISTORY      interrogation of implantable loop recorder remote, up to 30 days   • PACEMAKER IMPLANTATION     • SINUS SURGERY         Social History     Socioeconomic History   • Marital  status:      Spouse name: Not on file   • Number of children: Not on file   • Years of education: Not on file   • Highest education level: Not on file   Occupational History   • Occupation: retired   Tobacco Use   • Smoking status: Former Smoker     Packs/day: 1.50     Start date:      Last attempt to quit: 2011     Years since quittin.2   • Smokeless tobacco: Never Used   • Tobacco comment: SMOKED 1 1/2 PACKS A DAY FOR 45YEARS  QUIT SMOKING 5 YEARS AGO   Substance and Sexual Activity   • Alcohol use: No   • Drug use: No   • Sexual activity: Defer     Comment:        Family History   Problem Relation Age of Onset   • Heart disease Mother    • Stroke Mother    • Heart disease Father    • Diabetes Sister    • Hodgkin's lymphoma Sister    • Heart disease Other    • Stroke Other    • Endometrial cancer Sister        Review of Systems   Constitution: Negative for diaphoresis and malaise/fatigue.   Cardiovascular: Negative for chest pain, claudication, dyspnea on exertion, irregular heartbeat, leg swelling, near-syncope, orthopnea, palpitations, paroxysmal nocturnal dyspnea and syncope.   Respiratory: Negative for cough, shortness of breath and sleep disturbances due to breathing.    Musculoskeletal: Negative for falls.   Gastrointestinal: Positive for heartburn.   Neurological: Negative for dizziness and weakness.   Psychiatric/Behavioral: Negative for altered mental status and substance abuse.       Allergies   Allergen Reactions   • Codeine Nausea And Vomiting   • Penicillins Swelling   • Sulfa Antibiotics Nausea And Vomiting   • Tramadol Nausea And Vomiting         Current Outpatient Medications:   •  acetaminophen (TYLENOL) 325 MG tablet, Take 325 mg by mouth 2 (two) times a day as needed for mild pain (1-3)., Disp: , Rfl:   •  atorvastatin (LIPITOR) 40 MG tablet, Take 0.5 tablets by mouth daily., Disp: , Rfl:   •  carbidopa-levodopa (SINEMET)  MG per tablet, , Disp: , Rfl: 11  •   "carvedilol (COREG) 3.125 MG tablet, Take 1 tablet by mouth Every 12 (Twelve) Hours., Disp: 60 tablet, Rfl: 6  •  clopidogrel (PLAVIX) 75 MG tablet, Take 1 tablet by mouth Daily., Disp: 30 tablet, Rfl: 11  •  fluticasone (FLONASE) 50 MCG/ACT nasal spray, 2 sprays into each nostril daily., Disp: , Rfl:   •  furosemide (LASIX) 40 MG tablet, TAKE 1 TABLET BY MOUTH EVERY DAY, Disp: 90 tablet, Rfl: 1  •  loratadine (CLARITIN) 10 MG tablet, Take 1 tablet by mouth daily., Disp: , Rfl:   •  pyridoxine (B-6) 200 MG tablet, Take 1 tablet by mouth daily., Disp: , Rfl:   •  ranolazine (Ranexa) 500 MG 12 hr tablet, Take 1 tablet by mouth 2 (Two) Times a Day., Disp: 60 tablet, Rfl: 11  •  saccharomyces boulardii (FLORASTOR) 250 MG capsule, Take 1 capsule by mouth 2 (Two) Times a Day., Disp: , Rfl:   •  spironolactone (ALDACTONE) 25 MG tablet, TAKE 1 TABLET BY MOUTH EVERY DAY, Disp: 30 tablet, Rfl: 1  •  temazepam (RESTORIL) 7.5 MG capsule, 15 mg., Disp: , Rfl: 1  •  Umeclidinium-Vilanterol 62.5-25 MCG/INH aerosol powder , Inhale 1 puff Daily., Disp: , Rfl:   •  VENTOLIN  (90 BASE) MCG/ACT inhaler, INHALE 2 PUFFS BY MOUTH EVERY 4 HOURS AS NEEDED, Disp: , Rfl: 4  •  warfarin (COUMADIN) 2.5 MG tablet, Atrial fibrillation  Indications: Atrial Fibrillation, Disp: 30 tablet, Rfl: 3  •  warfarin (COUMADIN) 5 MG tablet, Atrial fibrillation  Indications: Atrial Fibrillation, Disp: 30 tablet, Rfl: 3  •  Cholecalciferol (VITAMIN D3) 5000 UNITS capsule capsule, Take 5,000 Units by mouth As Needed., Disp: , Rfl:   •  ferrous sulfate 325 (65 FE) MG tablet, Take 325 mg by mouth. Twice  a week, Disp: , Rfl:   •  mupirocin (BACTROBAN) 2 % ointment, Apply to affected area with each dressing change., Disp: 22 g, Rfl: 0      Objective:     Vitals:    04/13/20 0957   BP: 136/81   Pulse: 82   Weight: 71.2 kg (157 lb)   Height: 154.9 cm (61\")     Body mass index is 29.66 kg/m².    PHYSICAL EXAM:    Physical Exam    Procedures      Assessment:    "    Diagnosis Plan   1. Cardiac pacemaker in situ     2. Essential hypertension     3. Pure hypercholesterolemia     4. S/P angioplasty with stent     5. Coronary artery disease involving native coronary artery of native heart without angina pectoris     6. Cardiomyopathy, dilated (CMS/HCC)       No orders of the defined types were placed in this encounter.         Plan:       I told her to start taking the Pepcid twice a day and will see if this helps her indigestion.  Her blood pressure is stable and good add a low-dose ACE inhibitor, lisinopril 2.5 mg at bedtime.  I told her to check her blood pressure to make sure it is not getting too low but today she is in the 130s over 80s.  She has a follow-up appointment in a couple weeks with Dr. Root.  We tried to do a video visit but could not get the link to work.  Therefore we did a telephone appointment    This patient has consented to a telehealth visit via telephone. The visit was scheduled as a telephone visit to comply with patient safety concerns in accordance with CDC recommendations.  All vitals recorded within this visit are reported by the patient.  I spent 20 minutes in total including but not limited to the 15 minutes spent in direct conversation with this patient.          Your medication list           Accurate as of April 13, 2020 10:20 AM. If you have any questions, ask your nurse or doctor.               CONTINUE taking these medications      Instructions Last Dose Given Next Dose Due   acetaminophen 325 MG tablet  Commonly known as:  TYLENOL      Take 325 mg by mouth 2 (two) times a day as needed for mild pain (1-3).       atorvastatin 40 MG tablet  Commonly known as:  LIPITOR      Take 0.5 tablets by mouth daily.       carbidopa-levodopa  MG per tablet  Commonly known as:  SINEMET             carvedilol 3.125 MG tablet  Commonly known as:  COREG      Take 1 tablet by mouth Every 12 (Twelve) Hours.       clopidogrel 75 MG  tablet  Commonly known as:  PLAVIX      Take 1 tablet by mouth Daily.       ferrous sulfate 325 (65 FE) MG tablet      Take 325 mg by mouth. Twice  a week       fluticasone 50 MCG/ACT nasal spray  Commonly known as:  FLONASE      2 sprays into each nostril daily.       furosemide 40 MG tablet  Commonly known as:  LASIX      TAKE 1 TABLET BY MOUTH EVERY DAY       loratadine 10 MG tablet  Commonly known as:  CLARITIN      Take 1 tablet by mouth daily.       mupirocin 2 % ointment  Commonly known as:  Bactroban      Apply to affected area with each dressing change.       pyridoxine 200 MG tablet  Commonly known as:  VITAMIN B-6      Take 1 tablet by mouth daily.       ranolazine 500 MG 12 hr tablet  Commonly known as:  Ranexa      Take 1 tablet by mouth 2 (Two) Times a Day.       saccharomyces boulardii 250 MG capsule  Commonly known as:  FLORASTOR      Take 1 capsule by mouth 2 (Two) Times a Day.       spironolactone 25 MG tablet  Commonly known as:  ALDACTONE      TAKE 1 TABLET BY MOUTH EVERY DAY       temazepam 7.5 MG capsule  Commonly known as:  RESTORIL      15 mg.       umeclidinium-vilanterol 62.5-25 MCG/INH aerosol powder  inhaler  Commonly known as:  ANORO ELLIPTA      Inhale 1 puff Daily.       Ventolin  (90 Base) MCG/ACT inhaler  Generic drug:  albuterol sulfate HFA      INHALE 2 PUFFS BY MOUTH EVERY 4 HOURS AS NEEDED       vitamin D3 125 MCG (5000 UT) capsule capsule      Take 5,000 Units by mouth As Needed.       warfarin 5 MG tablet  Commonly known as:  COUMADIN      Atrial fibrillation  Indications: Atrial Fibrillation       warfarin 2.5 MG tablet  Commonly known as:  COUMADIN      Atrial fibrillation  Indications: Atrial Fibrillation                As always, it has been a pleasure to participate in your patient's care.      Sincerely,     Munira RODNEY

## 2020-04-14 ENCOUNTER — TELEPHONE (OUTPATIENT)
Dept: CARDIOLOGY | Facility: CLINIC | Age: 85
End: 2020-04-14

## 2020-04-14 NOTE — TELEPHONE ENCOUNTER
Pt stated she took her lisinopril, she was nauseous and very dizzy, she said she is not taking the medication anymore. Please advise.    Lisa LEAVITT, KARAN

## 2020-04-15 NOTE — TELEPHONE ENCOUNTER
How long after she took it did she feel that way? Also did she check her blood pressure? Was it low?

## 2020-04-21 ENCOUNTER — TELEMEDICINE (OUTPATIENT)
Dept: CARDIOLOGY | Facility: CLINIC | Age: 85
End: 2020-04-21

## 2020-04-21 VITALS
WEIGHT: 157 LBS | DIASTOLIC BLOOD PRESSURE: 80 MMHG | HEART RATE: 80 BPM | HEIGHT: 62 IN | BODY MASS INDEX: 28.89 KG/M2 | SYSTOLIC BLOOD PRESSURE: 120 MMHG

## 2020-04-21 DIAGNOSIS — Z95.0 CARDIAC PACEMAKER IN SITU: ICD-10-CM

## 2020-04-21 DIAGNOSIS — I21.19 ACUTE MI, INFERIOR WALL (HCC): ICD-10-CM

## 2020-04-21 DIAGNOSIS — I48.21 PERMANENT ATRIAL FIBRILLATION (HCC): Primary | ICD-10-CM

## 2020-04-21 DIAGNOSIS — I42.0 CARDIOMYOPATHY, DILATED (HCC): ICD-10-CM

## 2020-04-21 PROCEDURE — 99213 OFFICE O/P EST LOW 20 MIN: CPT | Performed by: INTERNAL MEDICINE

## 2020-04-21 RX ORDER — NITROGLYCERIN 0.4 MG/1
TABLET SUBLINGUAL
Qty: 100 TABLET | Refills: 11 | Status: SHIPPED | OUTPATIENT
Start: 2020-04-21 | End: 2021-10-20

## 2020-04-21 NOTE — PROGRESS NOTES
She is an 86-year-old patient who had inferolateral myocardial infarction in March of this year.  At that time we stopped her STEMI and placed a 3.5 x 15 mm length and 3.5x 28 Xience eluding dental problem mid RCA.  She also has significant distal left main lesion some proximal LAD disease.  She is not really a candidate for surgical intervention.    So that time manometrically should get along okay I do not think really having any angina.  She does complain of heartburn and it does seem likely is reflux and heartburn previously had been on omeprazole but now that she can Plavix is not on that and she is on Pepcid.  She not had any PND orthopnea edema.  She also is having some constipation which is definitely new.  She also has raynauds and is noticed that her hand a little bit more cold.  Her carvedilol is a new medicine for her.    This point her cardiac status seems to be pretty stable. Her blood pressure is good and symptom wise she is good going to stop the Ranexa and he has he does and it helps the constipations.  I will write for some as needed nitroglycerin.  I have reviewed the Film again today and we could do a complete bifurcation stenting of the left main and LAD if needed.  I want to hear by me office in June with an echo.    This was a video visit that she consented to ice 20 minutes on the charting

## 2020-05-01 ENCOUNTER — CLINICAL SUPPORT NO REQUIREMENTS (OUTPATIENT)
Dept: CARDIOLOGY | Facility: CLINIC | Age: 85
End: 2020-05-01

## 2020-05-01 DIAGNOSIS — I44.2 COMPLETE HEART BLOCK (HCC): Primary | ICD-10-CM

## 2020-05-01 PROCEDURE — 93294 REM INTERROG EVL PM/LDLS PM: CPT | Performed by: INTERNAL MEDICINE

## 2020-05-01 PROCEDURE — 93296 REM INTERROG EVL PM/IDS: CPT | Performed by: INTERNAL MEDICINE

## 2020-05-05 RX ORDER — SPIRONOLACTONE 25 MG/1
TABLET ORAL
Qty: 30 TABLET | Refills: 1 | Status: SHIPPED | OUTPATIENT
Start: 2020-05-05 | End: 2020-06-12

## 2020-06-12 RX ORDER — SPIRONOLACTONE 25 MG/1
TABLET ORAL
Qty: 90 TABLET | Refills: 1 | Status: SHIPPED | OUTPATIENT
Start: 2020-06-12 | End: 2021-01-11

## 2020-06-15 ENCOUNTER — OFFICE VISIT (OUTPATIENT)
Dept: CARDIAC REHAB | Facility: HOSPITAL | Age: 85
End: 2020-06-15

## 2020-06-15 DIAGNOSIS — I21.3 ST ELEVATION MYOCARDIAL INFARCTION (STEMI), UNSPECIFIED ARTERY (HCC): Primary | ICD-10-CM

## 2020-06-15 PROCEDURE — 93797 PHYS/QHP OP CAR RHAB WO ECG: CPT

## 2020-06-17 ENCOUNTER — OFFICE VISIT (OUTPATIENT)
Dept: CARDIOLOGY | Facility: CLINIC | Age: 85
End: 2020-06-17

## 2020-06-17 ENCOUNTER — HOSPITAL ENCOUNTER (OUTPATIENT)
Dept: CARDIOLOGY | Facility: HOSPITAL | Age: 85
Discharge: HOME | End: 2020-06-17
Admitting: INTERNAL MEDICINE

## 2020-06-17 VITALS
SYSTOLIC BLOOD PRESSURE: 130 MMHG | OXYGEN SATURATION: 98 % | WEIGHT: 157 LBS | DIASTOLIC BLOOD PRESSURE: 80 MMHG | BODY MASS INDEX: 29.64 KG/M2 | HEART RATE: 83 BPM | HEIGHT: 61 IN

## 2020-06-17 VITALS
WEIGHT: 160 LBS | OXYGEN SATURATION: 93 % | SYSTOLIC BLOOD PRESSURE: 138 MMHG | HEIGHT: 61 IN | RESPIRATION RATE: 16 BRPM | BODY MASS INDEX: 30.21 KG/M2 | DIASTOLIC BLOOD PRESSURE: 86 MMHG | HEART RATE: 77 BPM

## 2020-06-17 DIAGNOSIS — I42.0 CARDIOMYOPATHY, DILATED (HCC): ICD-10-CM

## 2020-06-17 DIAGNOSIS — I25.10 CORONARY ARTERY DISEASE INVOLVING NATIVE CORONARY ARTERY OF NATIVE HEART WITHOUT ANGINA PECTORIS: ICD-10-CM

## 2020-06-17 DIAGNOSIS — I49.5 SICK SINUS SYNDROME (HCC): ICD-10-CM

## 2020-06-17 DIAGNOSIS — Z95.0 CARDIAC PACEMAKER IN SITU: Primary | ICD-10-CM

## 2020-06-17 DIAGNOSIS — I48.21 PERMANENT ATRIAL FIBRILLATION (HCC): ICD-10-CM

## 2020-06-17 LAB
AORTIC ARCH: 2.5 CM
ASCENDING AORTA: 3.6 CM
BH CV ECHO MEAS - ACS: 1.4 CM
BH CV ECHO MEAS - AO MAX PG (FULL): 6.4 MMHG
BH CV ECHO MEAS - AO MAX PG: 8.6 MMHG
BH CV ECHO MEAS - AO MEAN PG (FULL): 4 MMHG
BH CV ECHO MEAS - AO MEAN PG: 5.2 MMHG
BH CV ECHO MEAS - AO ROOT AREA (BSA CORRECTED): 1.7
BH CV ECHO MEAS - AO ROOT AREA: 6.6 CM^2
BH CV ECHO MEAS - AO ROOT DIAM: 2.9 CM
BH CV ECHO MEAS - AO V2 MAX: 146.3 CM/SEC
BH CV ECHO MEAS - AO V2 MEAN: 109.5 CM/SEC
BH CV ECHO MEAS - AO V2 VTI: 31.2 CM
BH CV ECHO MEAS - ASC AORTA: 3.6 CM
BH CV ECHO MEAS - AVA(I,A): 1 CM^2
BH CV ECHO MEAS - AVA(I,D): 1 CM^2
BH CV ECHO MEAS - AVA(V,A): 1.2 CM^2
BH CV ECHO MEAS - AVA(V,D): 1.2 CM^2
BH CV ECHO MEAS - BSA(HAYCOCK): 1.8 M^2
BH CV ECHO MEAS - BSA: 1.7 M^2
BH CV ECHO MEAS - BZI_BMI: 29.7 KILOGRAMS/M^2
BH CV ECHO MEAS - BZI_METRIC_HEIGHT: 154.9 CM
BH CV ECHO MEAS - BZI_METRIC_WEIGHT: 71.2 KG
BH CV ECHO MEAS - EDV(MOD-SP2): 96 ML
BH CV ECHO MEAS - EDV(MOD-SP4): 77 ML
BH CV ECHO MEAS - EDV(TEICH): 82.5 ML
BH CV ECHO MEAS - EF(CUBED): 40.3 %
BH CV ECHO MEAS - EF(MOD-BP): 43.3 %
BH CV ECHO MEAS - EF(MOD-SP2): 42.7 %
BH CV ECHO MEAS - EF(MOD-SP4): 42.9 %
BH CV ECHO MEAS - EF(TEICH): 33.6 %
BH CV ECHO MEAS - ESV(MOD-SP2): 55 ML
BH CV ECHO MEAS - ESV(MOD-SP4): 44 ML
BH CV ECHO MEAS - ESV(TEICH): 54.8 ML
BH CV ECHO MEAS - FS: 15.8 %
BH CV ECHO MEAS - IVS/LVPW: 0.96
BH CV ECHO MEAS - IVSD: 1.1 CM
BH CV ECHO MEAS - LV DIASTOLIC VOL/BSA (35-75): 45.2 ML/M^2
BH CV ECHO MEAS - LV MASS(C)D: 161.5 GRAMS
BH CV ECHO MEAS - LV MASS(C)DI: 94.7 GRAMS/M^2
BH CV ECHO MEAS - LV MAX PG: 2.1 MMHG
BH CV ECHO MEAS - LV MEAN PG: 1.2 MMHG
BH CV ECHO MEAS - LV SYSTOLIC VOL/BSA (12-30): 25.8 ML/M^2
BH CV ECHO MEAS - LV V1 MAX: 73.1 CM/SEC
BH CV ECHO MEAS - LV V1 MEAN: 53.5 CM/SEC
BH CV ECHO MEAS - LV V1 VTI: 12.9 CM
BH CV ECHO MEAS - LVIDD: 4.3 CM
BH CV ECHO MEAS - LVIDS: 3.6 CM
BH CV ECHO MEAS - LVLD AP2: 6.5 CM
BH CV ECHO MEAS - LVLD AP4: 6.3 CM
BH CV ECHO MEAS - LVLS AP2: 5.3 CM
BH CV ECHO MEAS - LVLS AP4: 5.4 CM
BH CV ECHO MEAS - LVOT AREA (M): 2.5 CM^2
BH CV ECHO MEAS - LVOT AREA: 2.5 CM^2
BH CV ECHO MEAS - LVOT DIAM: 1.8 CM
BH CV ECHO MEAS - LVPWD: 1.1 CM
BH CV ECHO MEAS - MR MAX PG: 23.9 MMHG
BH CV ECHO MEAS - MR MAX VEL: 244.3 CM/SEC
BH CV ECHO MEAS - MV DEC SLOPE: 598.5 CM/SEC^2
BH CV ECHO MEAS - MV DEC TIME: 0.19 SEC
BH CV ECHO MEAS - MV E MAX VEL: 144 CM/SEC
BH CV ECHO MEAS - MV MAX PG: 9.1 MMHG
BH CV ECHO MEAS - MV MEAN PG: 4.3 MMHG
BH CV ECHO MEAS - MV P1/2T MAX VEL: 148.1 CM/SEC
BH CV ECHO MEAS - MV P1/2T: 72.5 MSEC
BH CV ECHO MEAS - MV V2 MAX: 150.5 CM/SEC
BH CV ECHO MEAS - MV V2 MEAN: 97.5 CM/SEC
BH CV ECHO MEAS - MV V2 VTI: 28.3 CM
BH CV ECHO MEAS - MVA P1/2T LCG: 1.5 CM^2
BH CV ECHO MEAS - MVA(P1/2T): 3 CM^2
BH CV ECHO MEAS - MVA(VTI): 1.1 CM^2
BH CV ECHO MEAS - PA ACC TIME: 0.06 SEC
BH CV ECHO MEAS - PA MAX PG (FULL): 1 MMHG
BH CV ECHO MEAS - PA MAX PG: 2.2 MMHG
BH CV ECHO MEAS - PA PR(ACCEL): 50.5 MMHG
BH CV ECHO MEAS - PA V2 MAX: 74.8 CM/SEC
BH CV ECHO MEAS - PULM DIAS VEL: 45.8 CM/SEC
BH CV ECHO MEAS - PULM S/D: 0.71
BH CV ECHO MEAS - PULM SYS VEL: 32.6 CM/SEC
BH CV ECHO MEAS - PVA(V,A): 2.6 CM^2
BH CV ECHO MEAS - PVA(V,D): 2.6 CM^2
BH CV ECHO MEAS - QP/QS: 0.73
BH CV ECHO MEAS - RAP SYSTOLE: 3 MMHG
BH CV ECHO MEAS - RV BASE (<4.1) - OBSOLETE: 3.1 CM
BH CV ECHO MEAS - RV LENGTH (<8.5) - OBSOLETE: 6.4 CM
BH CV ECHO MEAS - RV MAX PG: 1.2 MMHG
BH CV ECHO MEAS - RV MEAN PG: 0.64 MMHG
BH CV ECHO MEAS - RV V1 MAX: 55.3 CM/SEC
BH CV ECHO MEAS - RV V1 MEAN: 36.9 CM/SEC
BH CV ECHO MEAS - RV V1 VTI: 6.6 CM
BH CV ECHO MEAS - RVOT AREA: 3.5 CM^2
BH CV ECHO MEAS - RVOT DIAM: 2.1 CM
BH CV ECHO MEAS - RVSP: 31 MMHG
BH CV ECHO MEAS - SI(AO): 121.4 ML/M^2
BH CV ECHO MEAS - SI(CUBED): 18.6 ML/M^2
BH CV ECHO MEAS - SI(LVOT): 18.7 ML/M^2
BH CV ECHO MEAS - SI(MOD-SP2): 24.1 ML/M^2
BH CV ECHO MEAS - SI(MOD-SP4): 19.4 ML/M^2
BH CV ECHO MEAS - SI(TEICH): 16.3 ML/M^2
BH CV ECHO MEAS - SUP REN AO DIAM: 2.3 CM
BH CV ECHO MEAS - SV(AO): 206.9 ML
BH CV ECHO MEAS - SV(CUBED): 31.8 ML
BH CV ECHO MEAS - SV(LVOT): 31.8 ML
BH CV ECHO MEAS - SV(MOD-SP2): 41 ML
BH CV ECHO MEAS - SV(MOD-SP4): 33 ML
BH CV ECHO MEAS - SV(RVOT): 23.2 ML
BH CV ECHO MEAS - SV(TEICH): 27.7 ML
BH CV ECHO MEAS - TAPSE (>1.6): 1.6 CM2
BH CV ECHO MEAS - TR MAX VEL: 264.1 CM/SEC
BH CV VAS BP RIGHT ARM: NORMAL MMHG
BH CV XLRA - RV BASE: 3.1 CM
BH CV XLRA - RV LENGTH: 6.4 CM
BH CV XLRA - RV MID: 3 CM
BH CV XLRA - TDI S': 9 CM/SEC
LEFT ATRIUM VOLUME INDEX: 53 ML/M2
SINUS: 3 CM
STJ: 3.1 CM

## 2020-06-17 PROCEDURE — 93306 TTE W/DOPPLER COMPLETE: CPT

## 2020-06-17 PROCEDURE — 93306 TTE W/DOPPLER COMPLETE: CPT | Performed by: INTERNAL MEDICINE

## 2020-06-17 PROCEDURE — 93000 ELECTROCARDIOGRAM COMPLETE: CPT | Performed by: INTERNAL MEDICINE

## 2020-06-17 PROCEDURE — 99214 OFFICE O/P EST MOD 30 MIN: CPT | Performed by: INTERNAL MEDICINE

## 2020-06-17 PROCEDURE — 25010000002 PERFLUTREN (DEFINITY) 8.476 MG IN SODIUM CHLORIDE (PF) 0.9 % 10 ML INJECTION: Performed by: INTERNAL MEDICINE

## 2020-06-17 RX ADMIN — PERFLUTREN 1.5 ML: 6.52 INJECTION, SUSPENSION INTRAVENOUS at 10:38

## 2020-06-17 NOTE — PROGRESS NOTES
Date of Office Visit: 20  Encounter Provider: Melquiades Root MD  Place of Service: [facility] CARDIOLOGY  Patient Name: Vicky Stewart  :10/18/1933  [id number]    Chief Complaint   Patient presents with   • Atrial Fibrillation   :     HPI: Vicky Stewart is a 86 y.o. female  who had inferolateral myocardial infarction in 2020.  At that time we stopped her STEMI and placed a 3.5 x 15 mm length and 3.5x 28 Xience eluding dental problem mid RCA.  She also has significant distal left main lesion some proximal LAD disease.  She is not really a candidate for surgical intervention.  We have been managing her medically she developed constipation with Ranexa and terrible headaches with isosorbide.  She has been doing well she has an occasional sensation of discomfort in her chest not much she has not had to take any nitroglycerin her breathing is pretty good she is not having PND orthopnea edema she complains about her raynauds being worse    Past Medical History:   Diagnosis Date   • Acute MI, inferior wall (CMS/HCC) 2020   • Anemia    • Atrial fibrillation (CMS/HCC)    • Atrial flutter (CMS/HCC)    • AV block, 3rd degree (CMS/HCC)    • Carotid artery stenosis    • Dermatitis    • GERD (gastroesophageal reflux disease)    • History of EKG 2016   • Hyperlipidemia    • Hypertension    • Low back pain    • Osteoarthritis     knee   • Osteoporosis    • Pacemaker    • Palpitations    • Raynaud's phenomenon    • SOB (shortness of breath) on exertion    • SSS (sick sinus syndrome) (CMS/HCC)    • URI, acute    • Venous insufficiency        Past Surgical History:   Procedure Laterality Date   • BILE DUCT STENT PLACEMENT      choledochal   • BREAST BIOPSY Left     benign   • CARDIAC CATHETERIZATION N/A 3/14/2020    Procedure: Coronary angiography;  Surgeon: Melquiades Root MD;  Location: [facility] CATH INVASIVE LOCATION;  Service: Cardiovascular;  Laterality: N/A;   •  CARDIAC CATHETERIZATION  3/14/2020    Procedure: Percutaneous Manual Thrombectomy;  Surgeon: Melquiades Root MD;  Location:  [facility] CATH INVASIVE LOCATION;  Service: Cardiovascular;;   • CARDIAC CATHETERIZATION N/A 3/14/2020    Procedure: Stent CAROLYNN coronary;  Surgeon: Melquiades Root MD;  Location:  [facility] CATH INVASIVE LOCATION;  Service: Cardiovascular;  Laterality: N/A;   • CARDIAC ELECTROPHYSIOLOGY PROCEDURE N/A 2016    Procedure: Lead Revision PPM BOSTON;  Surgeon: Kale Méndez MD;  Location:  [facility] CATH INVASIVE LOCATION;  Service:    • CATARACT EXTRACTION W/ INTRAOCULAR LENS  IMPLANT, BILATERAL Bilateral    • CHOLECYSTECTOMY     • COLONOSCOPY  2012   • FACIAL COSMETIC SURGERY     • OTHER SURGICAL HISTORY      interrogation of implantable loop recorder remote, up to 30 days   • PACEMAKER IMPLANTATION     • SINUS SURGERY         Social History     Socioeconomic History   • Marital status:      Spouse name: Not on file   • Number of children: Not on file   • Years of education: Not on file   • Highest education level: Not on file   Occupational History   • Occupation: retired   Tobacco Use   • Smoking status: Former Smoker     Packs/day: 1.50     Start date:      Last attempt to quit:      Years since quittin.4   • Smokeless tobacco: Never Used   • Tobacco comment: CAFFEINE USE: 3-4 CUPS DECAF COFFEE DAILY   Substance and Sexual Activity   • Alcohol use: No   • Drug use: No   • Sexual activity: Defer     Comment:        Family History   Problem Relation Age of Onset   • Heart disease Mother    • Stroke Mother    • Heart disease Father    • Diabetes Sister    • Hodgkin's lymphoma Sister    • Heart disease Other    • Stroke Other    • Endometrial cancer Sister        Review of Systems   Constitution: Negative for decreased appetite, fever, malaise/fatigue and weight loss.   HENT: Negative for nosebleeds.    Eyes: Negative for double vision.   Cardiovascular: Negative for chest  pain, claudication, cyanosis, dyspnea on exertion, irregular heartbeat, leg swelling, near-syncope, orthopnea, palpitations, paroxysmal nocturnal dyspnea and syncope.   Respiratory: Negative for cough, hemoptysis and shortness of breath.    Hematologic/Lymphatic: Negative for bleeding problem.   Skin: Negative for rash.   Musculoskeletal: Negative for falls and myalgias.   Gastrointestinal: Negative for hematochezia, jaundice, melena, nausea and vomiting.   Genitourinary: Negative for hematuria.   Neurological: Negative for dizziness and seizures.   Psychiatric/Behavioral: Negative for altered mental status and memory loss.       Allergies   Allergen Reactions   • Codeine Nausea And Vomiting   • Lisinopril Dizziness   • Penicillins Swelling   • Sulfa Antibiotics Nausea And Vomiting   • Tramadol Nausea And Vomiting         Current Outpatient Medications:   •  acetaminophen (TYLENOL) 325 MG tablet, Take 325 mg by mouth 2 (two) times a day as needed for mild pain (1-3)., Disp: , Rfl:   •  atorvastatin (LIPITOR) 40 MG tablet, Take 0.5 tablets by mouth daily., Disp: , Rfl:   •  carbidopa-levodopa (SINEMET)  MG per tablet, , Disp: , Rfl: 11  •  carvedilol (COREG) 3.125 MG tablet, Take 1 tablet by mouth Every 12 (Twelve) Hours., Disp: 60 tablet, Rfl: 6  •  clopidogrel (PLAVIX) 75 MG tablet, Take 1 tablet by mouth Daily., Disp: 30 tablet, Rfl: 11  •  famotidine (Pepcid) 20 MG tablet, Take 1 tablet by mouth 2 (Two) Times a Day., Disp: 60 tablet, Rfl: 5  •  fluticasone (FLONASE) 50 MCG/ACT nasal spray, 2 sprays into each nostril daily., Disp: , Rfl:   •  furosemide (LASIX) 40 MG tablet, TAKE 1 TABLET BY MOUTH EVERY DAY, Disp: 90 tablet, Rfl: 1  •  loratadine (CLARITIN) 10 MG tablet, Take 1 tablet by mouth daily., Disp: , Rfl:   •  mupirocin (BACTROBAN) 2 % ointment, Apply to affected area with each dressing change., Disp: 22 g, Rfl: 0  •  nitroglycerin (NITROSTAT) 0.4 MG SL tablet, 1 under the tongue as needed for  "angina, may repeat q5mins for up three doses, Disp: 100 tablet, Rfl: 11  •  pyridoxine (B-6) 200 MG tablet, Take 1 tablet by mouth daily., Disp: , Rfl:   •  saccharomyces boulardii (FLORASTOR) 250 MG capsule, Take 1 capsule by mouth 2 (Two) Times a Day., Disp: , Rfl:   •  spironolactone (ALDACTONE) 25 MG tablet, TAKE 1 TABLET BY MOUTH EVERY DAY, Disp: 90 tablet, Rfl: 1  •  temazepam (RESTORIL) 7.5 MG capsule, 15 mg., Disp: , Rfl: 1  •  Umeclidinium-Vilanterol 62.5-25 MCG/INH aerosol powder , Inhale 1 puff Daily., Disp: , Rfl:   •  VENTOLIN  (90 BASE) MCG/ACT inhaler, INHALE 2 PUFFS BY MOUTH EVERY 4 HOURS AS NEEDED, Disp: , Rfl: 4  •  warfarin (COUMADIN) 2.5 MG tablet, Atrial fibrillation  Indications: Atrial Fibrillation, Disp: 30 tablet, Rfl: 3  •  warfarin (COUMADIN) 5 MG tablet, Atrial fibrillation  Indications: Atrial Fibrillation, Disp: 30 tablet, Rfl: 3  •  Cholecalciferol (VITAMIN D3) 5000 UNITS capsule capsule, Take 5,000 Units by mouth As Needed., Disp: , Rfl:   •  ferrous sulfate 325 (65 FE) MG tablet, Take 325 mg by mouth. Twice  a week, Disp: , Rfl:   •  lisinopril (PRINIVIL,ZESTRIL) 2.5 MG tablet, Take 1 tablet by mouth Daily., Disp: 90 tablet, Rfl: 3  No current facility-administered medications for this visit.       Objective:     Vitals:    06/17/20 1023   BP: 138/86   BP Location: Right arm   Patient Position: Sitting   Pulse: 77   Resp: 16   SpO2: 93%   Weight: 72.6 kg (160 lb)   Height: 154.9 cm (61\")     Body mass index is 30.23 kg/m².    Physical Exam   Constitutional: She is oriented to person, place, and time. She appears well-developed and well-nourished.   HENT:   Head: Normocephalic.   Eyes: No scleral icterus.   Neck: No JVD present. No thyromegaly present.   Cardiovascular: Normal rate, regular rhythm and normal heart sounds. Exam reveals no gallop and no friction rub.   No murmur heard.  Pulmonary/Chest: Effort normal and breath sounds normal. She has no wheezes. She has no rales. "   Abdominal: Soft. There is no hepatosplenomegaly. There is no tenderness.   Musculoskeletal: Normal range of motion. She exhibits no edema.   Lymphadenopathy:     She has no cervical adenopathy.   Neurological: She is alert and oriented to person, place, and time.   Skin: Skin is warm and dry. No rash noted.   Psychiatric: She has a normal mood and affect.         ECG 12 Lead  Date/Time: 6/17/2020 10:50 AM  Performed by: Melquiades Root MD  Authorized by: Melquiades Root MD                Assessment:       Diagnosis Plan   1. Cardiac pacemaker in situ     2. Sick sinus syndrome (CMS/HCC)     3. Coronary artery disease involving native coronary artery of native heart without angina pectoris     4. Permanent atrial fibrillation (CMS/HCC)            Plan:       Well to follow the ischemia trial here and try and manage her medically happy and said I think the Coreg is making her raynauds worse and when I stop it and see how she feels if she feels starts having more angina will go back on it if she starts having more angina we will get a do a complex PCI on her LAD left main and circumflex but I think if we can try and manage her medically we will going to him when I see her back in 3 months sooner if she has trouble    As always, it has been a pleasure to participate in your patient's care.      Sincerely,       Melquiades Root MD

## 2020-06-22 ENCOUNTER — TREATMENT (OUTPATIENT)
Dept: CARDIAC REHAB | Facility: HOSPITAL | Age: 85
End: 2020-06-22

## 2020-06-22 DIAGNOSIS — I21.3 ST ELEVATION MYOCARDIAL INFARCTION (STEMI), UNSPECIFIED ARTERY (HCC): Primary | ICD-10-CM

## 2020-06-22 PROCEDURE — 93798 PHYS/QHP OP CAR RHAB W/ECG: CPT

## 2020-06-24 ENCOUNTER — TREATMENT (OUTPATIENT)
Dept: CARDIAC REHAB | Facility: HOSPITAL | Age: 85
End: 2020-06-24

## 2020-06-24 DIAGNOSIS — I21.3 ST ELEVATION MYOCARDIAL INFARCTION (STEMI), UNSPECIFIED ARTERY (HCC): Primary | ICD-10-CM

## 2020-06-24 PROCEDURE — 93798 PHYS/QHP OP CAR RHAB W/ECG: CPT

## 2020-06-29 ENCOUNTER — TREATMENT (OUTPATIENT)
Dept: CARDIAC REHAB | Facility: HOSPITAL | Age: 85
End: 2020-06-29

## 2020-06-29 DIAGNOSIS — I21.3 ST ELEVATION MYOCARDIAL INFARCTION (STEMI), UNSPECIFIED ARTERY (HCC): Primary | ICD-10-CM

## 2020-06-29 PROCEDURE — 93798 PHYS/QHP OP CAR RHAB W/ECG: CPT

## 2020-06-30 DIAGNOSIS — I20.0 UNSTABLE ANGINA (HCC): Primary | ICD-10-CM

## 2020-07-07 ENCOUNTER — TRANSCRIBE ORDERS (OUTPATIENT)
Dept: CARDIOLOGY | Facility: CLINIC | Age: 85
End: 2020-07-07

## 2020-07-07 ENCOUNTER — LAB (OUTPATIENT)
Dept: LAB | Facility: HOSPITAL | Age: 85
End: 2020-07-07

## 2020-07-07 ENCOUNTER — TRANSCRIBE ORDERS (OUTPATIENT)
Dept: SLEEP MEDICINE | Facility: HOSPITAL | Age: 85
End: 2020-07-07

## 2020-07-07 DIAGNOSIS — Z01.818 OTHER SPECIFIED PRE-OPERATIVE EXAMINATION: ICD-10-CM

## 2020-07-07 DIAGNOSIS — Z13.6 SCREENING FOR ISCHEMIC HEART DISEASE: ICD-10-CM

## 2020-07-07 DIAGNOSIS — Z01.810 PRE-OPERATIVE CARDIOVASCULAR EXAMINATION: ICD-10-CM

## 2020-07-07 DIAGNOSIS — Z01.818 OTHER SPECIFIED PRE-OPERATIVE EXAMINATION: Primary | ICD-10-CM

## 2020-07-07 DIAGNOSIS — Z01.810 PRE-OPERATIVE CARDIOVASCULAR EXAMINATION: Primary | ICD-10-CM

## 2020-07-07 PROBLEM — I20.0 UNSTABLE ANGINA: Status: ACTIVE | Noted: 2020-07-07

## 2020-07-07 LAB
ANION GAP SERPL CALCULATED.3IONS-SCNC: 12.1 MMOL/L (ref 5–15)
BASOPHILS # BLD AUTO: 0.05 10*3/MM3 (ref 0–0.2)
BASOPHILS NFR BLD AUTO: 0.7 % (ref 0–1.5)
BUN SERPL-MCNC: 21 MG/DL (ref 8–23)
BUN/CREAT SERPL: 17.9 (ref 7–25)
CALCIUM SPEC-SCNC: 10.3 MG/DL (ref 8.6–10.5)
CHLORIDE SERPL-SCNC: 101 MMOL/L (ref 98–107)
CO2 SERPL-SCNC: 25.9 MMOL/L (ref 22–29)
CREAT SERPL-MCNC: 1.17 MG/DL (ref 0.57–1)
DEPRECATED RDW RBC AUTO: 45.7 FL (ref 37–54)
EOSINOPHIL # BLD AUTO: 0.03 10*3/MM3 (ref 0–0.4)
EOSINOPHIL NFR BLD AUTO: 0.4 % (ref 0.3–6.2)
ERYTHROCYTE [DISTWIDTH] IN BLOOD BY AUTOMATED COUNT: 13.8 % (ref 12.3–15.4)
GFR SERPL CREATININE-BSD FRML MDRD: 44 ML/MIN/1.73
GLUCOSE SERPL-MCNC: 101 MG/DL (ref 65–99)
HCT VFR BLD AUTO: 39.2 % (ref 34–46.6)
HGB BLD-MCNC: 12.9 G/DL (ref 12–15.9)
IMM GRANULOCYTES # BLD AUTO: 0.03 10*3/MM3 (ref 0–0.05)
IMM GRANULOCYTES NFR BLD AUTO: 0.4 % (ref 0–0.5)
INR PPP: 1.89 (ref 0.9–1.1)
LYMPHOCYTES # BLD AUTO: 1.11 10*3/MM3 (ref 0.7–3.1)
LYMPHOCYTES NFR BLD AUTO: 16.6 % (ref 19.6–45.3)
MCH RBC QN AUTO: 29.5 PG (ref 26.6–33)
MCHC RBC AUTO-ENTMCNC: 32.9 G/DL (ref 31.5–35.7)
MCV RBC AUTO: 89.5 FL (ref 79–97)
MONOCYTES # BLD AUTO: 0.49 10*3/MM3 (ref 0.1–0.9)
MONOCYTES NFR BLD AUTO: 7.3 % (ref 5–12)
NEUTROPHILS NFR BLD AUTO: 4.96 10*3/MM3 (ref 1.7–7)
NEUTROPHILS NFR BLD AUTO: 74.6 % (ref 42.7–76)
NRBC BLD AUTO-RTO: 0 /100 WBC (ref 0–0.2)
PLATELET # BLD AUTO: 296 10*3/MM3 (ref 140–450)
PMV BLD AUTO: 10.9 FL (ref 6–12)
POTASSIUM SERPL-SCNC: 4.1 MMOL/L (ref 3.5–5.2)
PROTHROMBIN TIME: 21.2 SECONDS (ref 11.7–14.2)
RBC # BLD AUTO: 4.38 10*6/MM3 (ref 3.77–5.28)
SODIUM SERPL-SCNC: 139 MMOL/L (ref 136–145)
WBC # BLD AUTO: 6.67 10*3/MM3 (ref 3.4–10.8)

## 2020-07-07 PROCEDURE — 36415 COLL VENOUS BLD VENIPUNCTURE: CPT

## 2020-07-07 PROCEDURE — U0004 COV-19 TEST NON-CDC HGH THRU: HCPCS

## 2020-07-07 PROCEDURE — 85025 COMPLETE CBC W/AUTO DIFF WBC: CPT

## 2020-07-07 PROCEDURE — 80048 BASIC METABOLIC PNL TOTAL CA: CPT

## 2020-07-07 PROCEDURE — C9803 HOPD COVID-19 SPEC COLLECT: HCPCS

## 2020-07-07 PROCEDURE — 85610 PROTHROMBIN TIME: CPT

## 2020-07-08 LAB
REF LAB TEST METHOD: NORMAL
SARS-COV-2 RNA RESP QL NAA+PROBE: NOT DETECTED

## 2020-07-09 ENCOUNTER — HOSPITAL ENCOUNTER (OUTPATIENT)
Facility: HOSPITAL | Age: 85
Discharge: HOME HEALTH | End: 2020-07-10
Attending: INTERNAL MEDICINE | Admitting: INTERNAL MEDICINE

## 2020-07-09 DIAGNOSIS — I20.0 UNSTABLE ANGINA (HCC): ICD-10-CM

## 2020-07-09 DIAGNOSIS — Z95.5 S/P DRUG ELUTING CORONARY STENT PLACEMENT: Primary | ICD-10-CM

## 2020-07-09 LAB
ACT BLD: 268 SECONDS (ref 82–152)
ACT BLD: 279 SECONDS (ref 82–152)
ACT BLD: 329 SECONDS (ref 82–152)
INR PPP: 1.1 (ref 0.8–1.2)
INR PPP: 1.1 (ref 0.9–1.1)
INR PPP: 1.91 (ref 0.9–1.1)
PROTHROMBIN TIME: 13.6 SECONDS
PROTHROMBIN TIME: 13.6 SECONDS (ref 12.8–15.2)
PROTHROMBIN TIME: 21.4 SECONDS (ref 11.7–14.2)

## 2020-07-09 PROCEDURE — C9600 PERC DRUG-EL COR STENT SING: HCPCS | Performed by: INTERNAL MEDICINE

## 2020-07-09 PROCEDURE — A9270 NON-COVERED ITEM OR SERVICE: HCPCS | Performed by: INTERNAL MEDICINE

## 2020-07-09 PROCEDURE — 93010 ELECTROCARDIOGRAM REPORT: CPT | Performed by: INTERNAL MEDICINE

## 2020-07-09 PROCEDURE — C1769 GUIDE WIRE: HCPCS | Performed by: INTERNAL MEDICINE

## 2020-07-09 PROCEDURE — C1725 CATH, TRANSLUMIN NON-LASER: HCPCS | Performed by: INTERNAL MEDICINE

## 2020-07-09 PROCEDURE — C1760 CLOSURE DEV, VASC: HCPCS | Performed by: INTERNAL MEDICINE

## 2020-07-09 PROCEDURE — C1874 STENT, COATED/COV W/DEL SYS: HCPCS | Performed by: INTERNAL MEDICINE

## 2020-07-09 PROCEDURE — 63710000001 FUROSEMIDE 40 MG TABLET: Performed by: INTERNAL MEDICINE

## 2020-07-09 PROCEDURE — C1894 INTRO/SHEATH, NON-LASER: HCPCS | Performed by: INTERNAL MEDICINE

## 2020-07-09 PROCEDURE — C1724 CATH, TRANS ATHEREC,ROTATION: HCPCS | Performed by: INTERNAL MEDICINE

## 2020-07-09 PROCEDURE — 99153 MOD SED SAME PHYS/QHP EA: CPT | Performed by: INTERNAL MEDICINE

## 2020-07-09 PROCEDURE — 85610 PROTHROMBIN TIME: CPT

## 2020-07-09 PROCEDURE — G0378 HOSPITAL OBSERVATION PER HR: HCPCS

## 2020-07-09 PROCEDURE — 63710000001 WARFARIN 5 MG TABLET: Performed by: INTERNAL MEDICINE

## 2020-07-09 PROCEDURE — 63710000001 FAMOTIDINE 20 MG TABLET: Performed by: INTERNAL MEDICINE

## 2020-07-09 PROCEDURE — 25010000002 MIDAZOLAM PER 1 MG: Performed by: INTERNAL MEDICINE

## 2020-07-09 PROCEDURE — 63710000001 SPIRONOLACTONE 25 MG TABLET: Performed by: INTERNAL MEDICINE

## 2020-07-09 PROCEDURE — 99152 MOD SED SAME PHYS/QHP 5/>YRS: CPT | Performed by: INTERNAL MEDICINE

## 2020-07-09 PROCEDURE — 63710000001 CARBIDOPA-LEVODOPA 10-100 MG TABLET: Performed by: INTERNAL MEDICINE

## 2020-07-09 PROCEDURE — 63710000001 TEMAZEPAM 15 MG CAPSULE: Performed by: INTERNAL MEDICINE

## 2020-07-09 PROCEDURE — 92933 PRQ TRLML C ATHRC ST ANGIOP1: CPT | Performed by: INTERNAL MEDICINE

## 2020-07-09 PROCEDURE — C1887 CATHETER, GUIDING: HCPCS | Performed by: INTERNAL MEDICINE

## 2020-07-09 PROCEDURE — 93454 CORONARY ARTERY ANGIO S&I: CPT | Performed by: INTERNAL MEDICINE

## 2020-07-09 PROCEDURE — 25010000002 HEPARIN (PORCINE) PER 1000 UNITS: Performed by: INTERNAL MEDICINE

## 2020-07-09 PROCEDURE — 92928 PRQ TCAT PLMT NTRAC ST 1 LES: CPT | Performed by: INTERNAL MEDICINE

## 2020-07-09 PROCEDURE — 63710000001 FLUTICASONE 50 MCG/ACT SUSPENSION 16 G BOTTLE: Performed by: INTERNAL MEDICINE

## 2020-07-09 PROCEDURE — 63710000001 VITAMIN B-6 50 MG TABLET: Performed by: INTERNAL MEDICINE

## 2020-07-09 PROCEDURE — 85610 PROTHROMBIN TIME: CPT | Performed by: INTERNAL MEDICINE

## 2020-07-09 PROCEDURE — 63710000001 ATORVASTATIN 20 MG TABLET: Performed by: INTERNAL MEDICINE

## 2020-07-09 PROCEDURE — 85347 COAGULATION TIME ACTIVATED: CPT

## 2020-07-09 PROCEDURE — 25010000002 FENTANYL CITRATE (PF) 100 MCG/2ML SOLUTION: Performed by: INTERNAL MEDICINE

## 2020-07-09 PROCEDURE — 0 IOPAMIDOL PER 1 ML: Performed by: INTERNAL MEDICINE

## 2020-07-09 PROCEDURE — 93005 ELECTROCARDIOGRAM TRACING: CPT | Performed by: INTERNAL MEDICINE

## 2020-07-09 PROCEDURE — 25010000002 ONDANSETRON PER 1 MG: Performed by: INTERNAL MEDICINE

## 2020-07-09 DEVICE — XIENCE SIERRA™ EVEROLIMUS ELUTING CORONARY STENT SYSTEM 2.50 MM X 23 MM / RAPID-EXCHANGE
Type: IMPLANTABLE DEVICE | Status: FUNCTIONAL
Brand: XIENCE SIERRA™

## 2020-07-09 DEVICE — XIENCE SIERRA™ EVEROLIMUS ELUTING CORONARY STENT SYSTEM 2.75 MM X 08 MM / RAPID-EXCHANGE
Type: IMPLANTABLE DEVICE | Status: FUNCTIONAL
Brand: XIENCE SIERRA™

## 2020-07-09 DEVICE — XIENCE SIERRA™ EVEROLIMUS ELUTING CORONARY STENT SYSTEM 3.00 MM X 18 MM / RAPID-EXCHANGE
Type: IMPLANTABLE DEVICE | Status: FUNCTIONAL
Brand: XIENCE SIERRA™

## 2020-07-09 RX ORDER — ONDANSETRON 4 MG/1
4 TABLET, FILM COATED ORAL EVERY 6 HOURS PRN
Status: DISCONTINUED | OUTPATIENT
Start: 2020-07-09 | End: 2020-07-10 | Stop reason: HOSPADM

## 2020-07-09 RX ORDER — LIDOCAINE HYDROCHLORIDE 10 MG/ML
0.1 INJECTION, SOLUTION EPIDURAL; INFILTRATION; INTRACAUDAL; PERINEURAL ONCE AS NEEDED
Status: DISCONTINUED | OUTPATIENT
Start: 2020-07-09 | End: 2020-07-09

## 2020-07-09 RX ORDER — CLOPIDOGREL BISULFATE 75 MG/1
TABLET ORAL AS NEEDED
Status: DISCONTINUED | OUTPATIENT
Start: 2020-07-09 | End: 2020-07-09 | Stop reason: HOSPADM

## 2020-07-09 RX ORDER — LIDOCAINE HYDROCHLORIDE 20 MG/ML
INJECTION, SOLUTION INFILTRATION; PERINEURAL AS NEEDED
Status: DISCONTINUED | OUTPATIENT
Start: 2020-07-09 | End: 2020-07-09 | Stop reason: HOSPADM

## 2020-07-09 RX ORDER — HYDROCODONE BITARTRATE AND ACETAMINOPHEN 5; 325 MG/1; MG/1
1 TABLET ORAL EVERY 4 HOURS PRN
Status: DISCONTINUED | OUTPATIENT
Start: 2020-07-09 | End: 2020-07-10 | Stop reason: HOSPADM

## 2020-07-09 RX ORDER — WARFARIN SODIUM 5 MG/1
5 TABLET ORAL
Status: DISCONTINUED | OUTPATIENT
Start: 2020-07-13 | End: 2020-07-10 | Stop reason: HOSPADM

## 2020-07-09 RX ORDER — ACETAMINOPHEN 325 MG/1
650 TABLET ORAL EVERY 4 HOURS PRN
Status: DISCONTINUED | OUTPATIENT
Start: 2020-07-09 | End: 2020-07-10 | Stop reason: HOSPADM

## 2020-07-09 RX ORDER — FUROSEMIDE 40 MG/1
40 TABLET ORAL DAILY
Status: DISCONTINUED | OUTPATIENT
Start: 2020-07-09 | End: 2020-07-10 | Stop reason: HOSPADM

## 2020-07-09 RX ORDER — MIDAZOLAM HYDROCHLORIDE 1 MG/ML
INJECTION INTRAMUSCULAR; INTRAVENOUS AS NEEDED
Status: DISCONTINUED | OUTPATIENT
Start: 2020-07-09 | End: 2020-07-09 | Stop reason: HOSPADM

## 2020-07-09 RX ORDER — WARFARIN SODIUM 2.5 MG/1
2.5 TABLET ORAL
Status: DISCONTINUED | OUTPATIENT
Start: 2020-07-10 | End: 2020-07-10 | Stop reason: HOSPADM

## 2020-07-09 RX ORDER — HEPARIN SODIUM 1000 [USP'U]/ML
INJECTION, SOLUTION INTRAVENOUS; SUBCUTANEOUS AS NEEDED
Status: DISCONTINUED | OUTPATIENT
Start: 2020-07-09 | End: 2020-07-09 | Stop reason: HOSPADM

## 2020-07-09 RX ORDER — ATORVASTATIN CALCIUM 20 MG/1
20 TABLET, FILM COATED ORAL DAILY
Status: DISCONTINUED | OUTPATIENT
Start: 2020-07-09 | End: 2020-07-10 | Stop reason: HOSPADM

## 2020-07-09 RX ORDER — CLOPIDOGREL BISULFATE 75 MG/1
75 TABLET ORAL DAILY
Status: DISCONTINUED | OUTPATIENT
Start: 2020-07-10 | End: 2020-07-10 | Stop reason: HOSPADM

## 2020-07-09 RX ORDER — ACETAMINOPHEN 325 MG/1
325 TABLET ORAL 2 TIMES DAILY PRN
Status: DISCONTINUED | OUTPATIENT
Start: 2020-07-09 | End: 2020-07-09 | Stop reason: SDUPTHER

## 2020-07-09 RX ORDER — FENTANYL CITRATE 50 UG/ML
INJECTION, SOLUTION INTRAMUSCULAR; INTRAVENOUS AS NEEDED
Status: DISCONTINUED | OUTPATIENT
Start: 2020-07-09 | End: 2020-07-09 | Stop reason: HOSPADM

## 2020-07-09 RX ORDER — ONDANSETRON 2 MG/ML
4 INJECTION INTRAMUSCULAR; INTRAVENOUS EVERY 6 HOURS PRN
Status: DISCONTINUED | OUTPATIENT
Start: 2020-07-09 | End: 2020-07-10 | Stop reason: HOSPADM

## 2020-07-09 RX ORDER — ONDANSETRON 2 MG/ML
INJECTION INTRAMUSCULAR; INTRAVENOUS AS NEEDED
Status: DISCONTINUED | OUTPATIENT
Start: 2020-07-09 | End: 2020-07-09 | Stop reason: HOSPADM

## 2020-07-09 RX ORDER — SODIUM CHLORIDE 9 MG/ML
75 INJECTION, SOLUTION INTRAVENOUS CONTINUOUS
Status: ACTIVE | OUTPATIENT
Start: 2020-07-09 | End: 2020-07-09

## 2020-07-09 RX ORDER — SPIRONOLACTONE 25 MG/1
25 TABLET ORAL DAILY
Status: DISCONTINUED | OUTPATIENT
Start: 2020-07-09 | End: 2020-07-10 | Stop reason: HOSPADM

## 2020-07-09 RX ORDER — SODIUM CHLORIDE 0.9 % (FLUSH) 0.9 %
10 SYRINGE (ML) INJECTION AS NEEDED
Status: DISCONTINUED | OUTPATIENT
Start: 2020-07-09 | End: 2020-07-09

## 2020-07-09 RX ORDER — ASPIRIN 325 MG
TABLET ORAL AS NEEDED
Status: DISCONTINUED | OUTPATIENT
Start: 2020-07-09 | End: 2020-07-09 | Stop reason: HOSPADM

## 2020-07-09 RX ORDER — FLUTICASONE PROPIONATE 50 MCG
2 SPRAY, SUSPENSION (ML) NASAL DAILY
Status: DISCONTINUED | OUTPATIENT
Start: 2020-07-09 | End: 2020-07-10 | Stop reason: HOSPADM

## 2020-07-09 RX ORDER — SODIUM CHLORIDE 9 MG/ML
75 INJECTION, SOLUTION INTRAVENOUS CONTINUOUS
Status: DISCONTINUED | OUTPATIENT
Start: 2020-07-09 | End: 2020-07-10 | Stop reason: HOSPADM

## 2020-07-09 RX ORDER — SODIUM CHLORIDE 0.9 % (FLUSH) 0.9 %
3 SYRINGE (ML) INJECTION EVERY 12 HOURS SCHEDULED
Status: DISCONTINUED | OUTPATIENT
Start: 2020-07-09 | End: 2020-07-09

## 2020-07-09 RX ORDER — FAMOTIDINE 20 MG/1
20 TABLET, FILM COATED ORAL 2 TIMES DAILY
Status: DISCONTINUED | OUTPATIENT
Start: 2020-07-09 | End: 2020-07-10 | Stop reason: HOSPADM

## 2020-07-09 RX ORDER — CLOPIDOGREL BISULFATE 75 MG/1
75 TABLET ORAL DAILY
Status: DISCONTINUED | OUTPATIENT
Start: 2020-07-09 | End: 2020-07-09 | Stop reason: SDUPTHER

## 2020-07-09 RX ORDER — TEMAZEPAM 15 MG/1
15 CAPSULE ORAL NIGHTLY PRN
Status: DISCONTINUED | OUTPATIENT
Start: 2020-07-09 | End: 2020-07-10 | Stop reason: HOSPADM

## 2020-07-09 RX ORDER — WARFARIN SODIUM 5 MG/1
5 TABLET ORAL
Status: COMPLETED | OUTPATIENT
Start: 2020-07-09 | End: 2020-07-09

## 2020-07-09 RX ORDER — LANOLIN ALCOHOL/MO/W.PET/CERES
200 CREAM (GRAM) TOPICAL DAILY
Status: DISCONTINUED | OUTPATIENT
Start: 2020-07-09 | End: 2020-07-10 | Stop reason: HOSPADM

## 2020-07-09 RX ADMIN — FAMOTIDINE 20 MG: 20 TABLET, FILM COATED ORAL at 20:12

## 2020-07-09 RX ADMIN — Medication 200 MG: at 13:40

## 2020-07-09 RX ADMIN — SPIRONOLACTONE 25 MG: 25 TABLET, FILM COATED ORAL at 13:40

## 2020-07-09 RX ADMIN — TEMAZEPAM 15 MG: 15 CAPSULE ORAL at 20:12

## 2020-07-09 RX ADMIN — WARFARIN 5 MG: 5 TABLET ORAL at 16:27

## 2020-07-09 RX ADMIN — ATORVASTATIN CALCIUM 20 MG: 20 TABLET, FILM COATED ORAL at 13:39

## 2020-07-09 RX ADMIN — CARBIDOPA AND LEVODOPA 1 TABLET: 10; 100 TABLET ORAL at 20:12

## 2020-07-09 RX ADMIN — SODIUM CHLORIDE 75 ML/HR: 9 INJECTION, SOLUTION INTRAVENOUS at 08:28

## 2020-07-09 RX ADMIN — FAMOTIDINE 20 MG: 20 TABLET, FILM COATED ORAL at 13:40

## 2020-07-09 RX ADMIN — FLUTICASONE PROPIONATE 2 SPRAY: 50 SPRAY, METERED NASAL at 13:40

## 2020-07-09 RX ADMIN — FUROSEMIDE 40 MG: 40 TABLET ORAL at 16:27

## 2020-07-09 NOTE — PROGRESS NOTES
Pharmacy Consult: Warfarin Dosing/ Monitoring    Vicky Stewart is a 86 y.o. female, estimated creatinine clearance is 30.7 mL/min (A) (by C-G formula based on SCr of 1.17 mg/dL (H)). weighing 69.4 kg (153 lb).     has a past medical history of Acute MI, inferior wall (CMS/HCC) (2020), Anemia, Atrial fibrillation (CMS/HCC), Atrial flutter (CMS/HCC), AV block, 3rd degree (CMS/HCC), Carotid artery stenosis, Dermatitis, GERD (gastroesophageal reflux disease), History of EKG (2016), Hyperlipidemia, Hypertension, Low back pain, Osteoarthritis, Osteoporosis, Pacemaker, Palpitations, Raynaud's phenomenon, SOB (shortness of breath) on exertion, SSS (sick sinus syndrome) (CMS/[facility]), URI, acute, and Venous insufficiency.    Social History     Tobacco Use    Smoking status: Former Smoker     Packs/day: 1.50     Start date:      Last attempt to quit:      Years since quittin.5    Smokeless tobacco: Never Used    Tobacco comment: CAFFEINE USE: 3-4 CUPS DECAF COFFEE DAILY   Substance Use Topics    Alcohol use: No    Drug use: No       Results from last 7 days   Lab Units 20  1214 20  0832 20  0828 20  1450   INR  1.91* 1.1 1.1 1.89*   HEMOGLOBIN g/dL  --   --   --  12.9   HEMATOCRIT %  --   --   --  39.2   PLATELETS 10*3/mm3  --   --   --  296     Results from last 7 days   Lab Units 20  1450   SODIUM mmol/L 139   POTASSIUM mmol/L 4.1   CHLORIDE mmol/L 101   CO2 mmol/L 25.9   BUN mg/dL 21   CREATININE mg/dL 1.17*   CALCIUM mg/dL 10.3   GLUCOSE mg/dL 101*     Anticoagulation history: I personally spoke to the patient and she states that her dose regimen is as follows:  Warfarin 5 mg PO Monday  Warfarin 2.5 mg all other days  She states the her last dose of warfarin was .     Hospital Anticoagulation:  Consulting provider: Melquiades Root MD  Start date: home med   Indication: Atrial Fibrillation  Target INR: 2-3  Expected duration: lifelong   Bridge Therapy: No                   Date 7/9            INR 1.91            Warfarin dose 5 mg              Potential drug interactions:   Clopidogrel (home med) - may result in increased risk of bleeding.   Spironolactone - may result in decreased anticoagulant effectiveness.    Relevant nutrition status: Diet Regular; Vegetarian; No Red Meat     Other: Patient to cath lab today for stent placement - warfarin has been held for procedure - last dose Sunday 7/5/2020    Education complete?/ Date: home med    Assessment/Plan:  Today's INR=1.91 which is below goal range of 2-3, but expected d/t held dosing prior to procedure. Agree with warfarin 5 mg PO x1 this evening and will plan to resume home dosing regimen tomorrow 7/10. Daily INR on order and Clinical [facility] will follow-up tomorrow AM.    Pharmacy will continue to follow until discharge or discontinuation of warfarin.   Zach Vance, ARPITA  7/9/2020

## 2020-07-09 NOTE — INTERVAL H&P NOTE
She called about a week ago complaining of chest discomfort that was pretty severe.  At the time that she had her STEMI in March we found that she had severe left main disease and LAD disease.  She was not having much in the way of angina before that and we have tried to manage her medically however all of our medical interventions have not been tolerated.  She has a high risk lesion in her left main and I think at this point our options are limited to try and a complex PCI of the left main I had a long discussion about that with her last week and then also again today with her and her daughter.  We will plan on doing a Rotablator to the left main and then PCI to the LAD and then a crushing stenting to the left main into the circumflex and LAD.  We will have an Impella on standby if we need it.  We will have to do this from a femoral approach..  H&P reviewed. The patient was examined and there are no changes to the H&P. I have explained the risks and benefits of the procedure to the patient.  The patient understands and agrees to proceed

## 2020-07-10 VITALS
BODY MASS INDEX: 28.89 KG/M2 | DIASTOLIC BLOOD PRESSURE: 93 MMHG | WEIGHT: 153 LBS | TEMPERATURE: 97.8 F | OXYGEN SATURATION: 96 % | SYSTOLIC BLOOD PRESSURE: 154 MMHG | HEIGHT: 61 IN | HEART RATE: 79 BPM | RESPIRATION RATE: 14 BRPM

## 2020-07-10 LAB
ANION GAP SERPL CALCULATED.3IONS-SCNC: 8.6 MMOL/L (ref 5–15)
BUN SERPL-MCNC: 19 MG/DL (ref 8–23)
BUN/CREAT SERPL: 24.4 (ref 7–25)
CALCIUM SPEC-SCNC: 9.6 MG/DL (ref 8.6–10.5)
CHLORIDE SERPL-SCNC: 105 MMOL/L (ref 98–107)
CHOLEST SERPL-MCNC: 141 MG/DL (ref 0–200)
CO2 SERPL-SCNC: 25.4 MMOL/L (ref 22–29)
CREAT SERPL-MCNC: 0.78 MG/DL (ref 0.57–1)
DEPRECATED RDW RBC AUTO: 45.5 FL (ref 37–54)
ERYTHROCYTE [DISTWIDTH] IN BLOOD BY AUTOMATED COUNT: 13.8 % (ref 12.3–15.4)
GFR SERPL CREATININE-BSD FRML MDRD: 70 ML/MIN/1.73
GLUCOSE SERPL-MCNC: 94 MG/DL (ref 65–99)
HBA1C MFR BLD: 5.6 % (ref 4.8–5.6)
HCT VFR BLD AUTO: 31.7 % (ref 34–46.6)
HDLC SERPL-MCNC: 67 MG/DL (ref 40–60)
HGB BLD-MCNC: 10.3 G/DL (ref 12–15.9)
INR PPP: 1.4 (ref 0.9–1.1)
LDLC SERPL CALC-MCNC: 62 MG/DL (ref 0–100)
LDLC/HDLC SERPL: 0.92 {RATIO}
MCH RBC QN AUTO: 29.2 PG (ref 26.6–33)
MCHC RBC AUTO-ENTMCNC: 32.5 G/DL (ref 31.5–35.7)
MCV RBC AUTO: 89.8 FL (ref 79–97)
PLATELET # BLD AUTO: 219 10*3/MM3 (ref 140–450)
PMV BLD AUTO: 11.1 FL (ref 6–12)
POTASSIUM SERPL-SCNC: 4.2 MMOL/L (ref 3.5–5.2)
PROTHROMBIN TIME: 16.9 SECONDS (ref 11.7–14.2)
RBC # BLD AUTO: 3.53 10*6/MM3 (ref 3.77–5.28)
SODIUM SERPL-SCNC: 139 MMOL/L (ref 136–145)
TRIGL SERPL-MCNC: 62 MG/DL (ref 0–150)
VLDLC SERPL-MCNC: 12.4 MG/DL (ref 5–40)
WBC # BLD AUTO: 7.76 10*3/MM3 (ref 3.4–10.8)

## 2020-07-10 PROCEDURE — 80048 BASIC METABOLIC PNL TOTAL CA: CPT | Performed by: INTERNAL MEDICINE

## 2020-07-10 PROCEDURE — 63710000001 FUROSEMIDE 40 MG TABLET: Performed by: INTERNAL MEDICINE

## 2020-07-10 PROCEDURE — 93010 ELECTROCARDIOGRAM REPORT: CPT | Performed by: INTERNAL MEDICINE

## 2020-07-10 PROCEDURE — A9270 NON-COVERED ITEM OR SERVICE: HCPCS | Performed by: INTERNAL MEDICINE

## 2020-07-10 PROCEDURE — 93005 ELECTROCARDIOGRAM TRACING: CPT | Performed by: INTERNAL MEDICINE

## 2020-07-10 PROCEDURE — 85610 PROTHROMBIN TIME: CPT | Performed by: INTERNAL MEDICINE

## 2020-07-10 PROCEDURE — 83036 HEMOGLOBIN GLYCOSYLATED A1C: CPT | Performed by: INTERNAL MEDICINE

## 2020-07-10 PROCEDURE — 63710000001 CLOPIDOGREL 75 MG TABLET: Performed by: INTERNAL MEDICINE

## 2020-07-10 PROCEDURE — 63710000001 FAMOTIDINE 20 MG TABLET: Performed by: INTERNAL MEDICINE

## 2020-07-10 PROCEDURE — 63710000001 ATORVASTATIN 20 MG TABLET: Performed by: INTERNAL MEDICINE

## 2020-07-10 PROCEDURE — 63710000001 VITAMIN B-6 50 MG TABLET: Performed by: INTERNAL MEDICINE

## 2020-07-10 PROCEDURE — 80061 LIPID PANEL: CPT | Performed by: INTERNAL MEDICINE

## 2020-07-10 PROCEDURE — G0378 HOSPITAL OBSERVATION PER HR: HCPCS

## 2020-07-10 PROCEDURE — 85027 COMPLETE CBC AUTOMATED: CPT | Performed by: INTERNAL MEDICINE

## 2020-07-10 PROCEDURE — 99217 PR OBSERVATION CARE DISCHARGE MANAGEMENT: CPT | Performed by: NURSE PRACTITIONER

## 2020-07-10 PROCEDURE — 63710000001 SPIRONOLACTONE 25 MG TABLET: Performed by: INTERNAL MEDICINE

## 2020-07-10 RX ORDER — ATORVASTATIN CALCIUM 40 MG/1
40 TABLET, FILM COATED ORAL DAILY
Qty: 30 TABLET | Refills: 4 | Status: SHIPPED | OUTPATIENT
Start: 2020-07-10 | End: 2020-12-21

## 2020-07-10 RX ORDER — PANTOPRAZOLE SODIUM 40 MG/1
40 TABLET, DELAYED RELEASE ORAL DAILY
Qty: 30 TABLET | Refills: 11 | Status: SHIPPED | OUTPATIENT
Start: 2020-07-10 | End: 2021-05-17

## 2020-07-10 RX ADMIN — SPIRONOLACTONE 25 MG: 25 TABLET, FILM COATED ORAL at 08:44

## 2020-07-10 RX ADMIN — ATORVASTATIN CALCIUM 20 MG: 20 TABLET, FILM COATED ORAL at 08:44

## 2020-07-10 RX ADMIN — FUROSEMIDE 40 MG: 40 TABLET ORAL at 08:44

## 2020-07-10 RX ADMIN — Medication 200 MG: at 08:44

## 2020-07-10 RX ADMIN — CLOPIDOGREL 75 MG: 75 TABLET, FILM COATED ORAL at 08:44

## 2020-07-10 RX ADMIN — FAMOTIDINE 20 MG: 20 TABLET, FILM COATED ORAL at 08:44

## 2020-07-10 RX ADMIN — FLUTICASONE PROPIONATE 2 SPRAY: 50 SPRAY, METERED NASAL at 08:49

## 2020-07-10 NOTE — PROGRESS NOTES
Discharge Planning Assessment  [facility]     Patient Name: Vicky Stewart  MRN: [id number]  Today's Date: 7/10/2020    Admit Date: 7/9/2020    Discharge Needs Assessment     Row Name 07/10/20 1019       Living Environment    Lives With  alone    Current Living Arrangements  home/apartment/condo    Primary Care Provided by  self    Provides Primary Care For  no one    Family Caregiver if Needed  child(roberta), adult    Family Caregiver Names  ephraim Yun    Quality of Family Relationships  helpful;involved;supportive    Able to Return to Prior Arrangements  yes       Resource/Environmental Concerns    Resource/Environmental Concerns  none       Transition Planning    Patient/Family Anticipates Transition to  home with family    Patient/Family Anticipated Services at Transition  home health care    Transportation Anticipated  family or friend will provide       Discharge Needs Assessment    Readmission Within the Last 30 Days  no previous admission in last 30 days    Concerns to be Addressed  discharge planning    Equipment Currently Used at Home  cane, straight;walker, rolling;grab bar;bath bench;other (see comments) pulse oximeter    Anticipated Changes Related to Illness  none    Equipment Needed After Discharge  none    Discharge Facility/Level of Care Needs  home with home health    Provided Post Acute Provider List?  N/A    N/A Provider List Comment  Pt already knew the home health agency she wanted to use.      Current Discharge Risk  lives alone        Discharge Plan     Row Name 07/10/20 1032       Plan    Plan  [facility] with [facility]     Plan Comments  CCP spoke with Pt at bedside.  CCP introduced self and role.  Pt confirmed information on face sheet.  Pt stated she is IADL'S, retired & drives.  Pt lives alone in a condominium with three entrance stair steps.  Pt reports PCP is Melquiades Martinez MD.  Pt confirms [facility].  Pt denies issues with affording her  "medications.  Pt denies past sub-acute rehab.  Pt reports she uses a cane, walker, grab bars and bath bench \"sometimes\", at home.  Pt reports she has used [facility] in past and she is requesting that they follow her again after this admission.  Referral given to OLIVER Ochoa at 10:04 AM.  Pt plans to return home with her daughter from [place], Gretchen transporting her home.  Pt reports her daughter will stay with her through the weekend.…KAYLEEN ONEAL/CCP        Destination      Coordination has not been started for this encounter.      Durable Medical Equipment      Coordination has not been started for this encounter.      Dialysis/Infusion      Coordination has not been started for this encounter.      Home Medical Care      Service Provider Request Status Selected Services Address Phone Number Fax Number    [facility] Pending - Request Sent N/A [address] [place] [address] [phone]      Therapy      Coordination has not been started for this encounter.      Community Resources      Coordination has not been started for this encounter.        Expected Discharge Date and Time     Expected Discharge Date Expected Discharge Time    Jul 10, 2020         Demographic Summary     Row Name 07/10/20 1018       General Information    Admission Type  observation    Arrived From  PACU/recovery room    Required Notices Provided  Observation Status Notice    Referral Source  admission list    Reason for Consult  discharge planning    Preferred Language  English     Used During This Interaction  no        Functional Status     Row Name 07/10/20 1018       Functional Status    Usual Activity Tolerance  moderate    Current Activity Tolerance  moderate       Functional Status, IADL    Medications  independent    Meal Preparation  independent    Housekeeping  independent    Laundry  independent    Shopping  assistive equipment       Mental Status    General Appearance WDL "  WDL       Mental Status Summary    Recent Changes in Mental Status/Cognitive Functioning  no changes       Employment/    Employment Status  retired        Psychosocial    No documentation.       Abuse/Neglect    No documentation.       Legal    No documentation.       Substance Abuse    No documentation.       Patient Forms    No documentation.           Marisa Cm RN

## 2020-07-10 NOTE — DISCHARGE PLACEMENT REQUEST
"Vicky Taveras NIRAV (86 y.o. Female)     Date of Birth Social Security Number Address Home Phone MRN    10/18/1933  [id number]  [place] [address] [phone] [id number]    Ep[employer]opalian Marital Status          Yazidi        Admission Date Admission Type Admitting Provider Attending Provider Department, Room/Bed    7/9/20 Elective Melquiades Root MD Dillon, William, MD [address] [facility], 2214/1    Discharge Date Discharge Disposition Discharge Destination         Home-Health Care [facility]              Attending Provider:  Melquiades Root MD    Allergies:  Carvedilol, Codeine, Lisinopril, Penicillins, Ranexa [Ranolazine Er], Sulfa Antibiotics, Tramadol    Isolation:  None   Infection:  None   Code Status:  CPR    Ht:  154.9 cm (61\")   Wt:  69.4 kg (153 lb)    Admission Cmt:  None   Principal Problem:  Unstable angina (CMS/HCC) [I20.0] More...                 Active Insurance as of 7/9/2020     Primary Coverage     Payor Plan Insurance Group Employer/Plan Group    MEDICARE MEDICARE A & B      Payor Plan Address Payor Plan Phone Number Payor Plan Fax Number Effective Dates    [address] [phone]  10/1/1998 - None Entered    [facility] [id number]       Subscriber Name Subscriber Birth Date Member ID       VICKY TAVERAS 10/18/1933 [id number]           Secondary Coverage     Payor Plan Insurance Group Employer/Plan Group    [facility] SUPP [id number]     Payor Plan Address Payor Plan Phone Number Payor Plan Fax Number Effective Dates    [address]   12/1/2010 - None Entered    [facility] [id number]       Subscriber Name Subscriber Birth Date Member ID       VICKY TAVERAS 10/18/1933 [id number]                 Emergency Contacts      (Rel.) Home Phone Work Phone Mobile Phone    KamimanViry (Daughter) [phone] -- [phone]              "

## 2020-07-10 NOTE — DISCHARGE SUMMARY
Patient Name: Vicky Stewart  :10/18/1933  86 y.o.    Date of Admit: 2020  Date of Discharge:  7/10/2020    Discharge Diagnosis:  1.  History of ST elevation MI in 2020  2.  Status post drug-eluting stent to mid RCA 2020  3.  Multivessel coronary disease with significant disease in the left main, LAD and circumflex   - Status post rotational arthrectomy angioplasty crushing drug-eluting stent to the left main extending into the LAD and  Circumflex with 4 Xience drug-eluting stents.  4.  Severe ischemic cardiomyopathy EF 43% on echocardiogram last month  5.  Moderate to severe MR  6.  Mild to moderate aortic stenosis  7.  Chronic systolic heart failure  8.  Persistent A. fib on warfarin  9.  Essential hypertension    Hospital Course:   The patient is an 86-year-old female who is known to me from previous had an ST elevation in March and had a drug-eluting stent placed to the mid right coronary.  She was deemed to not be a surgical candidate and we opted for medical management.  She is continued to have angina type symptoms and medical intervention has not been tolerated well.  She was admitted for high risk PCI of the left main.  Patient was admitted her Coumadin was held she underwent a rotational arthrectomy to the left main and then PCI to the LAD and crushing stent into the circumflex and LAD.  Patient tolerated procedure well.  She has had no postprocedure complications she has been ambulatory and is feeling good today.  Her right groin site has some mild ecchymosis but no evidence of hematoma.  I discussed with her at length her restrictions and instructions and the need for dual antiplatelet therapy.  I also increased her Lipitor to 40 mg from 20.  She has not had a statin intolerance.    Procedures Performed  Procedure(s):  Percutaneous Coronary Intervention  Stent CAROLYNN coronary  Atherectomy-coronary       Consults     No orders found for last 30 day(s).          Pertinent Test Results:    Results from last 7 days   Lab Units 07/10/20  0445   SODIUM mmol/L 139   POTASSIUM mmol/L 4.2   CHLORIDE mmol/L 105   CO2 mmol/L 25.4   BUN mg/dL 19   CREATININE mg/dL 0.78   CALCIUM mg/dL 9.6   GLUCOSE mg/dL 94         @LABRCNT(bnp)@  Results from last 7 days   Lab Units 07/10/20  0445   WBC 10*3/mm3 7.76   HEMOGLOBIN g/dL 10.3*   HEMATOCRIT % 31.7*   PLATELETS 10*3/mm3 219     Results from last 7 days   Lab Units 07/10/20  0445 07/09/20  1214 07/09/20  0832   INR  1.40* 1.91* 1.1         Results from last 7 days   Lab Units 07/10/20  0445   CHOLESTEROL mg/dL 141   TRIGLYCERIDES mg/dL 62   HDL CHOL mg/dL 67*   LDL CHOL mg/dL 62       Condition on Discharge: stable    Discharge Medications     Discharge Medications      New Medications      Instructions Start Date   pantoprazole 40 MG EC tablet  Commonly known as:  Protonix   40 mg, Oral, Daily         Changes to Medications      Instructions Start Date   atorvastatin 40 MG tablet  Commonly known as:  LIPITOR  What changed:  how much to take   40 mg, Oral, Daily         Continue These Medications      Instructions Start Date   acetaminophen 325 MG tablet  Commonly known as:  TYLENOL   325 mg, Oral, 2 Times Daily PRN      carbidopa-levodopa  MG per tablet  Commonly known as:  SINEMET   1 tablet, Oral, Nightly      clopidogrel 75 MG tablet  Commonly known as:  PLAVIX   75 mg, Oral, Daily      fluticasone 50 MCG/ACT nasal spray  Commonly known as:  FLONASE   2 sprays, Nasal, Daily      furosemide 40 MG tablet  Commonly known as:  LASIX   TAKE 1 TABLET BY MOUTH EVERY DAY      loratadine 10 MG tablet  Commonly known as:  CLARITIN   1 tablet, Oral, Daily      nitroglycerin 0.4 MG SL tablet  Commonly known as:  NITROSTAT   1 under the tongue as needed for angina, may repeat q5mins for up three doses      pyridoxine 200 MG tablet  Commonly known as:  VITAMIN B-6   1 tablet, Oral, Daily      saccharomyces boulardii 250 MG capsule  Commonly known as:  FLORASTOR   1  capsule, Oral, 2 Times Daily      spironolactone 25 MG tablet  Commonly known as:  ALDACTONE   TAKE 1 TABLET BY MOUTH EVERY DAY      temazepam 7.5 MG capsule  Commonly known as:  RESTORIL   15 mg.      umeclidinium-vilanterol 62.5-25 MCG/INH aerosol powder  inhaler  Commonly known as:  ANORO ELLIPTA   1 puff, Inhalation, Daily - RT      Ventolin  (90 Base) MCG/ACT inhaler  Generic drug:  albuterol sulfate HFA   INHALE 2 PUFFS BY MOUTH EVERY 4 HOURS AS NEEDED      vitamin D3 125 MCG (5000 UT) capsule capsule   5,000 Units, Oral, As Needed      warfarin 5 MG tablet  Commonly known as:  COUMADIN   Atrial fibrillation      warfarin 2.5 MG tablet  Commonly known as:  COUMADIN   Atrial fibrillation         Stop These Medications    famotidine 20 MG tablet  Commonly known as:  Pepcid     mupirocin 2 % ointment  Commonly known as:  Bactroban            Discharge Diet:     Activity at Discharge:     Discharge disposition: home    Follow-up Appointments  Future Appointments   Date Time Provider Department Center   7/13/2020 10:00 AM TELEMETRY MONITOR -  [facility]   7/15/2020 10:00 AM TELEMETRY MONITOR -  [facility]  [facility]   7/17/2020 10:00 AM TELEMETRY MONITOR -  [facility]   7/20/2020 10:00 AM TELEMETRY MONITOR - BH [facility]   7/22/2020 10:00 AM TELEMETRY MONITOR -  [facility]   7/24/2020 10:00 AM TELEMETRY MONITOR - BH [facility] BH [facility]   7/27/2020 10:00 AM TELEMETRY MONITOR - BH [facility] BH [facility]   7/29/2020 10:00 AM TELEMETRY MONITOR -  [facility] BH [facility]   7/31/2020 10:00 AM TELEMETRY MONITOR -  [facility]   8/3/2020 10:00 AM TELEMETRY MONITOR - BH [facility] BH [facility]   8/5/2020 10:00 AM TELEMETRY MONITOR -  [facility] BH [facility] CAR [facility]   8/5/2020  1:00 PM PACEART IN OFFICE, LCG [facility] CD LCG[facility] None   8/5/2020  1:20 PM Kale Méndez MD MGK CD LCGKR None   8/7/2020 10:00 AM TELEMETRY MONITOR -  [facility] LENA   [facility] CAR [facility]   8/10/2020 10:00 AM TELEMETRY MONITOR - BH [facility] CARD BH [facility] CAR [facility]   8/12/2020 10:00 AM TELEMETRY MONITOR - BH [facility] CARD BH [facility] CAR [facility]   8/14/2020 10:00 AM TELEMETRY MONITOR - BH [facility] CARD BH [facility] CAR [facility]   8/17/2020 10:00 AM TELEMETRY MONITOR - BH [facility] CARD BH [facility] CAR [facility]   8/19/2020 10:00 AM TELEMETRY MONITOR - BH [facility] CARD BH [facility] CAR [facility]   8/21/2020 10:00 AM TELEMETRY MONITOR - BH [facility] CARD BH [facility] CAR [facility]   8/24/2020 10:00 AM TELEMETRY MONITOR - BH [facility] CARD BH [facility] CAR [facility]   8/26/2020 10:00 AM TELEMETRY MONITOR - BH [facility] CARD BH [facility] CAR [facility]   8/28/2020 10:00 AM TELEMETRY MONITOR - BH [facility] CARD BH [facility] CAR [facility]   8/31/2020 10:00 AM TELEMETRY MONITOR - BH [facility] CARD BH [facility] CAR [facility]   9/2/2020 10:00 AM TELEMETRY MONITOR - BH [facility] CARD BH [facility] CAR [facility]   9/4/2020 10:00 AM TELEMETRY MONITOR - BH [facility] CARD BH [facility] CAR [facility]   9/9/2020 10:00 AM TELEMETRY MONITOR - BH [facility] CARD BH [facility] CAR [facility]   9/11/2020 10:00 AM TELEMETRY MONITOR - BH [facility] CARD BH [facility] CAR [facility]   9/14/2020 10:00 AM TELEMETRY MONITOR - BH [facility] CARD BH [facility] CAR [facility]   9/15/2020  2:20 PM Melquiades Root MD MGK CD LCGKR None   9/16/2020 10:00 AM TELEMETRY MONITOR - BH [facility] CARD BH [facility] CAR [facility]   9/18/2020 10:00 AM TELEMETRY MONITOR - BH [facility] CARD BH [facility] CAR [facility]   9/21/2020 10:00 AM TELEMETRY MONITOR - BH [facility] CARD BH [facility] CAR [facility]     Additional Instructions for the Follow-ups that You Need to Schedule     Ambulatory Referral to Cardiac Rehab   As directed      Protime-INR    Jul 14, 2020 (Approximate)      Is Patient on anti-coag:  Yes               Test Results Pending at Discharge       RONEL Campbell, [facility]  07/10/20  11:32    Time: Discharge 40 min  Electronically signed by RONEL Campbell, 07/10/20, 11:32 AM.

## 2020-07-10 NOTE — PROGRESS NOTES
Case Management Discharge Note      Final Note: Pt d/c home with [facility] scheduled to follow.  KAYLEEN ONEAL/CCP    Provided Post Acute Provider List?: N/A  N/A Provider List Comment: Pt already knew the home health agency she wanted to use.      Destination      No service has been selected for the patient.      Durable Medical Equipment      No service has been selected for the patient.      Dialysis/Infusion      No service has been selected for the patient.      Home Medical Care - Selection Complete      Service Provider Request Status Selected Services Address Phone Number Fax Number    [facility] [address] [place] [address] [phone]      Therapy      No service has been selected for the patient.      Community Resources      No service has been selected for the patient.             Final Discharge Disposition Code: 06 - home with home health care

## 2020-07-10 NOTE — DISCHARGE INSTRUCTIONS
No lifting over 5 lbs in 1 week  No hot tubs or tub baths  Ok for showers  No stairs for 3 days  No driving for 24 hours

## 2020-07-10 NOTE — PROGRESS NOTES
"Continued Stay Note  [facility]     Patient Name: Vicky Stewart  MRN: [id number]  Today's Date: 7/10/2020    Admit Date: 7/9/2020    Discharge Plan     Row Name 07/10/20 1118       Plan    Plan  HOME W/ [facility]    Plan Comments  OLIVER Ochoa confirmed they can follow Pt at home upon discharge.  KAYLEEN ONEAL/CCP    Row Name 07/10/20 1032       Plan    Plan  Home with [facility]     Plan Comments  CCP spoke with Pt at bedside.  CCP introduced self and role.  Pt confirmed information on face sheet.  Pt stated she is IADL'S, retired & drives.  Pt lives alone in a condominium with three entrance stair steps.  Pt reports PCP is Melquiades Martinez MD.  Pt confirms pharmacy as [facility].  Pt denies issues with affording her medications.  Pt denies past sub-acute rehab.  Pt reports she uses a cane, walker, grab bars and bath bench \"sometimes\", at home.  Pt reports she has used [facility] in past and she is requesting that they follow her again after this admission.  Referral given to OLIVER Ochoa at 10:04 AM.  Pt plans to return home with her daughter from [place], Gretchen transporting her home.  Pt reports her daughter will stay with her through the weekend.…KAYLEEN ONEAL/CCP        Discharge Codes    No documentation.       Expected Discharge Date and Time     Expected Discharge Date Expected Discharge Time    Jul 10, 2020             Marisa Cm RN    "

## 2020-07-14 LAB — ACT BLD: 285 SECONDS (ref 82–152)

## 2020-07-24 ENCOUNTER — OFFICE VISIT (OUTPATIENT)
Dept: CARDIOLOGY | Facility: CLINIC | Age: 85
End: 2020-07-24

## 2020-07-24 VITALS
BODY MASS INDEX: 28.71 KG/M2 | WEIGHT: 156 LBS | DIASTOLIC BLOOD PRESSURE: 76 MMHG | HEIGHT: 62 IN | HEART RATE: 80 BPM | SYSTOLIC BLOOD PRESSURE: 160 MMHG

## 2020-07-24 DIAGNOSIS — E78.00 PURE HYPERCHOLESTEROLEMIA: ICD-10-CM

## 2020-07-24 DIAGNOSIS — I50.21 ACUTE SYSTOLIC CHF (CONGESTIVE HEART FAILURE) (HCC): Primary | ICD-10-CM

## 2020-07-24 DIAGNOSIS — I10 ESSENTIAL HYPERTENSION: ICD-10-CM

## 2020-07-24 DIAGNOSIS — I49.5 SICK SINUS SYNDROME (HCC): ICD-10-CM

## 2020-07-24 DIAGNOSIS — I25.10 CORONARY ARTERY DISEASE INVOLVING NATIVE CORONARY ARTERY OF NATIVE HEART WITHOUT ANGINA PECTORIS: ICD-10-CM

## 2020-07-24 DIAGNOSIS — Z95.0 CARDIAC PACEMAKER IN SITU: ICD-10-CM

## 2020-07-24 DIAGNOSIS — I21.19 ACUTE MI, INFERIOR WALL (HCC): ICD-10-CM

## 2020-07-24 DIAGNOSIS — Z95.820 S/P ANGIOPLASTY WITH STENT: ICD-10-CM

## 2020-07-24 DIAGNOSIS — I48.21 PERMANENT ATRIAL FIBRILLATION (HCC): ICD-10-CM

## 2020-07-24 DIAGNOSIS — I42.0 CARDIOMYOPATHY, DILATED (HCC): ICD-10-CM

## 2020-07-24 PROCEDURE — 99214 OFFICE O/P EST MOD 30 MIN: CPT | Performed by: NURSE PRACTITIONER

## 2020-07-24 PROCEDURE — 93000 ELECTROCARDIOGRAM COMPLETE: CPT | Performed by: NURSE PRACTITIONER

## 2020-07-24 RX ORDER — LOSARTAN POTASSIUM 25 MG/1
25 TABLET ORAL DAILY
Qty: 30 TABLET | Refills: 11 | Status: SHIPPED | OUTPATIENT
Start: 2020-07-24 | End: 2020-08-07 | Stop reason: SINTOL

## 2020-07-24 NOTE — PROGRESS NOTES
Date of Office Visit: 2020  Encounter Provider: RONEL Campbell  Place of Service: [facility] CARDIOLOGY  Patient Name: Vicky Stewart  :10/18/1933    Chief Complaint   Patient presents with   • Atrial Fibrillation   • Follow-up   :     HPI: Fermin Stewart is an 86-year-old female who is a patient of Dr. Root and is well-known to me from previous.  In March of this year she presented to the hospital and had an ST elevation MI and underwent drug-eluting stent placement to the mid right coronary.  She had other disease in the left main LAD and circumflex and was deemed to be not a surgical candidate so we opted for medical management.  She continued to have angina symptoms and medical intervention has not been well-tolerated.  She was admitted for elective high risk angioplasty and stenting of the left main.  On  she underwent a rotational arthrectomy to the left main and angioplasty to the LAD with crushing drug-eluting stent placement into the circumflex and the LAD.  She received 4 total drug-eluting stents.  She did well she had no postprocedure complications.  She did have some bruising to her right groin but it was soft.  Her Lipitor was increased from 20 to 40 mg.    She also has moderate to severe MR.  Her ejection fraction is 43%.  She has persistent A. fib and is on warfarin at home.  She also has mild to moderate aortic stenosis but is very functional and active.  Since leaving the hospital she really feels pretty good.  She is not having any chest discomfort or dyspnea yesterday she was able to go shopping and is tolerating activities well.  Her blood pressure has been running a little elevated.      Past Medical History:   Diagnosis Date   • Acute MI, inferior wall (CMS/HCC) 2020   • Anemia    • Atrial fibrillation (CMS/HCC)    • Atrial flutter (CMS/HCC)    • AV block, 3rd degree (CMS/HCC)    • Carotid artery stenosis    • Chronic systolic  heart failure (CMS/[facility])    • Dermatitis    • GERD (gastroesophageal reflux disease)    • History of EKG 06/17/2016   • Hyperlipidemia    • Hypertension    • Ischemic cardiomyopathy    • Low back pain    • Osteoarthritis     knee   • Osteoporosis    • Pacemaker    • Palpitations    • Persistent atrial fibrillation (CMS/[facility])     on warfarin   • Raynaud's phenomenon    • SOB (shortness of breath) on exertion    • SSS (sick sinus syndrome) (CMS/[facility])    • URI, acute    • Venous insufficiency        Past Surgical History:   Procedure Laterality Date   • BILE DUCT STENT PLACEMENT      choledochal   • BREAST BIOPSY Left     benign   • CARDIAC CATHETERIZATION N/A 3/14/2020    Procedure: Coronary angiography;  Surgeon: Melquiades Root MD;  Location: [facility] CATH INVASIVE LOCATION;  Service: Cardiovascular;  Laterality: N/A;   • CARDIAC CATHETERIZATION  3/14/2020    Procedure: Percutaneous Manual Thrombectomy;  Surgeon: Melquiades Root MD;  Location: [facility] CATH INVASIVE LOCATION;  Service: Cardiovascular;;   • CARDIAC CATHETERIZATION N/A 3/14/2020    Procedure: Stent CAROLYNN coronary;  Surgeon: Melquiades Root MD;  Location: [facility] CATH INVASIVE LOCATION;  Service: Cardiovascular;  Laterality: N/A;   • CARDIAC CATHETERIZATION Right 7/9/2020    Procedure: Percutaneous Coronary Intervention;  Surgeon: Melquiades Root MD;  Location: [facility] CATH INVASIVE LOCATION;  Service: Cardiovascular;  Laterality: Right;  unprotected L main PCI.  8 Fr sheath in R CFA    • CARDIAC CATHETERIZATION N/A 7/9/2020    Procedure: Stent CAROLYNN coronary;  Surgeon: Melquiades Root MD;  Location: [facility] CATH INVASIVE LOCATION;  Service: Cardiovascular;  Laterality: N/A;   • CARDIAC CATHETERIZATION N/A 7/9/2020    Procedure: Atherectomy-coronary;  Surgeon: Melquiades Root MD;  Location: [facility] CATH INVASIVE LOCATION;  Service: Cardiovascular;  Laterality: N/A;   • CARDIAC ELECTROPHYSIOLOGY PROCEDURE N/A 9/14/2016    Procedure: Lead Revision PPM BOSTON;   Surgeon: Kale Méndez MD;  Location: [facility] INVASIVE LOCATION;  Service:    • CATARACT EXTRACTION W/ INTRAOCULAR LENS  IMPLANT, BILATERAL Bilateral    • CHOLECYSTECTOMY     • COLONOSCOPY  2012   • FACIAL COSMETIC SURGERY     • OTHER SURGICAL HISTORY      interrogation of implantable loop recorder remote, up to 30 days   • PACEMAKER IMPLANTATION     • SINUS SURGERY         Social History     Socioeconomic History   • Marital status:      Spouse name: Not on file   • Number of children: Not on file   • Years of education: Not on file   • Highest education level: Not on file   Occupational History   • Occupation: retired   Tobacco Use   • Smoking status: Former Smoker     Packs/day: 1.50     Start date:      Last attempt to quit:      Years since quittin.5   • Smokeless tobacco: Never Used   • Tobacco comment: CAFFEINE USE: 3-4 CUPS DECAF COFFEE DAILY   Substance and Sexual Activity   • Alcohol use: No   • Drug use: No   • Sexual activity: Defer     Comment:        Family History   Problem Relation Age of Onset   • Heart disease Mother    • Stroke Mother    • Heart disease Father    • Diabetes Sister    • Hodgkin's lymphoma Sister    • Heart disease Sister         pacemaker   • Heart disease Other    • Stroke Other    • Endometrial cancer Sister        Review of Systems   Constitution: Negative for diaphoresis and malaise/fatigue.   Cardiovascular: Negative for chest pain, claudication, dyspnea on exertion, irregular heartbeat, leg swelling, near-syncope, orthopnea, palpitations, paroxysmal nocturnal dyspnea and syncope.   Respiratory: Negative for cough, shortness of breath and sleep disturbances due to breathing.    Musculoskeletal: Negative for falls.   Neurological: Negative for dizziness and weakness.   Psychiatric/Behavioral: Negative for altered mental status and substance abuse.       Allergies   Allergen Reactions   • Carvedilol Other (See Comments)     MAKES HER VERY COLD    • Codeine Nausea And Vomiting   • Lisinopril Dizziness   • Penicillins Swelling   • Ranexa [Ranolazine Er] Headache   • Sulfa Antibiotics Nausea And Vomiting   • Tramadol Nausea And Vomiting         Current Outpatient Medications:   •  acetaminophen (TYLENOL) 325 MG tablet, Take 325 mg by mouth 2 (two) times a day as needed for mild pain (1-3)., Disp: , Rfl:   •  atorvastatin (LIPITOR) 40 MG tablet, Take 1 tablet by mouth Daily., Disp: 30 tablet, Rfl: 4  •  carbidopa-levodopa (SINEMET)  MG per tablet, Take 1 tablet by mouth Every Night., Disp: , Rfl: 11  •  Cholecalciferol (VITAMIN D3) 5000 UNITS capsule capsule, Take 5,000 Units by mouth As Needed., Disp: , Rfl:   •  clopidogrel (PLAVIX) 75 MG tablet, Take 1 tablet by mouth Daily., Disp: 30 tablet, Rfl: 11  •  fluticasone (FLONASE) 50 MCG/ACT nasal spray, 2 sprays into each nostril daily., Disp: , Rfl:   •  furosemide (LASIX) 40 MG tablet, TAKE 1 TABLET BY MOUTH EVERY DAY, Disp: 90 tablet, Rfl: 1  •  loratadine (CLARITIN) 10 MG tablet, Take 1 tablet by mouth daily., Disp: , Rfl:   •  losartan (Cozaar) 25 MG tablet, Take 1 tablet by mouth Daily., Disp: 30 tablet, Rfl: 11  •  nitroglycerin (NITROSTAT) 0.4 MG SL tablet, 1 under the tongue as needed for angina, may repeat q5mins for up three doses, Disp: 100 tablet, Rfl: 11  •  pantoprazole (Protonix) 40 MG EC tablet, Take 1 tablet by mouth Daily., Disp: 30 tablet, Rfl: 11  •  pyridoxine (B-6) 200 MG tablet, Take 1 tablet by mouth daily., Disp: , Rfl:   •  saccharomyces boulardii (FLORASTOR) 250 MG capsule, Take 1 capsule by mouth 2 (Two) Times a Day., Disp: , Rfl:   •  spironolactone (ALDACTONE) 25 MG tablet, TAKE 1 TABLET BY MOUTH EVERY DAY, Disp: 90 tablet, Rfl: 1  •  temazepam (RESTORIL) 7.5 MG capsule, 15 mg., Disp: , Rfl: 1  •  Umeclidinium-Vilanterol 62.5-25 MCG/INH aerosol powder , Inhale 1 puff Daily., Disp: , Rfl:   •  VENTOLIN  (90 BASE) MCG/ACT inhaler, INHALE 2 PUFFS BY MOUTH EVERY 4 HOURS  "AS NEEDED, Disp: , Rfl: 4  •  warfarin (COUMADIN) 2.5 MG tablet, Atrial fibrillation  Indications: Atrial Fibrillation, Disp: 30 tablet, Rfl: 3  •  warfarin (COUMADIN) 5 MG tablet, Atrial fibrillation  Indications: Atrial Fibrillation, Disp: 30 tablet, Rfl: 3      Objective:     Vitals:    07/24/20 1526   BP: 160/76   BP Location: Left arm   Patient Position: Sitting   Pulse: 80   Weight: 70.8 kg (156 lb)   Height: 156.2 cm (61.5\")     Body mass index is 29 kg/m².    PHYSICAL EXAM:    Physical Exam   Constitutional: She is oriented to person, place, and time. She appears well-developed and well-nourished. No distress.   HENT:   Head: Normocephalic.   Eyes: Pupils are equal, round, and reactive to light. EOM are normal.   Neck: Normal range of motion. Neck supple. No JVD present. Carotid bruit is not present. No edema present.   Cardiovascular: Normal rate, regular rhythm, S1 normal, S2 normal, normal heart sounds and intact distal pulses. Exam reveals no gallop.   No murmur heard.  Pulses:       Radial pulses are 2+ on the right side, and 2+ on the left side.        Dorsalis pedis pulses are 2+ on the right side, and 2+ on the left side.   Right groin soft no hematoma or thrill mild bruising   Pulmonary/Chest: Effort normal and breath sounds normal. No tachypnea. She has no wheezes. She has no rales.   Abdominal: Soft. Normal appearance and bowel sounds are normal. She exhibits no ascites. There is no tenderness.   Musculoskeletal: Normal range of motion. She exhibits no edema.   Neurological: She is alert and oriented to person, place, and time.   Skin: Skin is warm and dry.   Psychiatric: She has a normal mood and affect.         ECG 12 Lead  Date/Time: 7/24/2020 5:02 PM  Performed by: Munira Morrow APRN  Authorized by: Munira Morrow APRN   Comparison: compared with previous ECG   Rhythm: ventricular tachycardia  Rate: normal  BPM: 80  Other findings: left ventricular hypertrophy  Pacing: ventricular " paced rhythm            Assessment:       Diagnosis Plan   1. Acute systolic CHF (congestive heart failure) (CMS/HCC)  Basic Metabolic Panel   2. Permanent atrial fibrillation (CMS/HCC)     3. Cardiac pacemaker in situ     4. Essential hypertension     5. Pure hypercholesterolemia     6. Sick sinus syndrome (CMS/HCC)     7. Acute MI, inferior wall (CMS/HCC)     8. Coronary artery disease involving native coronary artery of native heart without angina pectoris     9. Cardiomyopathy, dilated (CMS/HCC)     10. S/P angioplasty with stent       Orders Placed This Encounter   Procedures   • Basic Metabolic Panel     Standing Status:   Future     Standing Expiration Date:   7/24/2021          Plan:       Ms. Stewart is really doing pretty good considering everything she has been through this year she is very active tolerating her medications.  I am going to start her on some losartan 25 mg a day for afterload reduction she did not tolerate beta-blockers well in the past they made her feel very tired and fatigued.  I will repeat some blood work in a week to make sure her potassium and creatinine are stable.  She has a follow-up with us in a month.       Your medication list           Accurate as of July 24, 2020  4:56 PM. If you have any questions, ask your nurse or doctor.               START taking these medications      Instructions Last Dose Given Next Dose Due   losartan 25 MG tablet  Commonly known as:  Cozaar  Started by:  RONEL Campbell      Take 1 tablet by mouth Daily.          CONTINUE taking these medications      Instructions Last Dose Given Next Dose Due   acetaminophen 325 MG tablet  Commonly known as:  TYLENOL      Take 325 mg by mouth 2 (two) times a day as needed for mild pain (1-3).       atorvastatin 40 MG tablet  Commonly known as:  LIPITOR      Take 1 tablet by mouth Daily.       carbidopa-levodopa  MG per tablet  Commonly known as:  SINEMET      Take 1 tablet by mouth Every Night.        clopidogrel 75 MG tablet  Commonly known as:  PLAVIX      Take 1 tablet by mouth Daily.       fluticasone 50 MCG/ACT nasal spray  Commonly known as:  FLONASE      2 sprays into each nostril daily.       furosemide 40 MG tablet  Commonly known as:  LASIX      TAKE 1 TABLET BY MOUTH EVERY DAY       loratadine 10 MG tablet  Commonly known as:  CLARITIN      Take 1 tablet by mouth daily.       nitroglycerin 0.4 MG SL tablet  Commonly known as:  NITROSTAT      1 under the tongue as needed for angina, may repeat q5mins for up three doses       pantoprazole 40 MG EC tablet  Commonly known as:  Protonix      Take 1 tablet by mouth Daily.       pyridoxine 200 MG tablet  Commonly known as:  VITAMIN B-6      Take 1 tablet by mouth daily.       saccharomyces boulardii 250 MG capsule  Commonly known as:  FLORASTOR      Take 1 capsule by mouth 2 (Two) Times a Day.       spironolactone 25 MG tablet  Commonly known as:  ALDACTONE      TAKE 1 TABLET BY MOUTH EVERY DAY       temazepam 7.5 MG capsule  Commonly known as:  RESTORIL      15 mg.       umeclidinium-vilanterol 62.5-25 MCG/INH aerosol powder  inhaler  Commonly known as:  ANORO ELLIPTA      Inhale 1 puff Daily.       Ventolin  (90 Base) MCG/ACT inhaler  Generic drug:  albuterol sulfate HFA      INHALE 2 PUFFS BY MOUTH EVERY 4 HOURS AS NEEDED       vitamin D3 125 MCG (5000 UT) capsule capsule      Take 5,000 Units by mouth As Needed.       warfarin 5 MG tablet  Commonly known as:  COUMADIN      Atrial fibrillation  Indications: Atrial Fibrillation       warfarin 2.5 MG tablet  Commonly known as:  COUMADIN      Atrial fibrillation  Indications: Atrial Fibrillation             Where to Get Your Medications      These medications were sent to [facility]/pharmacy #[id number] - [place], [place] - [address] [place] [address] - [phone]  - [phone] [address] [address]    Phone:  [phone]   · losartan 25 MG tablet           As always, it has been a pleasure to  participate in your patient's care.      Sincerely,     Munira RODNEY

## 2020-07-27 ENCOUNTER — TELEPHONE (OUTPATIENT)
Dept: CARDIOLOGY | Facility: CLINIC | Age: 85
End: 2020-07-27

## 2020-07-27 NOTE — TELEPHONE ENCOUNTER
8:47 am  [phone]    Pt L/M re: not feeling well after being prescribed Losartan 25 mg. Pt stated she took Losartan Saturday and Sunday and it gave her horrible headaches, BP dropped, and is having some lightheadedness. She stated she couldn't get anything done over the weekend. Pt stated she did not take Losartan today and is feeling fine.  Please advise of recommendations.    SOSA Troy

## 2020-07-27 NOTE — TELEPHONE ENCOUNTER
Pt advised of recommendations. I advised I would call her next wk for a BP Update. Pt verbalized all understanding.    SOSA Troy

## 2020-07-31 ENCOUNTER — TELEPHONE (OUTPATIENT)
Dept: CARDIOLOGY | Facility: CLINIC | Age: 85
End: 2020-07-31

## 2020-07-31 ENCOUNTER — LAB (OUTPATIENT)
Dept: LAB | Facility: HOSPITAL | Age: 85
End: 2020-07-31

## 2020-07-31 DIAGNOSIS — I50.21 ACUTE SYSTOLIC CHF (CONGESTIVE HEART FAILURE) (HCC): ICD-10-CM

## 2020-07-31 LAB
ANION GAP SERPL CALCULATED.3IONS-SCNC: 9.8 MMOL/L (ref 5–15)
BUN SERPL-MCNC: 18 MG/DL (ref 8–23)
BUN/CREAT SERPL: 16.7 (ref 7–25)
CALCIUM SPEC-SCNC: 10 MG/DL (ref 8.6–10.5)
CHLORIDE SERPL-SCNC: 100 MMOL/L (ref 98–107)
CO2 SERPL-SCNC: 24.2 MMOL/L (ref 22–29)
CREAT SERPL-MCNC: 1.08 MG/DL (ref 0.57–1)
GFR SERPL CREATININE-BSD FRML MDRD: 48 ML/MIN/1.73
GLUCOSE SERPL-MCNC: 89 MG/DL (ref 65–99)
POTASSIUM SERPL-SCNC: 4.3 MMOL/L (ref 3.5–5.2)
SODIUM SERPL-SCNC: 134 MMOL/L (ref 136–145)

## 2020-07-31 PROCEDURE — 80048 BASIC METABOLIC PNL TOTAL CA: CPT

## 2020-07-31 PROCEDURE — 36415 COLL VENOUS BLD VENIPUNCTURE: CPT

## 2020-07-31 NOTE — TELEPHONE ENCOUNTER
----- Message from RONEL Campbell sent at 7/31/2020  3:50 PM EDT -----  Let patient know that her labs are stable.

## 2020-08-03 ENCOUNTER — TREATMENT (OUTPATIENT)
Dept: CARDIAC REHAB | Facility: HOSPITAL | Age: 85
End: 2020-08-03

## 2020-08-03 DIAGNOSIS — I21.3 ST ELEVATION MYOCARDIAL INFARCTION (STEMI), UNSPECIFIED ARTERY (HCC): Primary | ICD-10-CM

## 2020-08-03 PROCEDURE — 93798 PHYS/QHP OP CAR RHAB W/ECG: CPT

## 2020-08-05 ENCOUNTER — TREATMENT (OUTPATIENT)
Dept: CARDIAC REHAB | Facility: HOSPITAL | Age: 85
End: 2020-08-05

## 2020-08-05 DIAGNOSIS — I21.3 ST ELEVATION MYOCARDIAL INFARCTION (STEMI), UNSPECIFIED ARTERY (HCC): Primary | ICD-10-CM

## 2020-08-05 PROCEDURE — 93798 PHYS/QHP OP CAR RHAB W/ECG: CPT

## 2020-08-07 ENCOUNTER — HOSPITAL ENCOUNTER (OUTPATIENT)
Dept: CARDIOLOGY | Facility: HOSPITAL | Age: 85
Discharge: HOME | End: 2020-08-07
Admitting: NURSE PRACTITIONER

## 2020-08-07 ENCOUNTER — TELEPHONE (OUTPATIENT)
Dept: CARDIOLOGY | Facility: CLINIC | Age: 85
End: 2020-08-07

## 2020-08-07 ENCOUNTER — OFFICE VISIT (OUTPATIENT)
Dept: CARDIOLOGY | Facility: CLINIC | Age: 85
End: 2020-08-07

## 2020-08-07 VITALS — SYSTOLIC BLOOD PRESSURE: 146 MMHG | OXYGEN SATURATION: 99 % | DIASTOLIC BLOOD PRESSURE: 81 MMHG | HEART RATE: 79 BPM

## 2020-08-07 VITALS
OXYGEN SATURATION: 98 % | WEIGHT: 159 LBS | SYSTOLIC BLOOD PRESSURE: 150 MMHG | HEIGHT: 62 IN | HEART RATE: 118 BPM | DIASTOLIC BLOOD PRESSURE: 98 MMHG | BODY MASS INDEX: 29.26 KG/M2

## 2020-08-07 DIAGNOSIS — I25.119 CORONARY ARTERY DISEASE INVOLVING NATIVE CORONARY ARTERY OF NATIVE HEART WITH ANGINA PECTORIS (HCC): ICD-10-CM

## 2020-08-07 DIAGNOSIS — Z79.01 WARFARIN ANTICOAGULATION: ICD-10-CM

## 2020-08-07 DIAGNOSIS — R06.09 DYSPNEA ON EXERTION: Primary | ICD-10-CM

## 2020-08-07 DIAGNOSIS — I48.21 PERMANENT ATRIAL FIBRILLATION (HCC): ICD-10-CM

## 2020-08-07 DIAGNOSIS — I49.5 SICK SINUS SYNDROME (HCC): ICD-10-CM

## 2020-08-07 DIAGNOSIS — I38 VALVULAR HEART DISEASE: ICD-10-CM

## 2020-08-07 DIAGNOSIS — D64.9 ANEMIA, UNSPECIFIED TYPE: ICD-10-CM

## 2020-08-07 DIAGNOSIS — R07.9 CHEST PAIN, UNSPECIFIED TYPE: Primary | ICD-10-CM

## 2020-08-07 DIAGNOSIS — Z95.820 S/P ANGIOPLASTY WITH STENT: ICD-10-CM

## 2020-08-07 DIAGNOSIS — I25.5 ISCHEMIC CARDIOMYOPATHY: ICD-10-CM

## 2020-08-07 DIAGNOSIS — Z95.0 CARDIAC PACEMAKER IN SITU: ICD-10-CM

## 2020-08-07 DIAGNOSIS — R06.00 DYSPNEA, UNSPECIFIED TYPE: ICD-10-CM

## 2020-08-07 DIAGNOSIS — I10 ESSENTIAL HYPERTENSION: ICD-10-CM

## 2020-08-07 LAB
ALBUMIN SERPL-MCNC: 3.9 G/DL (ref 3.5–5.2)
ALBUMIN/GLOB SERPL: 1.6 G/DL
ALP SERPL-CCNC: 47 U/L (ref 39–117)
ALT SERPL W P-5'-P-CCNC: 22 U/L (ref 1–33)
ANION GAP SERPL CALCULATED.3IONS-SCNC: 12.5 MMOL/L (ref 5–15)
AST SERPL-CCNC: 22 U/L (ref 1–32)
BASOPHILS # BLD AUTO: 0.06 10*3/MM3 (ref 0–0.2)
BASOPHILS NFR BLD AUTO: 1.3 % (ref 0–1.5)
BILIRUB SERPL-MCNC: 0.6 MG/DL (ref 0–1.2)
BUN SERPL-MCNC: 15 MG/DL (ref 8–23)
BUN/CREAT SERPL: 14.9 (ref 7–25)
CALCIUM SPEC-SCNC: 10.1 MG/DL (ref 8.6–10.5)
CHLORIDE SERPL-SCNC: 104 MMOL/L (ref 98–107)
CO2 SERPL-SCNC: 24.5 MMOL/L (ref 22–29)
CREAT SERPL-MCNC: 1.01 MG/DL (ref 0.57–1)
D DIMER PPP FEU-MCNC: 0.37 MCGFEU/ML (ref 0–0.49)
DEPRECATED RDW RBC AUTO: 42.4 FL (ref 37–54)
EOSINOPHIL # BLD AUTO: 0.05 10*3/MM3 (ref 0–0.4)
EOSINOPHIL NFR BLD AUTO: 1.1 % (ref 0.3–6.2)
ERYTHROCYTE [DISTWIDTH] IN BLOOD BY AUTOMATED COUNT: 13.6 % (ref 12.3–15.4)
GFR SERPL CREATININE-BSD FRML MDRD: 52 ML/MIN/1.73
GLOBULIN UR ELPH-MCNC: 2.5 GM/DL
GLUCOSE SERPL-MCNC: 91 MG/DL (ref 65–99)
HCT VFR BLD AUTO: 29 % (ref 34–46.6)
HGB BLD-MCNC: 9.5 G/DL (ref 12–15.9)
IMM GRANULOCYTES # BLD AUTO: 0.01 10*3/MM3 (ref 0–0.05)
IMM GRANULOCYTES NFR BLD AUTO: 0.2 % (ref 0–0.5)
LYMPHOCYTES # BLD AUTO: 1.04 10*3/MM3 (ref 0.7–3.1)
LYMPHOCYTES NFR BLD AUTO: 23.4 % (ref 19.6–45.3)
MCH RBC QN AUTO: 27.9 PG (ref 26.6–33)
MCHC RBC AUTO-ENTMCNC: 32.8 G/DL (ref 31.5–35.7)
MCV RBC AUTO: 85.3 FL (ref 79–97)
MONOCYTES # BLD AUTO: 0.44 10*3/MM3 (ref 0.1–0.9)
MONOCYTES NFR BLD AUTO: 9.9 % (ref 5–12)
NEUTROPHILS NFR BLD AUTO: 2.85 10*3/MM3 (ref 1.7–7)
NEUTROPHILS NFR BLD AUTO: 64.1 % (ref 42.7–76)
NRBC BLD AUTO-RTO: 0 /100 WBC (ref 0–0.2)
NT-PROBNP SERPL-MCNC: 1525 PG/ML (ref 0–1800)
PLATELET # BLD AUTO: 236 10*3/MM3 (ref 140–450)
PMV BLD AUTO: 10.7 FL (ref 6–12)
POTASSIUM SERPL-SCNC: 4 MMOL/L (ref 3.5–5.2)
PROT SERPL-MCNC: 6.4 G/DL (ref 6–8.5)
RBC # BLD AUTO: 3.4 10*6/MM3 (ref 3.77–5.28)
SODIUM SERPL-SCNC: 141 MMOL/L (ref 136–145)
TROPONIN T SERPL-MCNC: <0.01 NG/ML (ref 0–0.03)
WBC # BLD AUTO: 4.45 10*3/MM3 (ref 3.4–10.8)

## 2020-08-07 PROCEDURE — 80053 COMPREHEN METABOLIC PANEL: CPT | Performed by: NURSE PRACTITIONER

## 2020-08-07 PROCEDURE — 85025 COMPLETE CBC W/AUTO DIFF WBC: CPT | Performed by: NURSE PRACTITIONER

## 2020-08-07 PROCEDURE — 94760 N-INVAS EAR/PLS OXIMETRY 1: CPT

## 2020-08-07 PROCEDURE — 84484 ASSAY OF TROPONIN QUANT: CPT | Performed by: NURSE PRACTITIONER

## 2020-08-07 PROCEDURE — A9270 NON-COVERED ITEM OR SERVICE: HCPCS | Performed by: NURSE PRACTITIONER

## 2020-08-07 PROCEDURE — 93000 ELECTROCARDIOGRAM COMPLETE: CPT | Performed by: NURSE PRACTITIONER

## 2020-08-07 PROCEDURE — 83880 ASSAY OF NATRIURETIC PEPTIDE: CPT | Performed by: NURSE PRACTITIONER

## 2020-08-07 PROCEDURE — 63710000001 ALUMINUM-MAGNESIUM HYDROXIDE-SIMETHICONE 400-400-40 MG/5ML SUSPENSION: Performed by: NURSE PRACTITIONER

## 2020-08-07 PROCEDURE — 99215 OFFICE O/P EST HI 40 MIN: CPT | Performed by: NURSE PRACTITIONER

## 2020-08-07 PROCEDURE — 36415 COLL VENOUS BLD VENIPUNCTURE: CPT

## 2020-08-07 PROCEDURE — 85379 FIBRIN DEGRADATION QUANT: CPT | Performed by: NURSE PRACTITIONER

## 2020-08-07 RX ORDER — SODIUM CHLORIDE 0.9 % (FLUSH) 0.9 %
10 SYRINGE (ML) INJECTION AS NEEDED
Status: SHIPPED | OUTPATIENT
Start: 2020-08-07

## 2020-08-07 RX ORDER — MAGNESIUM HYDROXIDE/ALUMINUM HYDROXICE/SIMETHICONE 120; 1200; 1200 MG/30ML; MG/30ML; MG/30ML
30 SUSPENSION ORAL EVERY 6 HOURS PRN
Status: DISCONTINUED | OUTPATIENT
Start: 2020-08-07 | End: 2020-08-07

## 2020-08-07 RX ORDER — ALUMINA, MAGNESIA, AND SIMETHICONE 2400; 2400; 240 MG/30ML; MG/30ML; MG/30ML
30 SUSPENSION ORAL EVERY 6 HOURS PRN
Status: DISCONTINUED | OUTPATIENT
Start: 2020-08-07 | End: 2020-08-08 | Stop reason: HOSPADM

## 2020-08-07 RX ORDER — NITROGLYCERIN 0.4 MG/1
0.4 TABLET SUBLINGUAL
Status: DISCONTINUED | OUTPATIENT
Start: 2020-08-07 | End: 2021-10-20

## 2020-08-07 RX ADMIN — ALUMINUM HYDROXIDE, MAGNESIUM HYDROXIDE, AND DIMETHICONE 30 ML: 400; 400; 40 SUSPENSION ORAL at 14:20

## 2020-08-07 NOTE — TELEPHONE ENCOUNTER
Pt L/M re: having c/o SOA, especially when walking. Pt stated she has been using her rescue inhaler BID and she's never had to before. Pt stated when walking, she has to stop to take a break before she can keep going.  Please advise of recommendations.    SOSA Troy

## 2020-08-07 NOTE — PROGRESS NOTES
Date of Office Visit: 2020  Encounter Provider: RONEL Llamas  Primary Cardiologist: Dr. Root  Place of Service: [facility] CARDIOLOGY  Patient Name: Vicky Stewart  :10/18/1933      Subjective:     Chief Complaint:  Urgent visit dyspnea and chest pain.    History of Present Illness:  Vicky Stewart is a pleasant 86 y.o. female who is new to me .  Outside records have been requested and reviewed by me if available.     This is a patient of Dr. Root with many medical problems.  2020 she had a STEMI and underwent drug-eluting stent placement to mid RCA.  She had significant residual disease but was felt not to be a surgical candidate so medical management was recommended.  She did not tolerate medical intervention and had continued angina.  She had elective high risk angioplasty and stenting of the left main 2020 with rotational arthrectomy to the left main with crushing drug-eluting stent placement to the circumflex and LAD with 4 total drug-eluting stents.  She also has moderate to severe MR, moderate to severe tricuspid regurgitation and ischemic cardiomyopathy with a EF of 43% she has persistent atrial fibrillation on warfarin at home as well as mild to moderate aortic stenosis.  She has history of third-degree AV block status post pacemaker followed by Dr. Méndez.    2020 patient was recently in the office to see RONEL Oelary.  She was actually doing pretty well at home and was staying pretty active without chest discomfort or dyspnea.  Blood pressure is running a little bit elevated.  Apparently she did not tolerate beta-blockers in the past due to fatigue.  Losartan was started for afterload reduction.  1 month follow-up was recommended.    She had follow-up labs 2020 with mild hyponatremia and mild increased creatinine of 1.08 with a GFR 48.    She is presenting today for evaluation of dyspnea and chest pain.  Patient reports  "that since she saw Munira she has had dyspnea and breathlessness that is worse with exertion.  She had mild right ankle swelling the other day but no other significant edema.  She is maybe had a 2 pound weight gain in a few days.  She denies cough, wheezing, fevers, chills.  She has had constant midsternal chest pain since about 7 AM rated around 4/10.  Present on exam.  No relieving or exacerbating factors.  Not worse with palpation, deep breaths, cough, laying down, eating.  She did take 2 Tums today with no relief.  she had not tried nitroglycerin.  2 nitroglycerin were used in the office which did not seem to help the pain.  She had possibly a mild increase in pain with slight radiation into her left chest.  She had some transient nausea.  When she initially arrived her blood pressure was quite elevated 180/100.  She reports this is not been the case at home.  Home health is even checked her blood pressure and the highest reading she has had was in the 140s/70s.  She reports that she took the losartan for 2 days and felt increased lightheadedness and dizziness and her blood pressure was 101 systolic so she stopped taking it 2 days after starting.  Since then her blood pressure has been reportedly good 1 teens to 140s.  She denies any PND, orthopnea, palpitations, syncope, near syncope.  She denies any bleeding issues or missed doses of any of her medical therapy.  She does note that she has been very tired and fatigued at rehab and \"a 1-year-old could do more than she has been able to do\".  Her EKG is not helpful for ischemic changes given her pacemaker.  She reports she was told she had a history of COPD in the past.  She has been taking inhalers prophylactically which occasionally provides some improvement though not long-lasting.  She has not been coughing or wheezing.  She does not have a pulmonologist.      Past Medical History:   Diagnosis Date   • Acute MI, inferior wall (CMS/[facility]) 03/2020   • Anemia    • " Atrial fibrillation (CMS/[facility])    • Atrial flutter (CMS/[facility])    • AV block, 3rd degree (CMS/[facility])    • Carotid artery stenosis    • Chronic systolic heart failure (CMS/[facility])    • Dermatitis    • GERD (gastroesophageal reflux disease)    • History of EKG 06/17/2016   • Hyperlipidemia    • Hypertension    • Ischemic cardiomyopathy    • Low back pain    • Osteoarthritis     knee   • Osteoporosis    • Pacemaker    • Palpitations    • Persistent atrial fibrillation (CMS/[facility])     on warfarin   • Raynaud's phenomenon    • SOB (shortness of breath) on exertion    • SSS (sick sinus syndrome) (CMS/[facility])    • URI, acute    • Venous insufficiency      Past Surgical History:   Procedure Laterality Date   • BILE DUCT STENT PLACEMENT      choledochal   • BREAST BIOPSY Left     benign   • CARDIAC CATHETERIZATION N/A 3/14/2020    Procedure: Coronary angiography;  Surgeon: Melquiades Root MD;  Location: [facility] CATH INVASIVE LOCATION;  Service: Cardiovascular;  Laterality: N/A;   • CARDIAC CATHETERIZATION  3/14/2020    Procedure: Percutaneous Manual Thrombectomy;  Surgeon: Melquiades Root MD;  Location: [facility] CATH INVASIVE LOCATION;  Service: Cardiovascular;;   • CARDIAC CATHETERIZATION N/A 3/14/2020    Procedure: Stent CAROLYNN coronary;  Surgeon: Melquiades Root MD;  Location: [facility] CATH INVASIVE LOCATION;  Service: Cardiovascular;  Laterality: N/A;   • CARDIAC CATHETERIZATION Right 7/9/2020    Procedure: Percutaneous Coronary Intervention;  Surgeon: Melquiades Root MD;  Location: [facility] CATH INVASIVE LOCATION;  Service: Cardiovascular;  Laterality: Right;  unprotected L main PCI.  8 Fr sheath in R CFA    • CARDIAC CATHETERIZATION N/A 7/9/2020    Procedure: Stent CAROLYNN coronary;  Surgeon: Melquiades Root MD;  Location: [facility] CATH INVASIVE LOCATION;  Service: Cardiovascular;  Laterality: N/A;   • CARDIAC CATHETERIZATION N/A 7/9/2020    Procedure: Atherectomy-coronary;  Surgeon: Melquiades Root MD;  Location: [facility] CATH INVASIVE  LOCATION;  Service: Cardiovascular;  Laterality: N/A;   • CARDIAC ELECTROPHYSIOLOGY PROCEDURE N/A 9/14/2016    Procedure: Lead Revision PPM [place];  Surgeon: Kale Méndez MD;  Location: [facility] INVASIVE LOCATION;  Service:    • CATARACT EXTRACTION W/ INTRAOCULAR LENS  IMPLANT, BILATERAL Bilateral    • CHOLECYSTECTOMY     • COLONOSCOPY  09/2012   • FACIAL COSMETIC SURGERY     • OTHER SURGICAL HISTORY      interrogation of implantable loop recorder remote, up to 30 days   • PACEMAKER IMPLANTATION     • SINUS SURGERY       Outpatient Medications Prior to Visit   Medication Sig Dispense Refill   • acetaminophen (TYLENOL) 325 MG tablet Take 325 mg by mouth 2 (two) times a day as needed for mild pain (1-3).     • atorvastatin (LIPITOR) 40 MG tablet Take 1 tablet by mouth Daily. 30 tablet 4   • carbidopa-levodopa (SINEMET)  MG per tablet Take 1 tablet by mouth Every Night.  11   • Cholecalciferol (VITAMIN D3) 5000 UNITS capsule capsule Take 5,000 Units by mouth As Needed.     • clopidogrel (PLAVIX) 75 MG tablet Take 1 tablet by mouth Daily. 30 tablet 11   • fluticasone (FLONASE) 50 MCG/ACT nasal spray 2 sprays into each nostril daily.     • furosemide (LASIX) 40 MG tablet TAKE 1 TABLET BY MOUTH EVERY DAY 90 tablet 1   • loratadine (CLARITIN) 10 MG tablet Take 1 tablet by mouth daily.     • nitroglycerin (NITROSTAT) 0.4 MG SL tablet 1 under the tongue as needed for angina, may repeat q5mins for up three doses 100 tablet 11   • pantoprazole (Protonix) 40 MG EC tablet Take 1 tablet by mouth Daily. 30 tablet 11   • pyridoxine (B-6) 200 MG tablet Take 1 tablet by mouth daily.     • saccharomyces boulardii (FLORASTOR) 250 MG capsule Take 1 capsule by mouth 2 (Two) Times a Day.     • spironolactone (ALDACTONE) 25 MG tablet TAKE 1 TABLET BY MOUTH EVERY DAY 90 tablet 1   • temazepam (RESTORIL) 7.5 MG capsule 15 mg.  1   • Umeclidinium-Vilanterol 62.5-25 MCG/INH aerosol powder  Inhale 1 puff Daily.     • VENTOLIN   (90 BASE) MCG/ACT inhaler INHALE 2 PUFFS BY MOUTH EVERY 4 HOURS AS NEEDED  4   • warfarin (COUMADIN) 2.5 MG tablet Atrial fibrillation  Indications: Atrial Fibrillation 30 tablet 3   • warfarin (COUMADIN) 5 MG tablet Atrial fibrillation  Indications: Atrial Fibrillation 30 tablet 3   • losartan (Cozaar) 25 MG tablet Take 1 tablet by mouth Daily. 30 tablet 11     No facility-administered medications prior to visit.        Allergies as of 2020 - Reviewed 2020   Allergen Reaction Noted   • Carvedilol Other (See Comments) 2020   • Codeine Nausea And Vomiting 2016   • Lisinopril Dizziness 2020   • Penicillins Swelling 2016   • Ranexa [ranolazine er] Headache 2020   • Sulfa antibiotics Nausea And Vomiting 2016   • Tramadol Nausea And Vomiting 2017     Social History     Socioeconomic History   • Marital status:      Spouse name: Not on file   • Number of children: Not on file   • Years of education: Not on file   • Highest education level: Not on file   Occupational History   • Occupation: retired   Tobacco Use   • Smoking status: Former Smoker     Packs/day: 1.50     Start date:      Last attempt to quit:      Years since quittin.6   • Smokeless tobacco: Never Used   • Tobacco comment: CAFFEINE USE: 3-4 CUPS DECAF COFFEE DAILY   Substance and Sexual Activity   • Alcohol use: No   • Drug use: No   • Sexual activity: Defer     Comment:      Family History   Problem Relation Age of Onset   • Heart disease Mother    • Stroke Mother    • Heart disease Father    • Diabetes Sister    • Hodgkin's lymphoma Sister    • Heart disease Sister         pacemaker   • Heart disease Other    • Stroke Other    • Endometrial cancer Sister      Review of Systems   Constitution: Positive for weight gain (1-2 lbs in 2 days). Negative for chills, fever and malaise/fatigue.   HENT: Negative for ear pain, hearing loss, nosebleeds and sore throat.    Eyes: Negative for  "double vision, pain, vision loss in left eye and vision loss in right eye.   Cardiovascular: Positive for chest pain, dyspnea on exertion and leg swelling (right leg swelling). Negative for claudication, irregular heartbeat, near-syncope, orthopnea, palpitations, paroxysmal nocturnal dyspnea and syncope.   Respiratory: Positive for shortness of breath. Negative for cough, snoring and wheezing.    Endocrine: Negative for cold intolerance and heat intolerance.   Hematologic/Lymphatic: Negative for bleeding problem.   Skin: Negative for color change, itching, rash and unusual hair distribution.   Musculoskeletal: Negative for joint pain and joint swelling.   Gastrointestinal: Negative for abdominal pain, diarrhea, hematochezia, melena, nausea and vomiting.   Genitourinary: Negative for decreased libido, frequency, hematuria, hesitancy and incomplete emptying.   Neurological: Negative for excessive daytime sleepiness, dizziness, headaches, light-headedness, loss of balance, numbness, paresthesias and seizures.   Psychiatric/Behavioral: Negative for depression.          Objective:     Vitals:    08/07/20 0116 08/07/20 1232 08/07/20 1301 08/07/20 1330   BP: 132/98 180/100 132/92 150/98   BP Location:  Left arm Left arm    Pulse:  82  118   SpO2:  100%  98%   Weight:  72.1 kg (159 lb)     Height:  157.5 cm (62\")       Body mass index is 29.08 kg/m².    PHYSICAL EXAM:  Physical Exam   Constitutional: She is oriented to person, place, and time. She appears well-developed and well-nourished. No distress.   HENT:   Head: Normocephalic and atraumatic.   Eyes: Pupils are equal, round, and reactive to light. Conjunctivae and EOM are normal.   Wearing glasses   Neck: No JVD present.   Cardiovascular: Normal rate, regular rhythm, normal heart sounds and intact distal pulses.   No murmur heard.  Pulses:       Radial pulses are 2+ on the right side, and 2+ on the left side.        Posterior tibial pulses are 2+ on the right side, and " 2+ on the left side.   Venous vericosities   Pulmonary/Chest: Effort normal and breath sounds normal. No accessory muscle usage. No tachypnea. No respiratory distress. She has no decreased breath sounds. She has no wheezes. She has no rhonchi. She has no rales. She exhibits no tenderness.   Somewhat conversationally dyspneic   Abdominal: Soft. Bowel sounds are normal. She exhibits no distension. There is no tenderness. There is no rebound and no guarding.   Neurological: She is alert and oriented to person, place, and time.   Skin: Skin is warm, dry and intact. She is not diaphoretic. No erythema.   Psychiatric: Her speech is normal and behavior is normal. Judgment and thought content normal. Her mood appears anxious. Cognition and memory are normal.   Somewhat shaky and anxious   Nursing note and vitals reviewed.        ECG 12 Lead  Date/Time: 8/7/2020 12:35 PM  Performed by: Quita Mahoney APRN  Authorized by: Quita Mahoney APRN   Comparison: compared with previous ECG from 7/24/2020  Similar to previous ECG  Comparison to previous ECG: Reviewed current and prior ECGs with Dr. Root  Rhythm: atrial fibrillation and paced  Rate: normal  BPM: 80  Pacing: ventricular paced rhythm  Clinical impression: abnormal EKG  Comments: Indication: CAD, chest pain, dyspnea, cardiomyopathy, valvular disease, AF with PPM                Assessment:       Diagnosis Plan   1. Dyspnea on exertion  ECG 12 Lead    Troponin T    CBC (No Diff)    Basic Metabolic Panel   2. Coronary artery disease involving native coronary artery of native heart with angina pectoris (CMS/HCC)  ECG 12 Lead    Troponin T    CBC (No Diff)    Basic Metabolic Panel   3. S/P angioplasty with stent  ECG 12 Lead    Troponin T    CBC (No Diff)    Basic Metabolic Panel   4. Ischemic cardiomyopathy  ECG 12 Lead    Troponin T    CBC (No Diff)    Basic Metabolic Panel   5. Valvular heart disease  ECG 12 Lead    Troponin T    CBC (No Diff)    Basic Metabolic Panel    6. Essential hypertension  ECG 12 Lead    Troponin T    CBC (No Diff)    Basic Metabolic Panel   7. Sick sinus syndrome (CMS/HCC)  ECG 12 Lead    Troponin T    CBC (No Diff)    Basic Metabolic Panel   8. Cardiac pacemaker in situ  ECG 12 Lead    Troponin T    CBC (No Diff)    Basic Metabolic Panel   9. Permanent atrial fibrillation (CMS/HCC)  ECG 12 Lead    Troponin T    CBC (No Diff)    Basic Metabolic Panel   10. Warfarin anticoagulation  ECG 12 Lead    Troponin T    CBC (No Diff)    Basic Metabolic Panel   11. Anemia, unspecified type         Plan:     1. Chest pain/dyspnea  2. CAD: With recent high risk stenting of the left main into LAD and circumflex with 4 total stents 7/9/2020.  She had a STEMI with stenting to the RCA in March 2020.  Is on Plavix alone and she is on warfarin for her atrial fibrillation.  Has listed intolerance to Ranexa, lisinopril and carvedilol  3. Systolic heart failure: She has an ischemic cardiomyopathy EF 43%, she is on GDM T with spironolactone and losartan as well as Lasix.  She has not tolerated beta-blocker in the past.  4. Hypertension: BP was high at last visit and Munira added losartan.  She had some minor renal insufficiency on follow-up labs.  5. Sick sinus syndrome: Status post permanent pacemaker followed by Dr. Méndez.  Due for follow-up with Dr. Méndez which is scheduled.  Defer.  6. Permanent atrial fibrillation: Anticoagulated on warfarin.  7. Hyperlipidemia: Well-controlled on statin therapy  8.  Mild anemia  9. GERD    Unfortunately this lady has been intolerant to numerous medications-Ranexa caused problems, Imdur I believe may have caused headaches, both lisinopril and losartan caused dizziness, carvedilol beta-blockers caused severe fatigue..  Her pain did not seem to respond to sublingual nitroglycerin in the office nor GI cocktail.  Her troponin was negative, proBNP was within range, BMP showed mild renal insufficiency creatinine 1.01 with a GFR 52, normal  electrolytes.  CBC showed slight progression of anemia H&H 9.5/29.0, RBCs 3.40, MCV normal, the rest of CBC within normal limits, d-dimer normal.  I discussed with Dr. Root and Dr. Root who is also evaluated the patient-unclear source of her pain but appears her recent stents may be okay.  She can go home and try some Tylenol, ice, heat and rest over the weekend.  She will update us with her symptoms.  She will go to the ED if symptoms worsen.  She is going to call her PCP for an evaluation of her anemia and monitor for signs of bleeding.  She will call us with an update next week.  She will keep an eye on her blood pressure.  It was high here in the office today though could be pain or anxiety driven.  It did improve to better range 146/81 before leaving.  She reports good BP control at home.  We will have her make sure she checks her BP cuff for accuracy.  She sees Dr. Martinez before office again she can take at his office.  She was stable and felt comfortable driving home today.         It has been a pleasure to participate in this patient's care. Please feel free to contact me with any questions or concerns.     Quita Mahoney, RONEL  08/07/2020             Your medication list           Accurate as of August 7, 2020  3:54 PM. If you have any questions, ask your nurse or doctor.               CONTINUE taking these medications      Instructions Last Dose Given Next Dose Due   acetaminophen 325 MG tablet  Commonly known as:  TYLENOL      Take 325 mg by mouth 2 (two) times a day as needed for mild pain (1-3).       atorvastatin 40 MG tablet  Commonly known as:  LIPITOR      Take 1 tablet by mouth Daily.       carbidopa-levodopa  MG per tablet  Commonly known as:  SINEMET      Take 1 tablet by mouth Every Night.       clopidogrel 75 MG tablet  Commonly known as:  PLAVIX      Take 1 tablet by mouth Daily.       fluticasone 50 MCG/ACT nasal spray  Commonly known as:  FLONASE      2 sprays into each nostril  daily.       furosemide 40 MG tablet  Commonly known as:  LASIX      TAKE 1 TABLET BY MOUTH EVERY DAY       loratadine 10 MG tablet  Commonly known as:  CLARITIN      Take 1 tablet by mouth daily.       nitroglycerin 0.4 MG SL tablet  Commonly known as:  NITROSTAT      1 under the tongue as needed for angina, may repeat q5mins for up three doses       pantoprazole 40 MG EC tablet  Commonly known as:  Protonix      Take 1 tablet by mouth Daily.       pyridoxine 200 MG tablet  Commonly known as:  VITAMIN B-6      Take 1 tablet by mouth daily.       saccharomyces boulardii 250 MG capsule  Commonly known as:  FLORASTOR      Take 1 capsule by mouth 2 (Two) Times a Day.       spironolactone 25 MG tablet  Commonly known as:  ALDACTONE      TAKE 1 TABLET BY MOUTH EVERY DAY       temazepam 7.5 MG capsule  Commonly known as:  RESTORIL      15 mg.       umeclidinium-vilanterol 62.5-25 MCG/INH aerosol powder  inhaler  Commonly known as:  ANORO ELLIPTA      Inhale 1 puff Daily.       Ventolin  (90 Base) MCG/ACT inhaler  Generic drug:  albuterol sulfate HFA      INHALE 2 PUFFS BY MOUTH EVERY 4 HOURS AS NEEDED       vitamin D3 125 MCG (5000 UT) capsule capsule      Take 5,000 Units by mouth As Needed.       warfarin 5 MG tablet  Commonly known as:  COUMADIN      Atrial fibrillation  Indications: Atrial Fibrillation       warfarin 2.5 MG tablet  Commonly known as:  COUMADIN      Atrial fibrillation  Indications: Atrial Fibrillation          STOP taking these medications    losartan 25 MG tablet  Commonly known as:  Cozaar  Stopped by:  RONEL Llamas               The above medication changes may not have been made by this provider.  Medication list was updated to reflect medications patient is currently taking including medication changes and discontinuations made by other healthcare providers or the patients themselves.     Dictated utilizing Dragon Dictation System.

## 2020-08-07 NOTE — TELEPHONE ENCOUNTER
Pt advised and is on her way to the office. Pt scheduled to see RONEL Devlni today @ 1 pm per SOSA Beatty.    SOSA Troy

## 2020-08-07 NOTE — PROGRESS NOTES
Reviewed results with Dr. Root and patient in [facility].  Her chest pain is atypical and troponin is negative as well as d-dimer, renal function stable, proBNP not elevated not changed from last reading.  Recommend she follow-up with her PCP for evaluation of anemia which could be contributing to shortness of breath and fatigue.  She will monitor for bleeding but denies any at this point in time.  She can try some Tylenol, ice, heat.  If her symptoms progress she needs to go to the ED over the weekend.  Otherwise she will give us an update on how she is doing next week.  If she continues to be short of breath may check a chest x-ray.

## 2020-08-07 NOTE — TELEPHONE ENCOUNTER
Rogerio-can you please fax a copy of my office note today and patient's lab work from today to her PCP ? Thanks

## 2020-08-10 ENCOUNTER — TREATMENT (OUTPATIENT)
Dept: CARDIAC REHAB | Facility: HOSPITAL | Age: 85
End: 2020-08-10

## 2020-08-10 DIAGNOSIS — I21.3 ST ELEVATION MYOCARDIAL INFARCTION (STEMI), UNSPECIFIED ARTERY (HCC): Primary | ICD-10-CM

## 2020-08-10 PROCEDURE — 93798 PHYS/QHP OP CAR RHAB W/ECG: CPT

## 2020-08-12 ENCOUNTER — TREATMENT (OUTPATIENT)
Dept: CARDIAC REHAB | Facility: HOSPITAL | Age: 85
End: 2020-08-12

## 2020-08-12 DIAGNOSIS — I21.3 ST ELEVATION MYOCARDIAL INFARCTION (STEMI), UNSPECIFIED ARTERY (HCC): Primary | ICD-10-CM

## 2020-08-12 PROCEDURE — 93798 PHYS/QHP OP CAR RHAB W/ECG: CPT

## 2020-08-14 ENCOUNTER — APPOINTMENT (OUTPATIENT)
Dept: CARDIAC REHAB | Facility: HOSPITAL | Age: 85
End: 2020-08-14

## 2020-08-17 ENCOUNTER — APPOINTMENT (OUTPATIENT)
Dept: CARDIAC REHAB | Facility: HOSPITAL | Age: 85
End: 2020-08-17

## 2020-08-19 ENCOUNTER — APPOINTMENT (OUTPATIENT)
Dept: CARDIAC REHAB | Facility: HOSPITAL | Age: 85
End: 2020-08-19

## 2020-08-21 ENCOUNTER — APPOINTMENT (OUTPATIENT)
Dept: CARDIAC REHAB | Facility: HOSPITAL | Age: 85
End: 2020-08-21

## 2020-08-24 ENCOUNTER — APPOINTMENT (OUTPATIENT)
Dept: CARDIAC REHAB | Facility: HOSPITAL | Age: 85
End: 2020-08-24

## 2020-08-26 ENCOUNTER — APPOINTMENT (OUTPATIENT)
Dept: CARDIAC REHAB | Facility: HOSPITAL | Age: 85
End: 2020-08-26

## 2020-08-28 ENCOUNTER — APPOINTMENT (OUTPATIENT)
Dept: CARDIAC REHAB | Facility: HOSPITAL | Age: 85
End: 2020-08-28

## 2020-08-31 ENCOUNTER — APPOINTMENT (OUTPATIENT)
Dept: CARDIAC REHAB | Facility: HOSPITAL | Age: 85
End: 2020-08-31

## 2020-09-01 ENCOUNTER — TELEPHONE (OUTPATIENT)
Dept: CARDIOLOGY | Facility: CLINIC | Age: 85
End: 2020-09-01

## 2020-09-01 NOTE — TELEPHONE ENCOUNTER
Pt L/M re: wanting to know if she needed to keep both appts. Pt sees WD 9/15/20 and Pacer/JM 9/16/20. Are both appts necessary to keep.  Please advise of recommendations.    SOSA Troy

## 2020-09-02 ENCOUNTER — APPOINTMENT (OUTPATIENT)
Dept: CARDIAC REHAB | Facility: HOSPITAL | Age: 85
End: 2020-09-02

## 2020-09-02 NOTE — TELEPHONE ENCOUNTER
I would say she needs to keep appt with Dr. Root on 9/15. See if pacemaker can move up her appt to same day as she is in to see Dr. Root.

## 2020-09-02 NOTE — TELEPHONE ENCOUNTER
DONE!! Dr. Méndez Appt cancelled til further notice pending appt w/ Dr. Root. Pacer check rescheduled to same day as Dk Appt. Pt advised. Pt verbalized appreciation.    SOSA Troy

## 2020-09-04 ENCOUNTER — APPOINTMENT (OUTPATIENT)
Dept: CARDIAC REHAB | Facility: HOSPITAL | Age: 85
End: 2020-09-04

## 2020-09-08 RX ORDER — FUROSEMIDE 40 MG/1
TABLET ORAL
Qty: 90 TABLET | Refills: 1 | Status: SHIPPED | OUTPATIENT
Start: 2020-09-08 | End: 2021-03-17

## 2020-09-09 ENCOUNTER — APPOINTMENT (OUTPATIENT)
Dept: CARDIAC REHAB | Facility: HOSPITAL | Age: 85
End: 2020-09-09

## 2020-09-09 RX ORDER — CARVEDILOL 3.12 MG/1
TABLET ORAL
Qty: 180 TABLET | Refills: 2 | OUTPATIENT
Start: 2020-09-09

## 2020-09-11 ENCOUNTER — APPOINTMENT (OUTPATIENT)
Dept: CARDIAC REHAB | Facility: HOSPITAL | Age: 85
End: 2020-09-11

## 2020-09-14 ENCOUNTER — APPOINTMENT (OUTPATIENT)
Dept: CARDIAC REHAB | Facility: HOSPITAL | Age: 85
End: 2020-09-14

## 2020-09-16 ENCOUNTER — APPOINTMENT (OUTPATIENT)
Dept: CARDIAC REHAB | Facility: HOSPITAL | Age: 85
End: 2020-09-16

## 2020-09-17 ENCOUNTER — CLINICAL SUPPORT NO REQUIREMENTS (OUTPATIENT)
Dept: CARDIOLOGY | Facility: CLINIC | Age: 85
End: 2020-09-17

## 2020-09-17 ENCOUNTER — OFFICE VISIT (OUTPATIENT)
Dept: CARDIOLOGY | Facility: CLINIC | Age: 85
End: 2020-09-17

## 2020-09-17 ENCOUNTER — HOSPITAL ENCOUNTER (OUTPATIENT)
Dept: GENERAL RADIOLOGY | Facility: HOSPITAL | Age: 85
Discharge: HOME | End: 2020-09-17

## 2020-09-17 ENCOUNTER — LAB (OUTPATIENT)
Dept: LAB | Facility: HOSPITAL | Age: 85
End: 2020-09-17

## 2020-09-17 VITALS
HEIGHT: 62 IN | RESPIRATION RATE: 16 BRPM | WEIGHT: 160 LBS | DIASTOLIC BLOOD PRESSURE: 86 MMHG | HEART RATE: 80 BPM | SYSTOLIC BLOOD PRESSURE: 148 MMHG | BODY MASS INDEX: 29.44 KG/M2 | OXYGEN SATURATION: 98 %

## 2020-09-17 DIAGNOSIS — R06.09 DYSPNEA ON EXERTION: ICD-10-CM

## 2020-09-17 DIAGNOSIS — I25.119 CORONARY ARTERY DISEASE INVOLVING NATIVE CORONARY ARTERY OF NATIVE HEART WITH ANGINA PECTORIS (HCC): ICD-10-CM

## 2020-09-17 DIAGNOSIS — I10 ESSENTIAL HYPERTENSION: ICD-10-CM

## 2020-09-17 DIAGNOSIS — I44.2 THIRD DEGREE AV BLOCK (HCC): Primary | ICD-10-CM

## 2020-09-17 DIAGNOSIS — Z95.0 CARDIAC PACEMAKER IN SITU: Primary | ICD-10-CM

## 2020-09-17 DIAGNOSIS — I42.0 CARDIOMYOPATHY, DILATED (HCC): ICD-10-CM

## 2020-09-17 LAB
ALBUMIN SERPL-MCNC: 4.4 G/DL (ref 3.5–5.2)
ALBUMIN/GLOB SERPL: 1.5 G/DL
ALP SERPL-CCNC: 63 U/L (ref 39–117)
ALT SERPL W P-5'-P-CCNC: 23 U/L (ref 1–33)
ANION GAP SERPL CALCULATED.3IONS-SCNC: 11.3 MMOL/L (ref 5–15)
AST SERPL-CCNC: 23 U/L (ref 1–32)
BASOPHILS # BLD AUTO: 0.04 10*3/MM3 (ref 0–0.2)
BASOPHILS NFR BLD AUTO: 0.8 % (ref 0–1.5)
BILIRUB SERPL-MCNC: 0.5 MG/DL (ref 0–1.2)
BUN SERPL-MCNC: 20 MG/DL (ref 8–23)
BUN/CREAT SERPL: 21.3 (ref 7–25)
CALCIUM SPEC-SCNC: 10.3 MG/DL (ref 8.6–10.5)
CHLORIDE SERPL-SCNC: 103 MMOL/L (ref 98–107)
CO2 SERPL-SCNC: 24.7 MMOL/L (ref 22–29)
CREAT SERPL-MCNC: 0.94 MG/DL (ref 0.57–1)
DEPRECATED RDW RBC AUTO: 47.4 FL (ref 37–54)
EOSINOPHIL # BLD AUTO: 0.06 10*3/MM3 (ref 0–0.4)
EOSINOPHIL NFR BLD AUTO: 1.2 % (ref 0.3–6.2)
ERYTHROCYTE [DISTWIDTH] IN BLOOD BY AUTOMATED COUNT: 15 % (ref 12.3–15.4)
GFR SERPL CREATININE-BSD FRML MDRD: 56 ML/MIN/1.73
GLOBULIN UR ELPH-MCNC: 2.9 GM/DL
GLUCOSE SERPL-MCNC: 93 MG/DL (ref 65–99)
HCT VFR BLD AUTO: 34.5 % (ref 34–46.6)
HGB BLD-MCNC: 11.4 G/DL (ref 12–15.9)
IMM GRANULOCYTES # BLD AUTO: 0.02 10*3/MM3 (ref 0–0.05)
IMM GRANULOCYTES NFR BLD AUTO: 0.4 % (ref 0–0.5)
INR PPP: 2.73 (ref 0.9–1.1)
LYMPHOCYTES # BLD AUTO: 1.18 10*3/MM3 (ref 0.7–3.1)
LYMPHOCYTES NFR BLD AUTO: 23 % (ref 19.6–45.3)
MCH RBC QN AUTO: 28.4 PG (ref 26.6–33)
MCHC RBC AUTO-ENTMCNC: 33 G/DL (ref 31.5–35.7)
MCV RBC AUTO: 86 FL (ref 79–97)
MONOCYTES # BLD AUTO: 0.49 10*3/MM3 (ref 0.1–0.9)
MONOCYTES NFR BLD AUTO: 9.5 % (ref 5–12)
NEUTROPHILS NFR BLD AUTO: 3.35 10*3/MM3 (ref 1.7–7)
NEUTROPHILS NFR BLD AUTO: 65.1 % (ref 42.7–76)
NRBC BLD AUTO-RTO: 0 /100 WBC (ref 0–0.2)
NT-PROBNP SERPL-MCNC: 779 PG/ML (ref 0–1800)
PLATELET # BLD AUTO: 255 10*3/MM3 (ref 140–450)
PMV BLD AUTO: 11.3 FL (ref 6–12)
POTASSIUM SERPL-SCNC: 4.2 MMOL/L (ref 3.5–5.2)
PROT SERPL-MCNC: 7.3 G/DL (ref 6–8.5)
PROTHROMBIN TIME: 28.1 SECONDS (ref 11.7–14.2)
RBC # BLD AUTO: 4.01 10*6/MM3 (ref 3.77–5.28)
SODIUM SERPL-SCNC: 139 MMOL/L (ref 136–145)
WBC # BLD AUTO: 5.14 10*3/MM3 (ref 3.4–10.8)

## 2020-09-17 PROCEDURE — 85025 COMPLETE CBC W/AUTO DIFF WBC: CPT | Performed by: INTERNAL MEDICINE

## 2020-09-17 PROCEDURE — 93000 ELECTROCARDIOGRAM COMPLETE: CPT | Performed by: INTERNAL MEDICINE

## 2020-09-17 PROCEDURE — 85610 PROTHROMBIN TIME: CPT | Performed by: INTERNAL MEDICINE

## 2020-09-17 PROCEDURE — 83880 ASSAY OF NATRIURETIC PEPTIDE: CPT | Performed by: INTERNAL MEDICINE

## 2020-09-17 PROCEDURE — 71046 X-RAY EXAM CHEST 2 VIEWS: CPT

## 2020-09-17 PROCEDURE — 99214 OFFICE O/P EST MOD 30 MIN: CPT | Performed by: INTERNAL MEDICINE

## 2020-09-17 PROCEDURE — 36415 COLL VENOUS BLD VENIPUNCTURE: CPT | Performed by: INTERNAL MEDICINE

## 2020-09-17 PROCEDURE — 93279 PRGRMG DEV EVAL PM/LDLS PM: CPT | Performed by: INTERNAL MEDICINE

## 2020-09-17 PROCEDURE — 80053 COMPREHEN METABOLIC PANEL: CPT | Performed by: INTERNAL MEDICINE

## 2020-09-17 RX ORDER — DOXYCYCLINE HYCLATE 50 MG/1
324 CAPSULE, GELATIN COATED ORAL
COMMUNITY
End: 2022-08-05 | Stop reason: SINTOL

## 2020-09-17 NOTE — PROGRESS NOTES
Date of Office Visit: 20  Encounter Provider: Melquiades Root MD  Place of Service: [facility]  Patient Name: Vicky Stewart  :10/18/1933  [id number]    Chief Complaint   Patient presents with   • Congestive Heart Failure   :     HPI: Vicky Stewart is a 86 y.o. female  who had inferolateral myocardial infarction in 2020.  At that time we stopped her STEMI and placed a 3.5 x 15 mm length and 3.5x 28 Xience eluding stent mid RCA.  She also has significant distal left main lesion some proximal LAD disease.  She is not really a candidate for surgical intervention.  We have been managing her medically.    She is here today for a follow-up and states that overall she is doing well, although she did have an episode of chest pain on  while laying in bed.  She said that it lasted a few minutes and she took a sublingual nitroglycerin with relief of the pain. She has been doing her daily activities of living, and walk up the flight of stairs in her home without shortness of breath or chest pain.  She has been keeping a blood pressure log at home and her blood pressures are consistently in the systolic 130-140s range.  She denies orthopnea, PND.     She is complaining of breathlessness.  Not really worse when she lies down she has a little bit of swelling in her right foot.  No chest pain no palpitations no syncope blood pressures have been okay        Past Medical History:   Diagnosis Date   • Acute MI, inferior wall (CMS/HCC) 2020   • Anemia    • Atrial fibrillation (CMS/HCC)    • Atrial flutter (CMS/HCC)    • AV block, 3rd degree (CMS/HCC)    • Carotid artery stenosis    • Chronic systolic heart failure (CMS/HCC)    • Dermatitis    • GERD (gastroesophageal reflux disease)    • History of EKG 2016   • Hyperlipidemia    • Hypertension    • Ischemic cardiomyopathy    • Low back pain    • Osteoarthritis     knee   • Osteoporosis    • Pacemaker     • Palpitations    • Persistent atrial fibrillation (CMS/HCC)     on warfarin   • Raynaud's phenomenon    • SOB (shortness of breath) on exertion    • SSS (sick sinus syndrome) (CMS/HCC)    • URI, acute    • Venous insufficiency        Past Surgical History:   Procedure Laterality Date   • BILE DUCT STENT PLACEMENT      choledochal   • BREAST BIOPSY Left     benign   • CARDIAC CATHETERIZATION N/A 3/14/2020    Procedure: Coronary angiography;  Surgeon: Melquiades Root MD;  Location: [facility] CATH INVASIVE LOCATION;  Service: Cardiovascular;  Laterality: N/A;   • CARDIAC CATHETERIZATION  3/14/2020    Procedure: Percutaneous Manual Thrombectomy;  Surgeon: Melquiades Root MD;  [facility] C[facility];  Service: Cardiovascular;;   • CARDIAC CATHETERIZATION N/A 3/14/2020    Procedure: Stent CAROLYNN coronary;  Surgeon: Melquiades Root MD;  Location: [facility] CATH INVASIVE LOCATION;  Service: Cardiovascular;  Laterality: N/A;   • CARDIAC CATHETERIZATION Right 7/9/2020    Procedure: Percutaneous Coronary Intervention;  Surgeon: Melquiades Root MD;  Location: [facility] CATH INVASIVE LOCATION;  Service: Cardiovascular;  Laterality: Right;  unprotected L main PCI.  8 Fr sheath in R CFA    • CARDIAC CATHETERIZATION N/A 7/9/2020    Procedure: Stent CAROLYNN coronary;  Surgeon: Melquiades Root MD;  Location: [facility] CATH INVASIVE LOCATION;  Service: Cardiovascular;  Laterality: N/A;   • CARDIAC CATHETERIZATION N/A 7/9/2020    Procedure: Atherectomy-coronary;  Surgeon: Melquiades Root MD;  Location: [facility] CATH INVASIVE LOCATION;  Service: Cardiovascular;  Laterality: N/A;   • CARDIAC ELECTROPHYSIOLOGY PROCEDURE N/A 9/14/2016    Procedure: Lead Revision PPM B[place]ON;  Surgeon: Kale Méndez MD;  Location: [facility] CATH INVASIVE LOCATION;  Service:    • CATARACT EXTRACTION W/ INTRAOCULAR LENS  IMPLANT, BILATERAL Bilateral    • CHOLECYSTECTOMY     • COLONOSCOPY  09/2012   • FACIAL COSMETIC SURGERY     • OTHER SURGICAL HISTORY       interrogation of implantable loop recorder remote, up to 30 days   • PACEMAKER IMPLANTATION     • SINUS SURGERY         Social History     Socioeconomic History   • Marital status:      Spouse name: Not on file   • Number of children: Not on file   • Years of education: Not on file   • Highest education level: Not on file   Occupational History   • Occupation: retired   Tobacco Use   • Smoking status: Former Smoker     Packs/day: 1.50     Start date:      Quit date:      Years since quittin.7   • Smokeless tobacco: Never Used   • Tobacco comment: CAFFEINE USE: 3-4 CUPS DECAF COFFEE DAILY   Substance and Sexual Activity   • Alcohol use: No   • Drug use: No   • Sexual activity: Defer     Comment:        Family History   Problem Relation Age of Onset   • Heart disease Mother    • Stroke Mother    • Heart disease Father    • Diabetes Sister    • Hodgkin's lymphoma Sister    • Heart disease Sister         pacemaker   • Heart disease Other    • Stroke Other    • Endometrial cancer Sister        Review of Systems   Constitution: Negative for decreased appetite, fever, malaise/fatigue and weight loss.   HENT: Negative for nosebleeds.    Eyes: Negative for double vision.   Cardiovascular: Negative for chest pain, claudication, cyanosis, dyspnea on exertion, irregular heartbeat, leg swelling, near-syncope, orthopnea, palpitations, paroxysmal nocturnal dyspnea and syncope.   Respiratory: Negative for cough, hemoptysis and shortness of breath.    Hematologic/Lymphatic: Negative for bleeding problem.   Skin: Negative for rash.   Musculoskeletal: Negative for falls and myalgias.   Gastrointestinal: Negative for hematochezia, jaundice, melena, nausea and vomiting.   Genitourinary: Negative for hematuria.   Neurological: Negative for dizziness and seizures.   Psychiatric/Behavioral: Negative for altered mental status and memory loss.       Allergies   Allergen Reactions   • Carvedilol Other (See  Comments)     MAKES HER VERY COLD   • Codeine Nausea And Vomiting   • Lisinopril Dizziness   • Penicillins Swelling   • Ranexa [Ranolazine Er] Headache   • Sulfa Antibiotics Nausea And Vomiting   • Tramadol Nausea And Vomiting         Current Outpatient Medications:   •  acetaminophen (TYLENOL) 325 MG tablet, Take 325 mg by mouth 2 (two) times a day as needed for mild pain (1-3)., Disp: , Rfl:   •  atorvastatin (LIPITOR) 40 MG tablet, Take 1 tablet by mouth Daily., Disp: 30 tablet, Rfl: 4  •  carbidopa-levodopa (SINEMET)  MG per tablet, Take 1 tablet by mouth Every Night., Disp: , Rfl: 11  •  Cholecalciferol (VITAMIN D3) 5000 UNITS capsule capsule, Take 5,000 Units by mouth As Needed., Disp: , Rfl:   •  clopidogrel (PLAVIX) 75 MG tablet, Take 1 tablet by mouth Daily., Disp: 30 tablet, Rfl: 11  •  ferrous gluconate (FERGON) 324 MG tablet, Take 324 mg by mouth Daily With Breakfast., Disp: , Rfl:   •  fluticasone (FLONASE) 50 MCG/ACT nasal spray, 2 sprays into each nostril daily., Disp: , Rfl:   •  furosemide (LASIX) 40 MG tablet, TAKE 1 TABLET BY MOUTH EVERY DAY, Disp: 90 tablet, Rfl: 1  •  loratadine (CLARITIN) 10 MG tablet, Take 1 tablet by mouth daily., Disp: , Rfl:   •  nitroglycerin (NITROSTAT) 0.4 MG SL tablet, 1 under the tongue as needed for angina, may repeat q5mins for up three doses, Disp: 100 tablet, Rfl: 11  •  pantoprazole (Protonix) 40 MG EC tablet, Take 1 tablet by mouth Daily., Disp: 30 tablet, Rfl: 11  •  pyridoxine (B-6) 200 MG tablet, Take 1 tablet by mouth daily., Disp: , Rfl:   •  saccharomyces boulardii (FLORASTOR) 250 MG capsule, Take 1 capsule by mouth 2 (Two) Times a Day., Disp: , Rfl:   •  spironolactone (ALDACTONE) 25 MG tablet, TAKE 1 TABLET BY MOUTH EVERY DAY, Disp: 90 tablet, Rfl: 1  •  temazepam (RESTORIL) 7.5 MG capsule, 15 mg., Disp: , Rfl: 1  •  Umeclidinium-Vilanterol 62.5-25 MCG/INH aerosol powder , Inhale 1 puff Daily., Disp: , Rfl:   •  VENTOLIN  (90 BASE) MCG/ACT  "inhaler, INHALE 2 PUFFS BY MOUTH EVERY 4 HOURS AS NEEDED, Disp: , Rfl: 4  •  warfarin (COUMADIN) 2.5 MG tablet, Atrial fibrillation  Indications: Atrial Fibrillation, Disp: 30 tablet, Rfl: 3  •  warfarin (COUMADIN) 5 MG tablet, Atrial fibrillation  Indications: Atrial Fibrillation, Disp: 30 tablet, Rfl: 3    Current Facility-Administered Medications:   •  nitroglycerin (NITROSTAT) SL tablet 0.4 mg, 0.4 mg, Sublingual, Q5 Min PRN, Quita Mahoney, APRN  •  sodium chloride 0.9 % flush 10 mL, 10 mL, Intravenous, PRN, Quita Mahoney, APRN      Objective:     Vitals:    09/17/20 1444   BP: 148/86   BP Location: Right arm   Patient Position: Lying   Cuff Size: Adult   Pulse: 80   Resp: 16   SpO2: 98%   Weight: 72.6 kg (160 lb)   Height: 157.5 cm (62\")     Body mass index is 29.26 kg/m².    Constitutional:       Appearance: Well-developed.   Eyes:      General: No scleral icterus.  HENT:      Head: Normocephalic.   Neck:      Thyroid: No thyromegaly.      Vascular: No JVD.      Lymphadenopathy: No cervical adenopathy.   Pulmonary:      Effort: Pulmonary effort is normal.      Breath sounds: Normal breath sounds. No wheezing. No rales.   Chest:       Cardiovascular:      Normal rate. Regular rhythm.      No gallop.   Edema:     Peripheral edema absent.   Abdominal:      Palpations: Abdomen is soft.      Tenderness: There is no abdominal tenderness.   Musculoskeletal: Normal range of motion.   Skin:     General: Skin is warm and dry.      Findings: No rash.   Neurological:      Mental Status: Alert and oriented to person, place, and time.           ECG 12 Lead    Date/Time: 9/17/2020 3:46 PM  Performed by: Melquiades Root MD  Authorized by: Melquiades Root MD   Comparison: compared with previous ECG   Similar to previous ECG  Rhythm: sinus rhythm and paced    Clinical impression: abnormal EKG             Assessment:       Diagnosis Plan   1. Cardiac pacemaker in situ  XR Chest PA & Lateral    Comprehensive Metabolic Panel "    CBC & Differential    proBNP    Protime-INR   2. Essential hypertension  XR Chest PA & Lateral    Comprehensive Metabolic Panel    CBC & Differential    proBNP    Protime-INR   3. Cardiomyopathy, dilated (CMS/HCC)  XR Chest PA & Lateral    Comprehensive Metabolic Panel    CBC & Differential    proBNP    Protime-INR   4. Coronary artery disease involving native coronary artery of native heart with angina pectoris (CMS/HCC)  XR Chest PA & Lateral    Comprehensive Metabolic Panel    CBC & Differential    proBNP    Protime-INR   5. Dyspnea on exertion  XR Chest PA & Lateral    Comprehensive Metabolic Panel    CBC & Differential    proBNP    Protime-INR          Plan:       In it is a little difficult to figure out the etiology of her breathlessness it is possible it could be heart failure but it is also could be COPD could be coronary disease she has a small vessel disease we have gotten a great result with our interventions on her so I actually do not think it is probably angina.  The other problem is in the past she has been so intolerant of a lot of her medicines.  I am to send her for chest x-ray and some blood work we may increase her diuretic and see if that helps.  Have her follow-up with Tammie in 3 months and see me in a year    As always, it has been a pleasure to participate in your patient's care.      Sincerely,       Melquiades Root MD

## 2020-09-18 ENCOUNTER — APPOINTMENT (OUTPATIENT)
Dept: CARDIAC REHAB | Facility: HOSPITAL | Age: 85
End: 2020-09-18

## 2020-09-21 ENCOUNTER — APPOINTMENT (OUTPATIENT)
Dept: CARDIAC REHAB | Facility: HOSPITAL | Age: 85
End: 2020-09-21

## 2020-12-18 ENCOUNTER — OFFICE VISIT (OUTPATIENT)
Dept: CARDIOLOGY | Facility: CLINIC | Age: 85
End: 2020-12-18

## 2020-12-18 VITALS
HEART RATE: 81 BPM | SYSTOLIC BLOOD PRESSURE: 132 MMHG | RESPIRATION RATE: 18 BRPM | WEIGHT: 160.6 LBS | HEIGHT: 62 IN | BODY MASS INDEX: 29.55 KG/M2 | OXYGEN SATURATION: 99 % | DIASTOLIC BLOOD PRESSURE: 78 MMHG

## 2020-12-18 DIAGNOSIS — I25.119 CORONARY ARTERY DISEASE INVOLVING NATIVE CORONARY ARTERY OF NATIVE HEART WITH ANGINA PECTORIS (HCC): Primary | ICD-10-CM

## 2020-12-18 DIAGNOSIS — Z95.0 CARDIAC PACEMAKER IN SITU: ICD-10-CM

## 2020-12-18 DIAGNOSIS — I25.5 ISCHEMIC CARDIOMYOPATHY: ICD-10-CM

## 2020-12-18 DIAGNOSIS — I48.21 PERMANENT ATRIAL FIBRILLATION (HCC): ICD-10-CM

## 2020-12-18 PROCEDURE — 93000 ELECTROCARDIOGRAM COMPLETE: CPT | Performed by: NURSE PRACTITIONER

## 2020-12-18 PROCEDURE — 99214 OFFICE O/P EST MOD 30 MIN: CPT | Performed by: NURSE PRACTITIONER

## 2020-12-18 NOTE — PROGRESS NOTES
Date of Office Visit: 2020  Encounter Provider: RONEL Goodson  Place of Service: [facility] CARDIOLOGY  Patient Name: Vicky Stewart  :10/18/1933    Chief Complaint   Patient presents with   • Atrial Fibrillation     3 MTH FOLLOW UP   • Coronary Artery Disease   • Hypertension   :     HPI: Vicky Stewart is a 87 y.o. female, new to me, who presents today for follow-up.  Old records have been obtained and reviewed by me.  She is a patient of Dr. Root's with a past cardiac history significant for coronary artery disease, ischemic cardiomyopathy, mitral regurgitation, aortic stenosis, hypertension, third-degree heart block status post permanent pacemaker, and persistent atrial fibrillation.  In March of this year, she had a inferior-anterolateral STEMI and underwent stent placement to the mid RCA.  She was evaluated by cardiac surgery but was not a surgical candidate.  Her last echocardiogram was in  of this year, revealing an EF of 43%, mild to moderate aortic stenosis, moderate to severe mitral regurgitation, and moderate to severe tricuspid regurgitation.  She continued to have anginal symptoms despite medical therapy.  In July of this year, she was admitted for high risk PCI of the left main.  In August, she presented following an episode of chest pain but work-up was unrevealing.   She was last seen in the office by Dr. Root on 2020 at which time she was reporting breathlessness.  She denied any chest pain.  She was sent for a chest x-ray and labs which were normal.  She was not in heart failure.  Dr. Root did not feel her symptoms were anginal.  No changes were made to her medical regimen, and she was advised to follow-up with me in 3 months.   Overall, she has been feeling well.  She has occasional breathlessness but overall this has improved.  She denies any PND or orthopnea.  She denies any chest pain.  Although she does report she will  Problem: PT General Care Plan  Goal: PT target date for goal attainment  PT: patient displayed progression with gait training and transfers.     Discharge Planner PT   Patient plan for discharge: TCU  Current status: patient ambulated 150 feet and 200 feet using rolling walker with CGA. CGA with transfer  Barriers to return to prior living situation: decreased activity tolerance and requires assist secondary to balance  Recommendations for discharge: TCU  Rationale for recommendations: improve gait, balance and endurance for safe return home.        Entered by: Jia Mustafa 02/02/2018 4:51 PM           "occasionally \"feel it\" and be aware of her heart.  She denies any dizziness, syncope, bleeding difficulties or melena.  She has been in physical therapy for her back.  She is worried because she is not scheduled for another pacemaker check that she is aware of.    Past Medical History:   Diagnosis Date   • Acute MI, inferior wall (CMS/HCC) 03/2020   • Anemia    • Atrial fibrillation (CMS/[facility])    • Atrial flutter (CMS/[facility])    • AV block, 3rd degree (CMS/[facility])    • Carotid artery stenosis    • Chronic systolic heart failure (CMS/[facility])    • Dermatitis    • GERD (gastroesophageal reflux disease)    • History of EKG 06/17/2016   • Hyperlipidemia    • Hypertension    • Ischemic cardiomyopathy    • Low back pain    • Osteoarthritis     knee   • Osteoporosis    • Pacemaker    • Palpitations    • Persistent atrial fibrillation (CMS/[facility])     on warfarin   • Raynaud's phenomenon    • SOB (shortness of breath) on exertion    • SSS (sick sinus syndrome) (CMS/[facility])    • URI, acute    • Venous insufficiency        Past Surgical History:   Procedure Laterality Date   • BILE DUCT STENT PLACEMENT      choledochal   • BREAST BIOPSY Left     benign   • CARDIAC CATHETERIZATION N/A 3/14/2020    Procedure: Coronary angiography;  Surgeon: Melquiades Root MD;  Location: [facility] CATH INVASIVE LOCATION;  Service: Cardiovascular;  Laterality: N/A;   • CARDIAC CATHETERIZATION  3/14/2020    Procedure: Percutaneous Manual Thrombectomy;  Surgeon: Melquiades Root MD;  Location: [facility] CATH INVASIVE LOCATION;  Service: Cardiovascular;;   • CARDIAC CATHETERIZATION N/A 3/14/2020    Procedure: Stent CAROLYNN coronary;  Surgeon: Melquiades Root MD;  Location: [facility] CATH INVASIVE LOCATION;  Service: Cardiovascular;  Laterality: N/A;   • CARDIAC CATHETERIZATION Right 7/9/2020    Procedure: Percutaneous Coronary Intervention;  Surgeon: Melquiades Root MD;  Location: [facility] CATH INVASIVE LOCATION;  Service: Cardiovascular;  Laterality: Right;  unprotected L " main PCI.  8 Fr sheath in R CFA    • CARDIAC CATHETERIZATION N/A 2020    Procedure: Stent CAROLYNN coronary;  Surgeon: Melquiades Root MD;  Location:  [facility] CATH INVASIVE LOCATION;  Service: Cardiovascular;  Laterality: N/A;   • CARDIAC CATHETERIZATION N/A 2020    Procedure: Atherectomy-coronary;  Surgeon: Melquiades Root MD;  Location:  [facility] CATH INVASIVE LOCATION;  Service: Cardiovascular;  Laterality: N/A;   • CARDIAC ELECTROPHYSIOLOGY PROCEDURE N/A 2016    Procedure: Lead Revision PPM BOSTON;  Surgeon: Kale Méndez MD;  Location:  [facility] CATH INVASIVE LOCATION;  Service:    • CATARACT EXTRACTION W/ INTRAOCULAR LENS  IMPLANT, BILATERAL Bilateral    • CHOLECYSTECTOMY     • COLONOSCOPY  2012   • FACIAL COSMETIC SURGERY     • OTHER SURGICAL HISTORY      interrogation of implantable loop recorder remote, up to 30 days   • PACEMAKER IMPLANTATION     • SINUS SURGERY         Social History     Socioeconomic History   • Marital status:      Spouse name: Not on file   • Number of children: Not on file   • Years of education: Not on file   • Highest education level: Not on file   Occupational History   • Occupation: retired   Tobacco Use   • Smoking status: Former Smoker     Packs/day: 1.50     Start date:      Quit date:      Years since quittin.9   • Smokeless tobacco: Never Used   • Tobacco comment: CAFFEINE USE: 3 CUPS DECAF COFFEE DAILY   Substance and Sexual Activity   • Alcohol use: No   • Drug use: No   • Sexual activity: Defer     Comment:        Family History   Problem Relation Age of Onset   • Heart disease Mother    • Stroke Mother    • Heart disease Father    • Diabetes Sister    • Hodgkin's lymphoma Sister    • Heart disease Sister         pacemaker   • Heart disease Other    • Stroke Other    • Endometrial cancer Sister        Review of Systems   Constitution: Negative for chills, fever and malaise/fatigue.   Cardiovascular: Positive for dyspnea on exertion and  palpitations. Negative for chest pain, leg swelling, near-syncope, orthopnea, paroxysmal nocturnal dyspnea and syncope.   Respiratory: Negative for cough and shortness of breath.    Musculoskeletal: Negative for joint pain, joint swelling and myalgias.   Gastrointestinal: Negative for abdominal pain, diarrhea, melena, nausea and vomiting.   Genitourinary: Negative for frequency and hematuria.   Neurological: Positive for light-headedness. Negative for numbness, paresthesias and seizures.   Allergic/Immunologic: Negative.    All other systems reviewed and are negative.      Allergies   Allergen Reactions   • Carvedilol Other (See Comments)     MAKES HER VERY COLD   • Codeine Nausea And Vomiting   • Lisinopril Dizziness   • Penicillins Swelling   • Ranexa [Ranolazine Er] Headache   • Sulfa Antibiotics Nausea And Vomiting   • Tramadol Nausea And Vomiting         Current Outpatient Medications:   •  acetaminophen (TYLENOL) 325 MG tablet, Take 325 mg by mouth 2 (two) times a day as needed for mild pain (1-3)., Disp: , Rfl:   •  atorvastatin (LIPITOR) 40 MG tablet, Take 1 tablet by mouth Daily., Disp: 30 tablet, Rfl: 4  •  carbidopa-levodopa (SINEMET)  MG per tablet, Take 1 tablet by mouth Every Night., Disp: , Rfl: 11  •  Cholecalciferol (VITAMIN D3) 5000 UNITS capsule capsule, Take 5,000 Units by mouth As Needed., Disp: , Rfl:   •  clopidogrel (PLAVIX) 75 MG tablet, Take 1 tablet by mouth Daily., Disp: 30 tablet, Rfl: 11  •  ferrous gluconate (FERGON) 324 MG tablet, Take 324 mg by mouth Daily With Breakfast., Disp: , Rfl:   •  fluticasone (FLONASE) 50 MCG/ACT nasal spray, 2 sprays into each nostril daily., Disp: , Rfl:   •  furosemide (LASIX) 40 MG tablet, TAKE 1 TABLET BY MOUTH EVERY DAY, Disp: 90 tablet, Rfl: 1  •  loratadine (CLARITIN) 10 MG tablet, Take 1 tablet by mouth daily., Disp: , Rfl:   •  nitroglycerin (NITROSTAT) 0.4 MG SL tablet, 1 under the tongue as needed for angina, may repeat q5mins for up three  "doses, Disp: 100 tablet, Rfl: 11  •  pantoprazole (Protonix) 40 MG EC tablet, Take 1 tablet by mouth Daily., Disp: 30 tablet, Rfl: 11  •  pyridoxine (B-6) 200 MG tablet, Take 1 tablet by mouth daily., Disp: , Rfl:   •  saccharomyces boulardii (FLORASTOR) 250 MG capsule, Take 1 capsule by mouth 2 (Two) Times a Day., Disp: , Rfl:   •  spironolactone (ALDACTONE) 25 MG tablet, TAKE 1 TABLET BY MOUTH EVERY DAY, Disp: 90 tablet, Rfl: 1  •  temazepam (RESTORIL) 7.5 MG capsule, 15 mg., Disp: , Rfl: 1  •  Umeclidinium-Vilanterol 62.5-25 MCG/INH aerosol powder , Inhale 1 puff Daily., Disp: , Rfl:   •  VENTOLIN  (90 BASE) MCG/ACT inhaler, INHALE 2 PUFFS BY MOUTH EVERY 4 HOURS AS NEEDED, Disp: , Rfl: 4  •  warfarin (COUMADIN) 2.5 MG tablet, Atrial fibrillation  Indications: Atrial Fibrillation, Disp: 30 tablet, Rfl: 3  •  warfarin (COUMADIN) 5 MG tablet, Atrial fibrillation  Indications: Atrial Fibrillation, Disp: 30 tablet, Rfl: 3    Current Facility-Administered Medications:   •  nitroglycerin (NITROSTAT) SL tablet 0.4 mg, 0.4 mg, Sublingual, Q5 Min PRN, Quita Mahoney R, APRN  •  sodium chloride 0.9 % flush 10 mL, 10 mL, Intravenous, PRN, Quita Mahoney R, APRN      Objective:     Vitals:    12/18/20 1120 12/18/20 1129   BP: 136/78 132/78   BP Location: Right arm Left arm   Patient Position: Sitting Sitting   Pulse: 81    Resp: 18    SpO2: 99%    Weight: 72.8 kg (160 lb 9.6 oz)    Height: 157.5 cm (62\")      Body mass index is 29.37 kg/m².    PHYSICAL EXAM:    Constitutional:       General: Not in acute distress.     Appearance: Well-developed. Not diaphoretic.   Eyes:      Pupils: Pupils are equal, round, and reactive to light.   HENT:      Head: Normocephalic and atraumatic.   Neck:      Thyroid: No thyromegaly.      Vascular: No JVD.   Pulmonary:      Effort: Pulmonary effort is normal. No respiratory distress.      Breath sounds: Normal breath sounds.   Cardiovascular:      Normal rate. Regular rhythm.      Murmurs: " There is a grade 1/6 harsh midsystolic murmur at the URSB, radiating to the neck.   Pulses:     Intact distal pulses.   Abdominal:      General: Bowel sounds are normal. There is no distension.      Palpations: Abdomen is soft. There is no hepatomegaly or splenomegaly.      Tenderness: There is no abdominal tenderness.   Musculoskeletal: Normal range of motion.   Skin:     General: Skin is warm and dry.      Findings: No erythema.   Neurological:      Mental Status: Alert and oriented to person, place, and time.   Psychiatric:         Behavior: Behavior normal.         Judgment: Judgment normal.           ECG 12 Lead    Date/Time: 12/18/2020 11:40 AM  Performed by: Tammie Morgan APRN  Authorized by: Tammie Morgan APRN   Comparison: compared with previous ECG from 9/17/2020  Similar to previous ECG  Rhythm: paced  Rate: normal  BPM: 80  Conduction: non-specific intraventricular conduction delay  Q waves: II, III, aVF, V5, V6, V4, V1, V2 and V3      Clinical impression: abnormal EKG  Comments: Indication: CAD              Assessment:       Diagnosis Plan   1. Coronary artery disease involving native coronary artery of native heart with angina pectoris (CMS/HCC)  ECG 12 Lead   2. Ischemic cardiomyopathy     3. Permanent atrial fibrillation (CMS/HCC)     4. Cardiac pacemaker in situ       Orders Placed This Encounter   Procedures   • ECG 12 Lead     This order was created via procedure documentation          Plan:       1.  Coronary artery disease.  Status post inferior STEMI in March of this year and stent placement of the RCA.  Subsequently, she underwent high risk PCI of the left main in July of this year.  She denies any symptoms of angina.  She is on good medical therapy.      2.  Ischemic cardiomyopathy.  Echocardiogram from June revealed an EF of 43%.  She is on spironolactone.  She has been intolerant to beta blockade due to fatigue.  Evidently she was intolerant to losartan due to dizziness.  She  denies any symptoms of heart failure and appears euvolemic.      3.  Permanent atrial fibrillation/AV block status post pacemaker.  She is anticoagulated on warfarin.  I will send a message to the pacemaker department to get her scheduled for another pacemaker check.      Overall I think she is stable and doing well.  She denies any symptoms of angina or heart failure.  I am not going to make any changes, and she will follow-up with Dr. Root in 6 months or sooner if needed.      As always, it has been a pleasure to participate in your patient's care.      Sincerely,         RONEL Wallis

## 2020-12-21 RX ORDER — ATORVASTATIN CALCIUM 40 MG/1
TABLET, FILM COATED ORAL
Qty: 30 TABLET | Refills: 4 | Status: SHIPPED | OUTPATIENT
Start: 2020-12-21 | End: 2021-04-21

## 2021-01-11 RX ORDER — SPIRONOLACTONE 25 MG/1
TABLET ORAL
Qty: 90 TABLET | Refills: 1 | Status: SHIPPED | OUTPATIENT
Start: 2021-01-11 | End: 2021-07-07

## 2021-03-02 RX ORDER — CLOPIDOGREL BISULFATE 75 MG/1
TABLET ORAL
Qty: 90 TABLET | Refills: 3 | Status: SHIPPED | OUTPATIENT
Start: 2021-03-02 | End: 2022-01-03

## 2021-03-17 RX ORDER — FUROSEMIDE 40 MG/1
TABLET ORAL
Qty: 90 TABLET | Refills: 3 | Status: SHIPPED | OUTPATIENT
Start: 2021-03-17 | End: 2022-01-04

## 2021-04-08 ENCOUNTER — APPOINTMENT (OUTPATIENT)
Dept: WOMENS IMAGING | Facility: HOSPITAL | Age: 86
End: 2021-04-08

## 2021-04-08 PROCEDURE — 77067 SCR MAMMO BI INCL CAD: CPT | Performed by: RADIOLOGY

## 2021-04-08 PROCEDURE — 77063 BREAST TOMOSYNTHESIS BI: CPT | Performed by: RADIOLOGY

## 2021-04-21 RX ORDER — ATORVASTATIN CALCIUM 40 MG/1
TABLET, FILM COATED ORAL
Qty: 90 TABLET | Refills: 1 | Status: SHIPPED | OUTPATIENT
Start: 2021-04-21 | End: 2021-10-13

## 2021-04-27 ENCOUNTER — TRANSCRIBE ORDERS (OUTPATIENT)
Dept: ADMINISTRATIVE | Facility: HOSPITAL | Age: 86
End: 2021-04-27

## 2021-04-27 DIAGNOSIS — L97.511 ULCER OF TOE OF RIGHT FOOT, LIMITED TO BREAKDOWN OF SKIN (HCC): Primary | ICD-10-CM

## 2021-05-13 ENCOUNTER — HOSPITAL ENCOUNTER (OUTPATIENT)
Dept: CARDIOLOGY | Facility: HOSPITAL | Age: 86
Discharge: HOME | End: 2021-05-13
Admitting: PODIATRIST

## 2021-05-13 DIAGNOSIS — L97.511 ULCER OF TOE OF RIGHT FOOT, LIMITED TO BREAKDOWN OF SKIN (HCC): ICD-10-CM

## 2021-05-13 LAB
BH CV LOWER ARTERIAL LEFT 2ND DIGIT SYS MAX: 134 MMHG
BH CV LOWER ARTERIAL LEFT 3RD DIGIT SYS MAX: 129 MMHG
BH CV LOWER ARTERIAL LEFT ABI RATIO: 1.11
BH CV LOWER ARTERIAL LEFT DORSALIS PEDIS SYS MAX: 152 MMHG
BH CV LOWER ARTERIAL LEFT GREAT TOE SYS MAX: 112 MMHG
BH CV LOWER ARTERIAL LEFT POST TIBIAL SYS MAX: 161 MMHG
BH CV LOWER ARTERIAL LEFT TBI RATIO: 0.77
BH CV LOWER ARTERIAL RIGHT 2ND DIGIT SYS MAX: 128 MMHG
BH CV LOWER ARTERIAL RIGHT ABI RATIO: 1.14
BH CV LOWER ARTERIAL RIGHT DORSALIS PEDIS SYS MAX: 156 MMHG
BH CV LOWER ARTERIAL RIGHT GREAT TOE SYS MAX: 109 MMHG
BH CV LOWER ARTERIAL RIGHT POST TIBIAL SYS MAX: 166 MMHG
BH CV LOWER ARTERIAL RIGHT TBI RATIO: 0.75
MAXIMAL PREDICTED HEART RATE: 133 BPM
STRESS TARGET HR: 113 BPM
UPPER ARTERIAL LEFT ARM BRACHIAL SYS MAX: 145 MMHG
UPPER ARTERIAL RIGHT ARM BRACHIAL SYS MAX: 144 MMHG

## 2021-05-13 PROCEDURE — 93922 UPR/L XTREMITY ART 2 LEVELS: CPT

## 2021-05-17 RX ORDER — PANTOPRAZOLE SODIUM 40 MG/1
TABLET, DELAYED RELEASE ORAL
Qty: 30 TABLET | Refills: 11 | Status: SHIPPED | OUTPATIENT
Start: 2021-05-17 | End: 2022-03-18

## 2021-07-07 RX ORDER — SPIRONOLACTONE 25 MG/1
TABLET ORAL
Qty: 90 TABLET | Refills: 0 | Status: SHIPPED | OUTPATIENT
Start: 2021-07-07 | End: 2021-09-30

## 2021-08-06 ENCOUNTER — OFFICE VISIT (OUTPATIENT)
Dept: CARDIOLOGY | Facility: CLINIC | Age: 86
End: 2021-08-06

## 2021-08-06 ENCOUNTER — CLINICAL SUPPORT NO REQUIREMENTS (OUTPATIENT)
Dept: CARDIOLOGY | Facility: CLINIC | Age: 86
End: 2021-08-06

## 2021-08-06 VITALS
HEIGHT: 62 IN | WEIGHT: 145.8 LBS | SYSTOLIC BLOOD PRESSURE: 140 MMHG | BODY MASS INDEX: 26.83 KG/M2 | DIASTOLIC BLOOD PRESSURE: 80 MMHG | HEART RATE: 86 BPM

## 2021-08-06 DIAGNOSIS — I25.119 CORONARY ARTERY DISEASE INVOLVING NATIVE CORONARY ARTERY OF NATIVE HEART WITH ANGINA PECTORIS (HCC): ICD-10-CM

## 2021-08-06 DIAGNOSIS — I48.21 PERMANENT ATRIAL FIBRILLATION (HCC): ICD-10-CM

## 2021-08-06 DIAGNOSIS — R06.09 DYSPNEA ON EXERTION: ICD-10-CM

## 2021-08-06 DIAGNOSIS — I21.19 ACUTE MI, INFERIOR WALL (HCC): ICD-10-CM

## 2021-08-06 DIAGNOSIS — Z95.0 CARDIAC PACEMAKER IN SITU: Primary | ICD-10-CM

## 2021-08-06 DIAGNOSIS — I44.2 THIRD DEGREE AV BLOCK (HCC): Primary | ICD-10-CM

## 2021-08-06 DIAGNOSIS — I49.5 SICK SINUS SYNDROME (HCC): ICD-10-CM

## 2021-08-06 DIAGNOSIS — I25.5 ISCHEMIC CARDIOMYOPATHY: ICD-10-CM

## 2021-08-06 PROCEDURE — 93000 ELECTROCARDIOGRAM COMPLETE: CPT | Performed by: INTERNAL MEDICINE

## 2021-08-06 PROCEDURE — 99214 OFFICE O/P EST MOD 30 MIN: CPT | Performed by: INTERNAL MEDICINE

## 2021-08-06 PROCEDURE — 93279 PRGRMG DEV EVAL PM/LDLS PM: CPT | Performed by: INTERNAL MEDICINE

## 2021-08-06 NOTE — PROGRESS NOTES
Date of Office Visit: 21  Encounter Provider: Melquiades Root MD  Place of Service: [facility] CARDIOLOGY  Patient Name: Vicky Stewart  :10/18/1933  [id number]    Chief Complaint   Patient presents with   • Coronary Artery Disease   :     HPI: Vicky Stewart is a 87 y.o. female  who had inferolateral myocardial infarction in 2020.  At that time we stopped her STEMI and placed a 3.5 x 15 mm length and 3.5x 28 Xience eluding stent mid RCA.  She also has significant distal left main lesion some proximal LAD disease.  She is not really a candidate for surgical intervention.  We have been managing her medically.    In 2020 we ended up bringing her back to the Cath Lab for what we thought was recurrent angina and we did a rotational atherectomy and crushing drug-eluting stenting in the left main into the LAD and circumflex.  She is also had a history of a permanent pacemaker and atrial fibrillation.  Echocardiography in 2020 showed an ejection fraction of 40 to 45% with aortic sclerosis moderate to severe tricuspid insufficiency.  Normal pulmonary pressures.  She has some other small vessel coronary disease that we have managed medically.  She has COPD and its been difficult in the past trying to figure out if some of her shortness of breath is from that or from her heart.    She is here for follow-up and since I seen her she has had a pretty big year.  Family is very important to her and she has had a lot of visitors to see her and she has been very grateful about that.  She has some chronic shortness of breath she saw her pulmonary physicians they told her they felt it probably was not her lungs although she does have COPD.  She does not have PND does not have orthopnea does not have edema.  She is very dignified lady and is having a lot of nuisance bleeding in her arms and is kind of bothered by that.  She has not had any other bleeding she is also had 2  episodes of jaw discomfort and up into her throat both of which occurred at nighttime.  She does have a history of reflux.  She is not had any melena hematochezia.  No palpitations.  No real discomfort in her chest        Past Medical History:   Diagnosis Date   • Acute MI, inferior wall (CMS/HCC) 03/2020   • Anemia    • Atrial fibrillation (CMS/[facility])    • Atrial flutter (CMS/[facility])    • AV block, 3rd degree (CMS/[facility])    • Carotid artery stenosis    • Chronic systolic heart failure (CMS/[facility])    • Dermatitis    • GERD (gastroesophageal reflux disease)    • History of EKG 06/17/2016   • Hyperlipidemia    • Hypertension    • Ischemic cardiomyopathy    • Low back pain    • Osteoarthritis     knee   • Osteoporosis    • Pacemaker    • Palpitations    • Persistent atrial fibrillation (CMS/[facility])     on warfarin   • Raynaud's phenomenon    • SOB (shortness of breath) on exertion    • SSS (sick sinus syndrome) (CMS/[facility])    • URI, acute    • Venous insufficiency        Past Surgical History:   Procedure Laterality Date   • BILE DUCT STENT PLACEMENT      choledochal   • BREAST BIOPSY Left     benign   • CARDIAC CATHETERIZATION N/A 3/14/2020    Procedure: Coronary angiography;  Surgeon: Melquiades Root MD;  Location: [facility] CATH INVASIVE LOCATION;  Service: Cardiovascular;  Laterality: N/A;   • CARDIAC CATHETERIZATION  3/14/2020    Procedure: Percutaneous Manual Thrombectomy;  Surgeon: Melquiades Root MD;  Location: [facility] CATH INVASIVE LOCATION;  Service: Cardiovascular;;   • CARDIAC CATHETERIZATION N/A 3/14/2020    Procedure: Stent CAROLYNN coronary;  Surgeon: Melquiades Root MD;  Location: [facility] CATH INVASIVE LOCATION;  Service: Cardiovascular;  Laterality: N/A;   • CARDIAC CATHETERIZATION Right 7/9/2020    Procedure: Percutaneous Coronary Intervention;  Surgeon: Melquiades Root MD;  Location: [facility] CATH INVASIVE LOCATION;  Service: Cardiovascular;  Laterality: Right;  unprotected L main PCI.  8 Fr sheath in R CFA    • CARDIAC  CATHETERIZATION N/A 7/9/2020    Procedure: Stent CAROLYNN coronary;  Surgeon: Melquiades Root MD;  Location:  [facility] CATH INVASIVE LOCATION;  Service: Cardiovascular;  Laterality: N/A;   • CARDIAC CATHETERIZATION N/A 7/9/2020    Procedure: Atherectomy-coronary;  Surgeon: Melquiades Root MD;  Location:  [facility] CATH INVASIVE LOCATION;  Service: Cardiovascular;  Laterality: N/A;   • CARDIAC ELECTROPHYSIOLOGY PROCEDURE N/A 9/14/2016    Procedure: Lead Revision PPM BOSTON;  Surgeon: Kale Méndez MD;  Location:  [facility] CATH INVASIVE LOCATION;  Service:    • CATARACT EXTRACTION W/ INTRAOCULAR LENS  IMPLANT, BILATERAL Bilateral    • CHOLECYSTECTOMY     • COLONOSCOPY  09/2012   • FACIAL COSMETIC SURGERY     • OTHER SURGICAL HISTORY      interrogation of implantable loop recorder remote, up to 30 days   • PACEMAKER IMPLANTATION     • SINUS SURGERY         Social History     Socioeconomic History   • Marital status:      Spouse name: Not on file   • Number of children: Not on file   • Years of education: Not on file   • Highest education level: Not on file   Tobacco Use   • Smoking status: Former Smoker     Packs/day: 1.50     Start date: 1951     Quit date: 2011     Years since quitting: 10.6   • Smokeless tobacco: Never Used   • Tobacco comment: CAFFEINE USE: 3 CUPS DECAF COFFEE DAILY   Substance and Sexual Activity   • Alcohol use: No   • Drug use: No   • Sexual activity: Defer     Comment:        Family History   Problem Relation Age of Onset   • Heart disease Mother    • Stroke Mother    • Heart disease Father    • Diabetes Sister    • Hodgkin's lymphoma Sister    • Heart disease Sister         pacemaker   • Heart disease Other    • Stroke Other    • Endometrial cancer Sister        Review of Systems   Constitutional: Negative for decreased appetite, fever, malaise/fatigue and weight loss.   HENT: Negative for nosebleeds.    Eyes: Negative for double vision.   Cardiovascular: Negative for chest pain,  claudication, cyanosis, dyspnea on exertion, irregular heartbeat, leg swelling, near-syncope, orthopnea, palpitations, paroxysmal nocturnal dyspnea and syncope.   Respiratory: Negative for cough, hemoptysis and shortness of breath.    Hematologic/Lymphatic: Negative for bleeding problem.   Skin: Negative for rash.   Musculoskeletal: Negative for falls and myalgias.   Gastrointestinal: Negative for hematochezia, jaundice, melena, nausea and vomiting.   Genitourinary: Negative for hematuria.   Neurological: Negative for dizziness and seizures.   Psychiatric/Behavioral: Negative for altered mental status and memory loss.       Allergies   Allergen Reactions   • Carvedilol Other (See Comments)     MAKES HER VERY COLD   • Codeine Nausea And Vomiting   • Lisinopril Dizziness   • Penicillins Swelling   • Ranexa [Ranolazine Er] Headache   • Sulfa Antibiotics Nausea And Vomiting   • Tramadol Nausea And Vomiting         Current Outpatient Medications:   •  acetaminophen (TYLENOL) 325 MG tablet, Take 325 mg by mouth 2 (two) times a day as needed for mild pain (1-3)., Disp: , Rfl:   •  atorvastatin (LIPITOR) 40 MG tablet, TAKE 1 TABLET BY MOUTH EVERY DAY, Disp: 90 tablet, Rfl: 1  •  carbidopa-levodopa (SINEMET)  MG per tablet, Take 1 tablet by mouth Every Night., Disp: , Rfl: 11  •  clopidogrel (PLAVIX) 75 MG tablet, TAKE 1 TABLET BY MOUTH EVERY DAY, Disp: 90 tablet, Rfl: 3  •  ferrous gluconate (FERGON) 324 MG tablet, Take 324 mg by mouth Daily With Breakfast., Disp: , Rfl:   •  fluticasone (FLONASE) 50 MCG/ACT nasal spray, 2 sprays into each nostril daily., Disp: , Rfl:   •  furosemide (LASIX) 40 MG tablet, TAKE 1 TABLET BY MOUTH EVERY DAY, Disp: 90 tablet, Rfl: 3  •  loratadine (CLARITIN) 10 MG tablet, Take 1 tablet by mouth daily., Disp: , Rfl:   •  nitroglycerin (NITROSTAT) 0.4 MG SL tablet, 1 under the tongue as needed for angina, may repeat q5mins for up three doses, Disp: 100 tablet, Rfl: 11  •  pantoprazole  "(PROTONIX) 40 MG EC tablet, TAKE 1 TABLET BY MOUTH EVERY DAY, Disp: 30 tablet, Rfl: 11  •  Probiotic Product (PROBIOTIC DAILY PO), Take  by mouth., Disp: , Rfl:   •  pyridoxine (B-6) 200 MG tablet, Take 1 tablet by mouth daily., Disp: , Rfl:   •  saccharomyces boulardii (FLORASTOR) 250 MG capsule, Take 1 capsule by mouth 2 (Two) Times a Day., Disp: , Rfl:   •  spironolactone (ALDACTONE) 25 MG tablet, TAKE 1 TABLET BY MOUTH EVERY DAY, Disp: 90 tablet, Rfl: 0  •  temazepam (RESTORIL) 7.5 MG capsule, 15 mg., Disp: , Rfl: 1  •  Umeclidinium-Vilanterol 62.5-25 MCG/INH aerosol powder , Inhale 1 puff Daily., Disp: , Rfl:   •  VENTOLIN  (90 BASE) MCG/ACT inhaler, INHALE 2 PUFFS BY MOUTH EVERY 4 HOURS AS NEEDED, Disp: , Rfl: 4  •  warfarin (COUMADIN) 2.5 MG tablet, Atrial fibrillation  Indications: Atrial Fibrillation (Patient taking differently: Every Night. Atrial fibrillation  Indications: Atrial Fibrillation), Disp: 30 tablet, Rfl: 3  •  Cholecalciferol (VITAMIN D3) 5000 UNITS capsule capsule, Take 5,000 Units by mouth As Needed., Disp: , Rfl:   •  warfarin (COUMADIN) 5 MG tablet, Atrial fibrillation  Indications: Atrial Fibrillation, Disp: 30 tablet, Rfl: 3    Current Facility-Administered Medications:   •  nitroglycerin (NITROSTAT) SL tablet 0.4 mg, 0.4 mg, Sublingual, Q5 Min PRN, Quita Mahoney R, APRN  •  sodium chloride 0.9 % flush 10 mL, 10 mL, Intravenous, PRN, Quita Mahoney R, APRN      Objective:     Vitals:    08/06/21 0835   BP: 140/80   Pulse: 86   Weight: 66.1 kg (145 lb 12.8 oz)   Height: 156.2 cm (61.5\")     Body mass index is 27.1 kg/m².    Constitutional:       Appearance: Well-developed.   Eyes:      General: No scleral icterus.  HENT:      Head: Normocephalic.   Neck:      Thyroid: No thyromegaly.      Vascular: No JVD.      Lymphadenopathy: No cervical adenopathy.   Pulmonary:      Effort: Pulmonary effort is normal.      Breath sounds: Normal breath sounds. No wheezing. No rales.   Chest: "       Cardiovascular:      Normal rate. Regular rhythm.      No gallop.   Edema:     Peripheral edema absent.   Abdominal:      Palpations: Abdomen is soft.      Tenderness: There is no abdominal tenderness.   Musculoskeletal: Normal range of motion. Skin:     General: Skin is warm and dry.      Findings: No rash.   Neurological:      Mental Status: Alert and oriented to person, place, and time.           ECG 12 Lead    Date/Time: 8/6/2021 8:50 AM  Performed by: Melquiades Root MD  Authorized by: Melquiades Root MD   Comparison: compared with previous ECG   Similar to previous ECG  Rhythm: atrial fibrillation and paced  Ectopy: unifocal PVCs             Assessment:       Diagnosis Plan   1. Cardiac pacemaker in situ     2. Dyspnea on exertion     3. Sick sinus syndrome (CMS/HCC)     4. Acute MI, inferior wall (CMS/HCC)     5. Coronary artery disease involving native coronary artery of native heart with angina pectoris (CMS/HCC)     6. Ischemic cardiomyopathy     7. Permanent atrial fibrillation (CMS/HCC)            Plan:       In general I think she is probably pretty good.  I think that she has had a great year seeing her family.  She was in a very tough situation when we first met her and she is done pretty well with that now she has A. fib and she has been on warfarin for that but she also has complex stenting her left main LAD and circumflex and really should be on long-term clopidogrel because of that were having this nuisance bleeding in her arms and I think we will have to try and tolerated as much as we can the little upsetting to her.  I do not know what the shortness of breath is from it really has not changed much certainly she has some small vessel coronary disease we had tried her on long-acting nitrates before and she developed headaches with that so that is probably not an option for us.  I think this stuff she had at nighttime could be a little bit of reflux she is not having no symptoms during the  day so at this point I really feel like we are in a good situation I am not can to change things I asked her to come back and see me in a year sooner if she has trouble    As always, it has been a pleasure to participate in your patient's care.      Sincerely,       Melquiades Root MD

## 2021-08-09 PROCEDURE — 93296 REM INTERROG EVL PM/IDS: CPT | Performed by: INTERNAL MEDICINE

## 2021-08-09 PROCEDURE — 93294 REM INTERROG EVL PM/LDLS PM: CPT | Performed by: INTERNAL MEDICINE

## 2021-09-30 RX ORDER — SPIRONOLACTONE 25 MG/1
TABLET ORAL
Qty: 90 TABLET | Refills: 2 | Status: SHIPPED | OUTPATIENT
Start: 2021-09-30 | End: 2022-06-07

## 2021-10-13 RX ORDER — ATORVASTATIN CALCIUM 40 MG/1
TABLET, FILM COATED ORAL
Qty: 90 TABLET | Refills: 0 | Status: SHIPPED | OUTPATIENT
Start: 2021-10-13

## 2021-10-13 NOTE — TELEPHONE ENCOUNTER
Pt and I missed each others call again so I left her a message regarding fasting labs and hours at the hospital. Can you place an order for lipid panel please she is over due. I sent on refill to hold her over.    DA

## 2021-10-20 RX ORDER — NITROGLYCERIN 0.4 MG/1
TABLET SUBLINGUAL
Qty: 100 TABLET | Refills: 11 | Status: SHIPPED | OUTPATIENT
Start: 2021-10-20

## 2021-10-27 ENCOUNTER — TELEPHONE (OUTPATIENT)
Dept: CARDIOLOGY | Facility: CLINIC | Age: 86
End: 2021-10-27

## 2021-10-27 NOTE — TELEPHONE ENCOUNTER
Lipid panel  10/15/21    Total Cholesterol 152  Triglyceride 75  HDL cholesterol 71  VLDL cholesterol 14  LDL cholesterol 67  T.chol/HDL ratio 2.1

## 2021-11-08 PROCEDURE — 93296 REM INTERROG EVL PM/IDS: CPT | Performed by: INTERNAL MEDICINE

## 2021-11-08 PROCEDURE — 93294 REM INTERROG EVL PM/LDLS PM: CPT | Performed by: INTERNAL MEDICINE

## 2022-01-03 RX ORDER — CLOPIDOGREL BISULFATE 75 MG/1
TABLET ORAL
Qty: 90 TABLET | Refills: 3 | Status: SHIPPED | OUTPATIENT
Start: 2022-01-03 | End: 2022-12-07

## 2022-01-04 RX ORDER — FUROSEMIDE 40 MG/1
TABLET ORAL
Qty: 90 TABLET | Refills: 3 | Status: SHIPPED | OUTPATIENT
Start: 2022-01-04 | End: 2023-03-09

## 2022-02-07 PROCEDURE — 93294 REM INTERROG EVL PM/LDLS PM: CPT | Performed by: INTERNAL MEDICINE

## 2022-02-07 PROCEDURE — 93296 REM INTERROG EVL PM/IDS: CPT | Performed by: INTERNAL MEDICINE

## 2022-02-16 ENCOUNTER — TRANSCRIBE ORDERS (OUTPATIENT)
Dept: ADMINISTRATIVE | Facility: HOSPITAL | Age: 87
End: 2022-02-16

## 2022-02-16 DIAGNOSIS — R06.02 SHORTNESS OF BREATH: Primary | ICD-10-CM

## 2022-03-01 ENCOUNTER — HOSPITAL ENCOUNTER (OUTPATIENT)
Dept: CARDIOLOGY | Facility: HOSPITAL | Age: 87
Discharge: HOME | End: 2022-03-01
Admitting: INTERNAL MEDICINE

## 2022-03-01 VITALS
HEART RATE: 80 BPM | DIASTOLIC BLOOD PRESSURE: 79 MMHG | SYSTOLIC BLOOD PRESSURE: 149 MMHG | BODY MASS INDEX: 27.38 KG/M2 | HEIGHT: 61 IN | WEIGHT: 145 LBS

## 2022-03-01 DIAGNOSIS — R06.02 SHORTNESS OF BREATH: ICD-10-CM

## 2022-03-01 PROCEDURE — 25010000002 PERFLUTREN (DEFINITY) 8.476 MG IN SODIUM CHLORIDE (PF) 0.9 % 10 ML INJECTION: Performed by: INTERNAL MEDICINE

## 2022-03-01 PROCEDURE — 93306 TTE W/DOPPLER COMPLETE: CPT | Performed by: INTERNAL MEDICINE

## 2022-03-01 PROCEDURE — 93306 TTE W/DOPPLER COMPLETE: CPT

## 2022-03-01 RX ADMIN — PERFLUTREN 2 ML: 6.52 INJECTION, SUSPENSION INTRAVENOUS at 15:55

## 2022-03-02 LAB
AORTIC ARCH: 2.6 CM
BH CV ECHO MEAS - ACS: 1.57 CM
BH CV ECHO MEAS - AO MAX PG: 12.2 MMHG
BH CV ECHO MEAS - AO MEAN PG: 6.9 MMHG
BH CV ECHO MEAS - AO ROOT DIAM: 3 CM
BH CV ECHO MEAS - AO V2 MAX: 175 CM/SEC
BH CV ECHO MEAS - AO V2 VTI: 29.2 CM
BH CV ECHO MEAS - AVA PLANIMETRY TRACED: 0.7 CM2
BH CV ECHO MEAS - AVA(I,D): 1.81 CM2
BH CV ECHO MEAS - EDV(CUBED): 77.3 ML
BH CV ECHO MEAS - EDV(MOD-SP2): 90 ML
BH CV ECHO MEAS - EDV(MOD-SP4): 115 ML
BH CV ECHO MEAS - EF(MOD-BP): 62.1 %
BH CV ECHO MEAS - EF(MOD-SP2): 61.1 %
BH CV ECHO MEAS - EF(MOD-SP4): 60 %
BH CV ECHO MEAS - ESV(CUBED): 21 ML
BH CV ECHO MEAS - ESV(MOD-SP2): 35 ML
BH CV ECHO MEAS - ESV(MOD-SP4): 46 ML
BH CV ECHO MEAS - FS: 35.2 %
BH CV ECHO MEAS - IVS/LVPW: 1.08 CM
BH CV ECHO MEAS - IVSD: 1.26 CM
BH CV ECHO MEAS - LAT PEAK E' VEL: 14.9 CM/SEC
BH CV ECHO MEAS - LV DIASTOLIC VOL/BSA (35-75): 69.8 CM2
BH CV ECHO MEAS - LV MASS(C)D: 184.6 GRAMS
BH CV ECHO MEAS - LV MAX PG: 4.6 MMHG
BH CV ECHO MEAS - LV MEAN PG: 2.49 MMHG
BH CV ECHO MEAS - LV SYSTOLIC VOL/BSA (12-30): 27.9 CM2
BH CV ECHO MEAS - LV V1 MAX: 106.7 CM/SEC
BH CV ECHO MEAS - LV V1 VTI: 19.4 CM
BH CV ECHO MEAS - LVIDD: 4.3 CM
BH CV ECHO MEAS - LVIDS: 2.8 CM
BH CV ECHO MEAS - LVOT AREA: 2.7 CM2
BH CV ECHO MEAS - LVOT DIAM: 1.86 CM
BH CV ECHO MEAS - LVPWD: 1.16 CM
BH CV ECHO MEAS - MED PEAK E' VEL: 8.3 CM/SEC
BH CV ECHO MEAS - MV A DUR: 0.2 SEC
BH CV ECHO MEAS - MV A MAX VEL: 60.4 CM/SEC
BH CV ECHO MEAS - MV DEC SLOPE: 662 CM/SEC2
BH CV ECHO MEAS - MV DEC TIME: 0.2 MSEC
BH CV ECHO MEAS - MV E MAX VEL: 132 CM/SEC
BH CV ECHO MEAS - MV E/A: 2.18
BH CV ECHO MEAS - MV MAX PG: 11 MMHG
BH CV ECHO MEAS - MV MEAN PG: 3.6 MMHG
BH CV ECHO MEAS - MV V2 VTI: 30.2 CM
BH CV ECHO MEAS - MVA(VTI): 1.75 CM2
BH CV ECHO MEAS - PA V2 MAX: 101.8 CM/SEC
BH CV ECHO MEAS - PI END-D VEL: 114 CM/SEC
BH CV ECHO MEAS - PULM A REVS DUR: 0.14 SEC
BH CV ECHO MEAS - PULM A REVS VEL: 30.5 CM/SEC
BH CV ECHO MEAS - PULM DIAS VEL: 37.6 CM/SEC
BH CV ECHO MEAS - PULM SYS VEL: 30.9 CM/SEC
BH CV ECHO MEAS - RAP SYSTOLE: 3 MMHG
BH CV ECHO MEAS - RV MAX PG: 1.48 MMHG
BH CV ECHO MEAS - RV V1 MAX: 60.8 CM/SEC
BH CV ECHO MEAS - RV V1 VTI: 12.2 CM
BH CV ECHO MEAS - RVOT DIAM: 2.19 CM
BH CV ECHO MEAS - RVSP: 41.3 MMHG
BH CV ECHO MEAS - SI(MOD-SP2): 33.4 ML/M2
BH CV ECHO MEAS - SI(MOD-SP4): 41.9 ML/M2
BH CV ECHO MEAS - SV(LVOT): 52.8 ML
BH CV ECHO MEAS - SV(MOD-SP2): 55 ML
BH CV ECHO MEAS - SV(MOD-SP4): 69 ML
BH CV ECHO MEAS - SV(RVOT): 45.9 ML
BH CV ECHO MEAS - TAPSE (>1.6): 1.7 CM
BH CV ECHO MEAS - TR MAX PG: 38.3 MMHG
BH CV ECHO MEAS - TR MAX VEL: 309.5 CM/SEC
BH CV ECHO MEASUREMENTS AVERAGE E/E' RATIO: 11.38
BH CV XLRA - RV BASE: 3.8 CM
BH CV XLRA - RV LENGTH: 6.6 CM
BH CV XLRA - RV MID: 2.9 CM
BH CV XLRA - TDI S': 18.5 CM/SEC
LEFT ATRIUM VOLUME INDEX: 57 ML/M2
LV EF 2D ECHO EST: 60 %
MAXIMAL PREDICTED HEART RATE: 132 BPM
SINUS: 2.8 CM
STJ: 2.2 CM
STRESS TARGET HR: 112 BPM

## 2022-03-11 ENCOUNTER — OFFICE VISIT (OUTPATIENT)
Dept: CARDIOLOGY | Facility: CLINIC | Age: 87
End: 2022-03-11

## 2022-03-11 VITALS
SYSTOLIC BLOOD PRESSURE: 140 MMHG | WEIGHT: 144.8 LBS | BODY MASS INDEX: 27.34 KG/M2 | DIASTOLIC BLOOD PRESSURE: 78 MMHG | HEART RATE: 80 BPM | HEIGHT: 61 IN

## 2022-03-11 DIAGNOSIS — I25.5 ISCHEMIC CARDIOMYOPATHY: ICD-10-CM

## 2022-03-11 DIAGNOSIS — Z95.0 CARDIAC PACEMAKER IN SITU: Primary | ICD-10-CM

## 2022-03-11 DIAGNOSIS — Z95.820 S/P ANGIOPLASTY WITH STENT: ICD-10-CM

## 2022-03-11 DIAGNOSIS — I21.19 ACUTE MI, INFERIOR WALL: ICD-10-CM

## 2022-03-11 PROCEDURE — 99214 OFFICE O/P EST MOD 30 MIN: CPT | Performed by: INTERNAL MEDICINE

## 2022-03-11 PROCEDURE — 93000 ELECTROCARDIOGRAM COMPLETE: CPT | Performed by: INTERNAL MEDICINE

## 2022-03-11 NOTE — PROGRESS NOTES
Date of Office Visit: 22  Encounter Provider: Melquiades Root MD  Place of Service: [facility] CARDIOLOGY  Patient Name: Vicky Stewart  :10/18/1933  [id number]    Chief Complaint   Patient presents with   • Shortness of Breath   :     HPI: Vicky Stewart is a 88 y.o. female  who had inferolateral myocardial infarction in 2020.  At that time we stopped her STEMI and placed a 3.5 x 15 mm length and 3.5x 28 Xience eluding stent mid RCA.  She also has significant distal left main lesion some proximal LAD disease.  She is not really a candidate for surgical intervention.  We have been managing her medically.    In 2020 we ended up bringing her back to the Cath Lab for what we thought was recurrent angina and we did a rotational atherectomy and crushing drug-eluting stenting in the left main into the LAD and circumflex.  She is also had a history of a permanent pacemaker and atrial fibrillation.  Echocardiography in 2020 showed an ejection fraction of 40 to 45% with aortic sclerosis moderate to severe tricuspid insufficiency.  Normal pulmonary pressures.  She has some other small vessel coronary disease that we have managed medically.  She has COPD and its been difficult in the past trying to figure out if some of her shortness of breath is from that or from her heart.    She continues to have some shortness of breath she goes to see her lung doctors and they tell her that it is not from her lungs and that is from her heart or A. fib.  INR really think it is from that her A. fib is well controlled.  She could have small vessel disease but we tried her on nitrates long-acting that made her feel worse she does not have PND orthopnea edema she has not had any changes in her weight.    Past Medical History:   Diagnosis Date   • Acute MI, inferior wall (HCC) 2020   • Anemia    • Atrial fibrillation (HCC)    • Atrial flutter (HCC)    • AV block, 3rd degree  ([facility])    • Carotid artery stenosis    • Chronic systolic heart failure ([facility])    • Dermatitis    • GERD (gastroesophageal reflux disease)    • History of EKG 06/17/2016   • Hyperlipidemia    • Hypertension    • Ischemic cardiomyopathy    • Low back pain    • Osteoarthritis     knee   • Osteoporosis    • Pacemaker    • Palpitations    • Persistent atrial fibrillation ([facility])     on warfarin   • Raynaud's phenomenon    • SOB (shortness of breath) on exertion    • SSS (sick sinus syndrome) ([facility])    • URI, acute    • Venous insufficiency        Past Surgical History:   Procedure Laterality Date   • BILE DUCT STENT PLACEMENT      choledochal   • BREAST BIOPSY Left     benign   • CARDIAC CATHETERIZATION N/A 3/14/2020    Procedure: Coronary angiography;  Surgeon: Melquiades Root MD;  Location: [facility] CATH INVASIVE LOCATION;  Service: Cardiovascular;  Laterality: N/A;   • CARDIAC CATHETERIZATION  3/14/2020    Procedure: Percutaneous Manual Thrombectomy;  Surgeon: Melquiades Root MD;  Location: [facility] CATH INVASIVE LOCATION;  Service: Cardiovascular;;   • CARDIAC CATHETERIZATION N/A 3/14/2020    Procedure: Stent CAROLYNN coronary;  Surgeon: Melquiades Root MD;  Location: [facility] CATH INVASIVE LOCATION;  Service: Cardiovascular;  Laterality: N/A;   • CARDIAC CATHETERIZATION Right 7/9/2020    Procedure: Percutaneous Coronary Intervention;  Surgeon: Melquiades Root MD;  Location: [facility] CATH INVASIVE LOCATION;  Service: Cardiovascular;  Laterality: Right;  unprotected L main PCI.  8 Fr sheath in R CFA    • CARDIAC CATHETERIZATION N/A 7/9/2020    Procedure: Stent CAROLYNN coronary;  Surgeon: Melquiades Root MD;  Location: [facility] CATH INVASIVE LOCATION;  Service: Cardiovascular;  Laterality: N/A;   • CARDIAC CATHETERIZATION N/A 7/9/2020    Procedure: Atherectomy-coronary;  Surgeon: Melquiades Root MD;  Location: [facility] CATH INVASIVE LOCATION;  Service: Cardiovascular;  Laterality: N/A;   • CARDIAC ELECTROPHYSIOLOGY PROCEDURE N/A  2016    Procedure: Lead Revision PPM BOSTON;  Surgeon: Kale Méndez MD;  Location: [facility] INVASIVE LOCATION;  Service:    • CATARACT EXTRACTION W/ INTRAOCULAR LENS  IMPLANT, BILATERAL Bilateral    • CHOLECYSTECTOMY     • COLONOSCOPY  2012   • FACIAL COSMETIC SURGERY     • OTHER SURGICAL HISTORY      interrogation of implantable loop recorder remote, up to 30 days   • PACEMAKER IMPLANTATION     • SINUS SURGERY         Social History     Socioeconomic History   • Marital status:    Tobacco Use   • Smoking status: Former Smoker     Packs/day: 1.50     Start date:      Quit date:      Years since quittin.1   • Smokeless tobacco: Never Used   • Tobacco comment: CAFFEINE USE: 3 CUPS DECAF COFFEE DAILY   Substance and Sexual Activity   • Alcohol use: No   • Drug use: No   • Sexual activity: Defer     Comment:        Family History   Problem Relation Age of Onset   • Heart disease Mother    • Stroke Mother    • Heart disease Father    • Diabetes Sister    • Hodgkin's lymphoma Sister    • Heart disease Sister         pacemaker   • Heart disease Other    • Stroke Other    • Endometrial cancer Sister        Review of Systems   Constitutional: Negative for decreased appetite, fever, malaise/fatigue and weight loss.   HENT: Negative for nosebleeds.    Eyes: Negative for double vision.   Cardiovascular: Negative for chest pain, claudication, cyanosis, dyspnea on exertion, irregular heartbeat, leg swelling, near-syncope, orthopnea, palpitations, paroxysmal nocturnal dyspnea and syncope.   Respiratory: Negative for cough, hemoptysis and shortness of breath.    Hematologic/Lymphatic: Negative for bleeding problem.   Skin: Negative for rash.   Musculoskeletal: Negative for falls and myalgias.   Gastrointestinal: Negative for hematochezia, jaundice, melena, nausea and vomiting.   Genitourinary: Negative for hematuria.   Neurological: Negative for dizziness and seizures.    Psychiatric/Behavioral: Negative for altered mental status and memory loss.       Allergies   Allergen Reactions   • Carvedilol Other (See Comments)     MAKES HER VERY COLD   • Codeine Nausea And Vomiting   • Lisinopril Dizziness   • Penicillins Swelling   • Ranexa [Ranolazine Er] Headache   • Sulfa Antibiotics Nausea And Vomiting   • Tramadol Nausea And Vomiting         Current Outpatient Medications:   •  acetaminophen (TYLENOL) 325 MG tablet, Take 325 mg by mouth 2 (two) times a day as needed for mild pain (1-3)., Disp: , Rfl:   •  atorvastatin (LIPITOR) 40 MG tablet, TAKE 1 TABLET BY MOUTH EVERY DAY, Disp: 90 tablet, Rfl: 0  •  carbidopa-levodopa (SINEMET)  MG per tablet, Take 1 tablet by mouth Every Night., Disp: , Rfl: 11  •  Cholecalciferol (VITAMIN D3) 5000 UNITS capsule capsule, Take 5,000 Units by mouth As Needed., Disp: , Rfl:   •  clopidogrel (PLAVIX) 75 MG tablet, TAKE 1 TABLET BY MOUTH EVERY DAY, Disp: 90 tablet, Rfl: 3  •  ferrous gluconate (FERGON) 324 MG tablet, Take 324 mg by mouth Daily With Breakfast., Disp: , Rfl:   •  fluticasone (FLONASE) 50 MCG/ACT nasal spray, 2 sprays into each nostril daily., Disp: , Rfl:   •  furosemide (LASIX) 40 MG tablet, TAKE 1 TABLET BY MOUTH EVERY DAY, Disp: 90 tablet, Rfl: 3  •  loratadine (CLARITIN) 10 MG tablet, Take 1 tablet by mouth daily., Disp: , Rfl:   •  nitroglycerin (NITROSTAT) 0.4 MG SL tablet, PLACE 1 TABLET UNDER TONGUE EVERY 5 MINS, UP TO 3 DOSES AS NEEDED FOR CHEST PAIN, Disp: 100 tablet, Rfl: 11  •  pantoprazole (PROTONIX) 40 MG EC tablet, TAKE 1 TABLET BY MOUTH EVERY DAY, Disp: 30 tablet, Rfl: 11  •  Probiotic Product (PROBIOTIC DAILY PO), Take  by mouth., Disp: , Rfl:   •  pyridoxine (B-6) 200 MG tablet, Take 1 tablet by mouth daily., Disp: , Rfl:   •  saccharomyces boulardii (FLORASTOR) 250 MG capsule, Take 1 capsule by mouth 2 (Two) Times a Day., Disp: , Rfl:   •  spironolactone (ALDACTONE) 25 MG tablet, TAKE 1 TABLET BY MOUTH EVERY DAY,  "Disp: 90 tablet, Rfl: 2  •  temazepam (RESTORIL) 7.5 MG capsule, 15 mg., Disp: , Rfl: 1  •  Umeclidinium-Vilanterol 62.5-25 MCG/INH aerosol powder , Inhale 1 puff Daily., Disp: , Rfl:   •  VENTOLIN  (90 BASE) MCG/ACT inhaler, INHALE 2 PUFFS BY MOUTH EVERY 4 HOURS AS NEEDED, Disp: , Rfl: 4  •  warfarin (COUMADIN) 2.5 MG tablet, Atrial fibrillation  Indications: Atrial Fibrillation (Patient taking differently: Every Night. Atrial fibrillation  Indications: Atrial Fibrillation), Disp: 30 tablet, Rfl: 3  •  warfarin (COUMADIN) 5 MG tablet, Atrial fibrillation  Indications: Atrial Fibrillation, Disp: 30 tablet, Rfl: 3    Current Facility-Administered Medications:   •  sodium chloride 0.9 % flush 10 mL, 10 mL, Intravenous, PRN, Quita Mahoney, APRN      Objective:     Vitals:    03/11/22 1444   BP: 140/78   Pulse: 80   Weight: 65.7 kg (144 lb 12.8 oz)   Height: 154.9 cm (61\")     Body mass index is 27.36 kg/m².    Constitutional:       Appearance: Well-developed.   Eyes:      General: No scleral icterus.  HENT:      Head: Normocephalic.   Neck:      Thyroid: No thyromegaly.      Vascular: No JVD.      Lymphadenopathy: No cervical adenopathy.   Pulmonary:      Effort: Pulmonary effort is normal.      Breath sounds: Normal breath sounds. No wheezing. No rales.   Chest:       Cardiovascular:      Normal rate. Regular rhythm.      No gallop.   Edema:     Peripheral edema absent.   Abdominal:      Palpations: Abdomen is soft.      Tenderness: There is no abdominal tenderness.   Musculoskeletal: Normal range of motion. Skin:     General: Skin is warm and dry.      Findings: No rash.   Neurological:      Mental Status: Alert and oriented to person, place, and time.           ECG 12 Lead    Date/Time: 3/11/2022 3:24 PM  Performed by: Melquiades Root MD  Authorized by: Melquiades Root MD   Comparison: compared with previous ECG   Similar to previous ECG  Rhythm: atrial fibrillation and paced    Clinical impression: " abnormal EKG             Assessment:       Diagnosis Plan   1. Cardiac pacemaker in situ     2. S/P angioplasty with stent     3. Acute MI, inferior wall (HCC)     4. Ischemic cardiomyopathy            Plan:       We I think at this point she probably is just going to have a little shortness of breath and that were not in a probably figured out to be honest but its been stable her echo looks good her meds are good we have done some complex stenting over hoping that that would help and it did not really make much change so I recommend changing them and have her come back and see me in August sooner if she has trouble    As always, it has been a pleasure to participate in your patient's care.      Sincerely,       Melquiades Root MD

## 2022-03-18 RX ORDER — PANTOPRAZOLE SODIUM 40 MG/1
TABLET, DELAYED RELEASE ORAL
Qty: 90 TABLET | Refills: 3 | Status: SHIPPED | OUTPATIENT
Start: 2022-03-18

## 2022-03-23 ENCOUNTER — APPOINTMENT (OUTPATIENT)
Dept: CARDIOLOGY | Facility: HOSPITAL | Age: 87
End: 2022-03-23

## 2022-04-13 ENCOUNTER — APPOINTMENT (OUTPATIENT)
Dept: WOMENS IMAGING | Facility: HOSPITAL | Age: 87
End: 2022-04-13

## 2022-04-13 PROCEDURE — 77067 SCR MAMMO BI INCL CAD: CPT | Performed by: RADIOLOGY

## 2022-04-13 PROCEDURE — 77063 BREAST TOMOSYNTHESIS BI: CPT | Performed by: RADIOLOGY

## 2022-05-09 PROCEDURE — 93296 REM INTERROG EVL PM/IDS: CPT | Performed by: INTERNAL MEDICINE

## 2022-05-09 PROCEDURE — 93294 REM INTERROG EVL PM/LDLS PM: CPT | Performed by: INTERNAL MEDICINE

## 2022-06-07 ENCOUNTER — OFFICE VISIT (OUTPATIENT)
Dept: CARDIOLOGY | Facility: CLINIC | Age: 87
End: 2022-06-07

## 2022-06-07 ENCOUNTER — HOSPITAL ENCOUNTER (OUTPATIENT)
Dept: CARDIOLOGY | Facility: HOSPITAL | Age: 87
Discharge: HOME | End: 2022-06-07
Admitting: NURSE PRACTITIONER

## 2022-06-07 ENCOUNTER — CLINICAL SUPPORT NO REQUIREMENTS (OUTPATIENT)
Dept: CARDIOLOGY | Facility: CLINIC | Age: 87
End: 2022-06-07

## 2022-06-07 VITALS
DIASTOLIC BLOOD PRESSURE: 82 MMHG | WEIGHT: 138 LBS | SYSTOLIC BLOOD PRESSURE: 140 MMHG | HEIGHT: 61 IN | BODY MASS INDEX: 26.06 KG/M2 | HEART RATE: 80 BPM

## 2022-06-07 DIAGNOSIS — R06.09 DYSPNEA ON EXERTION: Primary | ICD-10-CM

## 2022-06-07 DIAGNOSIS — I25.5 ISCHEMIC CARDIOMYOPATHY: ICD-10-CM

## 2022-06-07 DIAGNOSIS — I48.21 PERMANENT ATRIAL FIBRILLATION: ICD-10-CM

## 2022-06-07 DIAGNOSIS — E78.00 PURE HYPERCHOLESTEROLEMIA: ICD-10-CM

## 2022-06-07 DIAGNOSIS — I44.2 THIRD DEGREE AV BLOCK: Primary | ICD-10-CM

## 2022-06-07 DIAGNOSIS — I10 PRIMARY HYPERTENSION: ICD-10-CM

## 2022-06-07 DIAGNOSIS — R06.09 DYSPNEA ON EXERTION: ICD-10-CM

## 2022-06-07 LAB
ALBUMIN SERPL-MCNC: 4.5 G/DL (ref 3.5–5.2)
ALBUMIN/GLOB SERPL: 2.3 G/DL
ALP SERPL-CCNC: 58 U/L (ref 39–117)
ALT SERPL W P-5'-P-CCNC: 24 U/L (ref 1–33)
ANION GAP SERPL CALCULATED.3IONS-SCNC: 12 MMOL/L (ref 5–15)
AST SERPL-CCNC: 24 U/L (ref 1–32)
BILIRUB SERPL-MCNC: 0.7 MG/DL (ref 0–1.2)
BUN SERPL-MCNC: 20 MG/DL (ref 8–23)
BUN/CREAT SERPL: 23.8 (ref 7–25)
CALCIUM SPEC-SCNC: 9.8 MG/DL (ref 8.6–10.5)
CHLORIDE SERPL-SCNC: 103 MMOL/L (ref 98–107)
CO2 SERPL-SCNC: 24 MMOL/L (ref 22–29)
CREAT SERPL-MCNC: 0.84 MG/DL (ref 0.57–1)
DEPRECATED RDW RBC AUTO: 42.1 FL (ref 37–54)
EGFRCR SERPLBLD CKD-EPI 2021: 66.9 ML/MIN/1.73
ERYTHROCYTE [DISTWIDTH] IN BLOOD BY AUTOMATED COUNT: 14.7 % (ref 12.3–15.4)
GLOBULIN UR ELPH-MCNC: 2 GM/DL
GLUCOSE SERPL-MCNC: 89 MG/DL (ref 65–99)
HCT VFR BLD AUTO: 33 % (ref 34–46.6)
HGB BLD-MCNC: 10.6 G/DL (ref 12–15.9)
MCH RBC QN AUTO: 25.9 PG (ref 26.6–33)
MCHC RBC AUTO-ENTMCNC: 32.1 G/DL (ref 31.5–35.7)
MCV RBC AUTO: 80.5 FL (ref 79–97)
NT-PROBNP SERPL-MCNC: 807 PG/ML (ref 0–1800)
PLATELET # BLD AUTO: 250 10*3/MM3 (ref 140–450)
PMV BLD AUTO: 10.9 FL (ref 6–12)
POTASSIUM SERPL-SCNC: 4 MMOL/L (ref 3.5–5.2)
PROT SERPL-MCNC: 6.5 G/DL (ref 6–8.5)
RBC # BLD AUTO: 4.1 10*6/MM3 (ref 3.77–5.28)
SODIUM SERPL-SCNC: 139 MMOL/L (ref 136–145)
TSH SERPL DL<=0.05 MIU/L-ACNC: 1.15 UIU/ML (ref 0.27–4.2)
WBC NRBC COR # BLD: 5.92 10*3/MM3 (ref 3.4–10.8)

## 2022-06-07 PROCEDURE — 83880 ASSAY OF NATRIURETIC PEPTIDE: CPT | Performed by: NURSE PRACTITIONER

## 2022-06-07 PROCEDURE — 80053 COMPREHEN METABOLIC PANEL: CPT | Performed by: NURSE PRACTITIONER

## 2022-06-07 PROCEDURE — 93000 ELECTROCARDIOGRAM COMPLETE: CPT | Performed by: NURSE PRACTITIONER

## 2022-06-07 PROCEDURE — 84443 ASSAY THYROID STIM HORMONE: CPT | Performed by: NURSE PRACTITIONER

## 2022-06-07 PROCEDURE — 93279 PRGRMG DEV EVAL PM/LDLS PM: CPT | Performed by: INTERNAL MEDICINE

## 2022-06-07 PROCEDURE — 99214 OFFICE O/P EST MOD 30 MIN: CPT | Performed by: NURSE PRACTITIONER

## 2022-06-07 PROCEDURE — 85027 COMPLETE CBC AUTOMATED: CPT | Performed by: NURSE PRACTITIONER

## 2022-06-07 PROCEDURE — 36415 COLL VENOUS BLD VENIPUNCTURE: CPT

## 2022-06-07 RX ORDER — ISOSORBIDE MONONITRATE 30 MG/1
30 TABLET, EXTENDED RELEASE ORAL DAILY
Qty: 30 TABLET | Refills: 11 | Status: SHIPPED | OUTPATIENT
Start: 2022-06-07 | End: 2022-06-08 | Stop reason: SDUPTHER

## 2022-06-07 NOTE — PROGRESS NOTES
[facility] Follow Up Office Note     Encounter Date:22  Patient:Vicky Stewart  :10/18/1933  MRN:[id number]      Chief Complaint:   Chief Complaint   Patient presents with   • Shortness of Breath         History of Presenting Illness:        Vicky Stewart is a 88 y.o. female who is here for follow-up of CAD, shortness of breath.  She is a patient of Dr. Root.    Patient has past medical history significant for coronary artery disease, ischemic cardiomyopathy, COPD, mitral regurgitation, aortic stenosis, hypertension, third-degree AV block status post permanent pacemaker, persistent atrial fibrillation on anticoagulation.  In 2020 patient presented with inferior-anterior lateral STEMI and is status post CAROLYNN to mid RCA.  She also had noted left main and LAD disease.  She was evaluated by cardiac surgery but was not a surgical candidate.  She continued to have anginal symptoms despite medical therapy and in 2020 underwent high risk PCI of left main and LAD.    Patient has chronic shortness of breath which has not significantly improved with PCI's.  Her pulmonologist does not feel that it is from her lungs and feels like it is cardiac problem, however she has been compensated from a heart failure standpoint and her A. fib is well controlled.  She has known small vessel disease but has previously not tolerated Imdur.    Patient had echocardiogram 3/2022 demonstrating normalized LVEF of 60%, moderate tricuspid regurgitation, mild to moderate mitral valve regurgitation, and hypokinetic apical inferior, apical septal walls and apex.    Patient is being seen today at the request of Dr. Leong at [facility] for increasing shortness of breath.    Patient reports her shortness of breath has progressively become worse than when saw Dr. Root 3 months ago.  She states she can barely walk without becoming short of breath.  She states she cannot do daily activities without shortness of breath  "and fatigue.  She denies shortness of breath at rest. She has chest \"aches\" that come at random and last several minutes.  She recently \"popped\" varicose vein and has a lot of swelling in RLE, and some swelling in left.  She wears compression socks. She is following with Dr. Owen, vascular surgery.  She denies orthopnea, she states she has been losing weight.  She reports frequent palpitations.    Review of Systems:  Review of Systems   Cardiovascular: Positive for dyspnea on exertion. Negative for chest pain, leg swelling, orthopnea and palpitations.   Respiratory: Positive for shortness of breath.        Current Outpatient Medications on File Prior to Visit   Medication Sig Dispense Refill   • acetaminophen (TYLENOL) 325 MG tablet Take 325 mg by mouth 2 (two) times a day as needed for mild pain (1-3).     • atorvastatin (LIPITOR) 40 MG tablet TAKE 1 TABLET BY MOUTH EVERY DAY 90 tablet 0   • carbidopa-levodopa (SINEMET)  MG per tablet Take 1 tablet by mouth Every Night.  11   • Cholecalciferol (VITAMIN D3) 5000 UNITS capsule capsule Take 5,000 Units by mouth As Needed.     • clopidogrel (PLAVIX) 75 MG tablet TAKE 1 TABLET BY MOUTH EVERY DAY 90 tablet 3   • ferrous gluconate (FERGON) 324 MG tablet Take 324 mg by mouth Daily With Breakfast.     • fluticasone (FLONASE) 50 MCG/ACT nasal spray 2 sprays into each nostril daily.     • furosemide (LASIX) 40 MG tablet TAKE 1 TABLET BY MOUTH EVERY DAY 90 tablet 3   • loratadine (CLARITIN) 10 MG tablet Take 1 tablet by mouth daily.     • nitroglycerin (NITROSTAT) 0.4 MG SL tablet PLACE 1 TABLET UNDER TONGUE EVERY 5 MINS, UP TO 3 DOSES AS NEEDED FOR CHEST PAIN 100 tablet 11   • pantoprazole (PROTONIX) 40 MG EC tablet TAKE 1 TABLET BY MOUTH EVERY DAY 90 tablet 3   • Probiotic Product (PROBIOTIC DAILY PO) Take  by mouth.     • pyridoxine (B-6) 200 MG tablet Take 1 tablet by mouth daily.     • saccharomyces boulardii (FLORASTOR) 250 MG capsule Take 1 capsule by mouth 2 " (Two) Times a Day.     • spironolactone (ALDACTONE) 25 MG tablet TAKE 1 TABLET BY MOUTH EVERY DAY 90 tablet 2   • temazepam (RESTORIL) 7.5 MG capsule 15 mg.  1   • Umeclidinium-Vilanterol 62.5-25 MCG/INH aerosol powder  Inhale 1 puff Daily.     • VENTOLIN  (90 BASE) MCG/ACT inhaler INHALE 2 PUFFS BY MOUTH EVERY 4 HOURS AS NEEDED  4   • warfarin (COUMADIN) 2.5 MG tablet Atrial fibrillation  Indications: Atrial Fibrillation (Patient taking differently: Every Night. Atrial fibrillation  Indications: Atrial Fibrillation) 30 tablet 3   • warfarin (COUMADIN) 5 MG tablet Atrial fibrillation  Indications: Atrial Fibrillation 30 tablet 3     Current Facility-Administered Medications on File Prior to Visit   Medication Dose Route Frequency Provider Last Rate Last Admin   • sodium chloride 0.9 % flush 10 mL  10 mL Intravenous PRN Quita Mahoney APRN           Allergies   Allergen Reactions   • Carvedilol Other (See Comments)     MAKES HER VERY COLD   • Codeine Nausea And Vomiting   • Lisinopril Dizziness   • Penicillins Swelling   • Ranexa [Ranolazine Er] Headache   • Sulfa Antibiotics Nausea And Vomiting   • Tramadol Nausea And Vomiting       Past Medical History:   Diagnosis Date   • Acute MI, inferior wall (HCC) 03/2020   • Anemia    • Atrial fibrillation ([facility])    • Atrial flutter ([facility])    • AV block, 3rd degree ([facility])    • Carotid artery stenosis    • Chronic systolic heart failure ([facility])    • Dermatitis    • GERD (gastroesophageal reflux disease)    • History of EKG 06/17/2016   • Hyperlipidemia    • Hypertension    • Ischemic cardiomyopathy    • Low back pain    • Osteoarthritis     knee   • Osteoporosis    • Pacemaker    • Palpitations    • Persistent atrial fibrillation (HCC)     on warfarin   • Raynaud's phenomenon    • SOB (shortness of breath) on exertion    • SSS (sick sinus syndrome) ([facility])    • URI, acute    • Venous insufficiency        Past Surgical History:   Procedure Laterality Date   • BILE DUCT STENT  PLACEMENT      choledochal   • BREAST BIOPSY Left     benign   • CARDIAC CATHETERIZATION N/A 3/14/2020    Procedure: Coronary angiography;  Surgeon: Melquiades Root MD;  Location: [facility] CATH INVASIVE LOC[facility];  Service: Cardiovascular;  Laterality: N/A;   • CARDIAC CATHETERIZATION  3/14/2020    Procedure: Percutaneous Manual Thrombectomy;  Surgeon: Melquiades Root MD;  Location:  [facility] CATH INVASIVE LOCATION;  Service: Cardiovascular;;   • CARDIAC CATHETERIZATION N/A 3/14/2020    Procedure: Stent CAROLYNN coronary;  Surgeon: Melquiades Root MD;  Location: [facility] CATH INVASIVE LOCATION;  Service: Cardiovascular;  Laterality: N/A;   • CARDIAC CATHETERIZATION Right 7/9/2020    Procedure: Percutaneous Coronary Intervention;  Surgeon: Melquiades Root MD;  Location: [facility] C[facility]VASIVE LOC[facility];  Service: Cardiovascular;  Laterality: Right;  unprotected L main PCI.  8 Fr sheath in R CFA    • CARDIAC CATHETERIZATION N/A 7/9/2020    Procedure: Stent CAROLYNN coronary;  Surgeon: Melquiades Root MD;  Location: [facility] CATH INVASIVE LOCATION;  Service: Cardiovascular;  Laterality: N/A;   • CARDIAC CATHETERIZATION N/A 7/9/2020    Procedure: Atherectomy-coronary;  Surgeon: Melquiades Root MD;  Location: [facility] CATH INVASIVE LOCATION;  Service: Cardiovascular;  Laterality: N/A;   • CARDIAC ELECTROPHYSIOLOGY PROCEDURE N/A 9/14/2016    Procedure: Lead Revision PPM BOSTON;  Surgeon: Kale Méndez MD;  Location: [facility] C[facility]ASIVE LOC[facility];  Service:    • CATARACT EXTRACTION W/ INTRAOCULAR LENS  IMPLANT, BILATERAL Bilateral    • CHOLECYSTECTOMY     • COLONOSCOPY  09/2012   • FACIAL COSMETIC SURGERY     • OTHER SURGICAL HISTORY      interrogation of implantable loop recorder remote, up to 30 days   • PACEMAKER IMPLANTATION     • SINUS SURGERY         Social History     Socioeconomic History   • Marital status:    Tobacco Use   • Smoking status: Former Smoker     Packs/day: 1.50     Start date: 1951     Quit date: 2011     Years  "since quittin.4   • Smokeless tobacco: Never Used   • Tobacco comment: CAFFEINE USE: 3 CUPS DECAF COFFEE DAILY   Substance and Sexual Activity   • Alcohol use: No   • Drug use: No   • Sexual activity: Defer     Comment:        Family History   Problem Relation Age of Onset   • Heart disease Mother    • Stroke Mother    • Heart disease Father    • Diabetes Sister    • Hodgkin's lymphoma Sister    • Heart disease Sister         pacemaker   • Heart disease Other    • Stroke Other    • Endometrial cancer Sister        The following portions of the patient's history were reviewed and updated as appropriate: allergies, current medications, past family history, past medical history, past social history, past surgical history and problem list.       Objective:       Vitals:    22 1310   BP: 140/82   Pulse: 80   Weight: 62.6 kg (138 lb)   Height: 154.9 cm (61\")         Physical Exam:  Constitutional: Alert and conversant  HENT: Oropharynx clear and membrane moist  Eyes: Normal conjunctiva, no sclera icterus  Neck: Supple, no carotid bruit bilaterally  Cardiovascular: Regular rate and rhythm, No Murmur, 1+ bilateral lower extremity edema, > RLE  Pulmonary: Normal respiratory effort, diminished lung sounds, no wheezing  Neurological: Alert and orient x 3  Skin: Warm, dry, no ecchymosis, no rash  Psych: Appropriate mood and affect. Normal judgment and insight         Lab Results   Component Value Date     2020     2020    K 4.2 2020    K 4.0 2020     2020     2020    CO2 24.7 2020    CO2 24.5 2020    BUN 20 2020    BUN 15 2020    CREATININE 0.94 2020    CREATININE 1.01 (H) 2020    EGFRIFNONA 56 (L) 2020    EGFRIFNONA 52 (L) 2020    EGFRIFAFRI 72 2017    EGFRIFAFRI  09/15/2016      Comment:      <15 Indicative of kidney failure.    GLUCOSE 93 2020    GLUCOSE 91 2020    CALCIUM 10.3 " 09/17/2020    CALCIUM 10.1 08/07/2020    PROTENTOTREF 6.4 07/05/2017    ALBUMIN 4.40 09/17/2020    ALBUMIN 3.90 08/07/2020    BILITOT 0.5 09/17/2020    BILITOT 0.6 08/07/2020    AST 23 09/17/2020    AST 22 08/07/2020    ALT 23 09/17/2020    ALT 22 08/07/2020     Lab Results   Component Value Date    WBC 5.14 09/17/2020    WBC 4.45 08/07/2020    HGB 11.4 (L) 09/17/2020    HGB 9.5 (L) 08/07/2020    HCT 34.5 09/17/2020    HCT 29.0 (L) 08/07/2020    MCV 86.0 09/17/2020    MCV 85.3 08/07/2020     09/17/2020     08/07/2020     Lab Results   Component Value Date    CHOL 141 07/10/2020    CHOL 121 03/15/2020    TRIG 62 07/10/2020    TRIG 67 03/15/2020    HDL 67 (H) 07/10/2020    HDL 63 (H) 03/15/2020    LDL 62 07/10/2020    LDL 45 03/15/2020     Lab Results   Component Value Date    PROBNP 779.0 09/17/2020    PROBNP 1,525.0 08/07/2020     Lab Results   Component Value Date    CKMB 37.90 (H) 03/16/2020    TROPONINT <0.010 08/07/2020     Lab Results   Component Value Date    TSH 1.060 03/20/2015           ECG 12 Lead    Date/Time: 6/7/2022 1:39 PM  Performed by: Carlita Cordova APRN  Authorized by: Carlita Cordova APRN   Comparison: compared with previous ECG   Similar to previous ECG  Rhythm: paced  Rate: normal               Assessment:          Diagnosis Plan   1. Dyspnea on exertion  Comprehensive Metabolic Panel    proBNP    CBC (No Diff)    TSH Rfx On Abnormal To Free T4    ECG 12 Lead    XR chest pa and lateral   2. Pure hypercholesterolemia     3. Primary hypertension     4. Ischemic cardiomyopathy     5. Permanent atrial fibrillation (HCC)            Plan:       Dyspnea on exertion:  · This is a chronic problem, however she reports worsening in the last 2 months.  She is tolerating very little activity without becoming short of breath and is now having difficulty completing daily activities.  · She reports she has seen her pulmonologist and he does not think it is related to her lungs  · Patient  had recent echocardiogram 3/2022 that demonstrates normal LVEF with regional wall motion abnormalities, mitral regurgitation is mild to moderate which should not be causing her symptoms, and otherwise she has no significant abnormalities  · Patient has history of coronary disease status post multiple PCI's.  Shortness of breath may be anginal equivalent.  I am starting Imdur.  Patient has previously not tolerated this medication but is agreeable to retry.  I will discuss further with Dr. Root when he returns to see if we should think about stress testing  · Check BNP and other routine labs.  I will call her with the results of these  · I had her device interrogated.  She continues to have high burden of RV pacing and had 1 nonsustained VT episode which she does not recall.  This is stable for her  · Check chest x-ray    Coronary artery disease:  · Continue clopidogrel, Lipitor.  She has been off beta-blocker for intolerance.  I am retrying her on Imdur  · Patient has history of high risk PCI of distal left main into LAD and circumflex in July 2020 by Dr. Root.  She had previous stenting to RCA  · Patient is also had known small vessel disease  · I am starting Imdur today.  Her shortness of breath may be anginal equivalent.  I will discuss further evaluation with Dr. Root on his return    Hypertension:  · BP is mildly elevated today 140/82 mmHg  · Start Imdur    Hyperlipidemia:  · Lipids controlled on recent blood work  · Continue statin    Patient is seen today with reported worsening dyspnea on exertion.  This is been chronic for her but has significantly increased in the last 2 months.  She has COPD, however her pulmonologist does not feel this is lung related.  I am checking lab work today including BNP and basic labs to evaluate for heart failure.  She does not appear decompensated and denies orthopnea.  She is chronically on low-dose oral Lasix.  We can trial increasing this if her BNP is elevated.  She  had recent echocardiogram demonstrating normal LVEF.  Her symptoms may also represent anginal equivalent.  I am retrying Imdur.  Patient is agreeable to do this.  It is unclear why she has not tolerated in the past.  I will discuss stress testing with Dr. Root on his return.  I am also going ordering a chest x-ray today.  We interrogated her device today with normal function.  She had 1 episode of NSVT for which she did not recall.  I will follow-up with patient on results from testing.  She has planned follow-up with Dr. Root in August which she will keep.  She was educated on notification parameters.    Orders Placed This Encounter   Procedures   • XR chest pa and lateral     Standing Status:   Future     Standing Expiration Date:   6/7/2023     Order Specific Question:   Reason for Exam:     Answer:   shortness of breath     Order Specific Question:   Release to patient     Answer:   Immediate   • Comprehensive Metabolic Panel     Standing Status:   Future     Number of Occurrences:   1     Standing Expiration Date:   6/7/2023     Order Specific Question:   Release to patient     Answer:   Immediate   • proBNP     Standing Status:   Future     Number of Occurrences:   1     Standing Expiration Date:   6/7/2023     Order Specific Question:   Release to patient     Answer:   Immediate   • CBC (No Diff)     Standing Status:   Future     Number of Occurrences:   1     Standing Expiration Date:   6/7/2023     Order Specific Question:   Release to patient     Answer:   Immediate   • TSH Rfx On Abnormal To Free T4     Standing Status:   Future     Number of Occurrences:   1     Standing Expiration Date:   6/7/2023     Order Specific Question:   Release to patient     Answer:   Immediate   • ECG 12 Lead     This order was created via procedure documentation     Order Specific Question:   Release to patient     Answer:   Immediate            RONEL Ayala  [facility]  06/07/22  14:51 EDT

## 2022-06-08 ENCOUNTER — HOSPITAL ENCOUNTER (OUTPATIENT)
Dept: GENERAL RADIOLOGY | Facility: HOSPITAL | Age: 87
Discharge: HOME | End: 2022-06-08
Admitting: NURSE PRACTITIONER

## 2022-06-08 DIAGNOSIS — R06.09 DYSPNEA ON EXERTION: ICD-10-CM

## 2022-06-08 PROCEDURE — 71046 X-RAY EXAM CHEST 2 VIEWS: CPT

## 2022-06-08 RX ORDER — ISOSORBIDE MONONITRATE 30 MG/1
30 TABLET, EXTENDED RELEASE ORAL DAILY
Qty: 30 TABLET | Refills: 11 | Status: SHIPPED | OUTPATIENT
Start: 2022-06-08 | End: 2022-08-05 | Stop reason: ALTCHOICE

## 2022-06-08 RX ORDER — SPIRONOLACTONE 25 MG/1
TABLET ORAL
Qty: 90 TABLET | Refills: 2 | Status: SHIPPED | OUTPATIENT
Start: 2022-06-08 | End: 2022-06-09 | Stop reason: SDUPTHER

## 2022-06-09 RX ORDER — SPIRONOLACTONE 25 MG/1
25 TABLET ORAL DAILY
Qty: 90 TABLET | Refills: 2 | Status: SHIPPED | OUTPATIENT
Start: 2022-06-09

## 2022-06-09 NOTE — PROGRESS NOTES
Can you let patient know there are no significant abnormalities on CXR and no evidence of fluid from heart failure.  We do not need to make any changes to her diuretics or other medications.    Thanks!

## 2022-06-27 ENCOUNTER — TELEPHONE (OUTPATIENT)
Dept: CARDIOLOGY | Facility: CLINIC | Age: 87
End: 2022-06-27

## 2022-06-27 NOTE — TELEPHONE ENCOUNTER
Called and spoke with patient to follow-up on her symptoms. She does feel her fatigue and shortness of breath are a little bit improved. No changes made to current regimen.

## 2022-07-29 ENCOUNTER — TRANSCRIBE ORDERS (OUTPATIENT)
Dept: ADMINISTRATIVE | Facility: HOSPITAL | Age: 87
End: 2022-07-29

## 2022-07-29 DIAGNOSIS — D64.9 ANEMIA, UNSPECIFIED TYPE: Primary | ICD-10-CM

## 2022-08-02 PROBLEM — D64.9 ANEMIA, UNSPECIFIED: Status: ACTIVE | Noted: 2022-08-02

## 2022-08-05 ENCOUNTER — HOSPITAL ENCOUNTER (OUTPATIENT)
Dept: INFUSION THERAPY | Facility: HOSPITAL | Age: 87
Discharge: HOME | End: 2022-08-05
Admitting: INTERNAL MEDICINE

## 2022-08-05 VITALS
DIASTOLIC BLOOD PRESSURE: 83 MMHG | TEMPERATURE: 97.1 F | RESPIRATION RATE: 20 BRPM | SYSTOLIC BLOOD PRESSURE: 155 MMHG | OXYGEN SATURATION: 100 % | HEART RATE: 82 BPM

## 2022-08-05 DIAGNOSIS — D64.9 ANEMIA, UNSPECIFIED TYPE: Primary | ICD-10-CM

## 2022-08-05 PROCEDURE — 25010000002 IRON SUCROSE PER 1 MG: Performed by: INTERNAL MEDICINE

## 2022-08-05 PROCEDURE — 96365 THER/PROPH/DIAG IV INF INIT: CPT

## 2022-08-05 RX ADMIN — IRON SUCROSE 200 MG: 20 INJECTION, SOLUTION INTRAVENOUS at 12:15

## 2022-08-17 ENCOUNTER — OFFICE VISIT (OUTPATIENT)
Dept: CARDIOLOGY | Facility: CLINIC | Age: 87
End: 2022-08-17

## 2022-08-17 VITALS
SYSTOLIC BLOOD PRESSURE: 129 MMHG | WEIGHT: 139 LBS | HEART RATE: 80 BPM | DIASTOLIC BLOOD PRESSURE: 77 MMHG | BODY MASS INDEX: 26.24 KG/M2 | HEIGHT: 61 IN

## 2022-08-17 DIAGNOSIS — I42.0 CARDIOMYOPATHY, DILATED: Primary | ICD-10-CM

## 2022-08-17 DIAGNOSIS — E78.00 PURE HYPERCHOLESTEROLEMIA: ICD-10-CM

## 2022-08-17 DIAGNOSIS — Z95.820 S/P ANGIOPLASTY WITH STENT: ICD-10-CM

## 2022-08-17 DIAGNOSIS — I49.5 SICK SINUS SYNDROME: ICD-10-CM

## 2022-08-17 DIAGNOSIS — Z95.0 CARDIAC PACEMAKER IN SITU: ICD-10-CM

## 2022-08-17 DIAGNOSIS — I25.2 HISTORY OF MI (MYOCARDIAL INFARCTION): ICD-10-CM

## 2022-08-17 DIAGNOSIS — I44.2 THIRD DEGREE AV BLOCK: ICD-10-CM

## 2022-08-17 DIAGNOSIS — I25.119 CORONARY ARTERY DISEASE INVOLVING NATIVE CORONARY ARTERY OF NATIVE HEART WITH ANGINA PECTORIS: ICD-10-CM

## 2022-08-17 DIAGNOSIS — I48.21 PERMANENT ATRIAL FIBRILLATION: ICD-10-CM

## 2022-08-17 DIAGNOSIS — I25.5 ISCHEMIC CARDIOMYOPATHY: ICD-10-CM

## 2022-08-17 DIAGNOSIS — I10 PRIMARY HYPERTENSION: ICD-10-CM

## 2022-08-17 DIAGNOSIS — I87.2 CHRONIC VENOUS INSUFFICIENCY: ICD-10-CM

## 2022-08-17 PROBLEM — I21.19 ACUTE MI, INFERIOR WALL (HCC): Status: RESOLVED | Noted: 2020-03-14 | Resolved: 2022-08-17

## 2022-08-17 PROCEDURE — 93000 ELECTROCARDIOGRAM COMPLETE: CPT | Performed by: NURSE PRACTITIONER

## 2022-08-17 PROCEDURE — 99214 OFFICE O/P EST MOD 30 MIN: CPT | Performed by: NURSE PRACTITIONER

## 2022-08-17 RX ORDER — FLUOROURACIL 50 MG/G
CREAM TOPICAL
COMMUNITY
Start: 2022-08-11

## 2022-08-17 RX ORDER — CICLOPIROX OLAMINE 7.7 MG/100ML
SUSPENSION TOPICAL
COMMUNITY
Start: 2022-08-11

## 2022-08-17 RX ORDER — ROPINIROLE 0.5 MG/1
TABLET, FILM COATED ORAL
COMMUNITY
Start: 2022-07-05

## 2022-08-17 NOTE — PROGRESS NOTES
Date of Office Visit: 2022  Encounter Provider: RONEL Campbell  Place of Service: [facility] CARDIOLOGY  Patient Name: Vicky Stewart  :10/18/1933    Chief Complaint   Patient presents with   • Follow-up   • Hypertension   : Dr. Root and is known to me from previous.  She has a history of coronary disease and shortness of breath.  She also has cardiomyopathy, COPD, mitral regurgitation, aortic stenosis, hypertension and third-degree AV block status post permanent pacemaker and persistent A. fib on anticoagulant.  In 2020.  She presented with an inferior anterior wall    HPI: Vicky Stewart is a 88 y.o. female who is a patient of STEMI and had a drug-eluting stent placed to the mid RCA.  She also had a left main and LAD disease.  She was evaluated for bypass but was deemed not a surgical candidate she continued to have anginal symptoms despite medical therapy and in 2020 had a high risk PCI of the left main and LAD.    She had chronic shortness of breath which was not significantly improved after her PCI.  Her pulmonologist did not feel that it was from her lungs but that it was more of a heart related problem.  From a heart related standpoint she has compensated heart failure and her A. fib is well controlled.  She does have known small vessel coronary disease.  She had echocardiogram in March of this year demonstrating normal EF, moderate tricuspid regurg, mild to moderate mitral regurg and hypokinetic apical, inferior, apical septal wall.    She was last in the office in  and her shortness of breath was worsening.  She said she could barely walk without being dyspneic.  She also had had some issues with varicose veins and followed with Dr. Campoverde who recommend losing weight and wearing compression stockings.  To rule out that her dyspnea with exertion with an anticoagulant we put her on some isosorbide to see if this would help her symptoms.   Previous testing and notes have been reviewed by me.     She comes in today for follow-up.  She never started the isosorbide but thankfully she is feeling better.  Her breathing is much better as well as her fatigue.  She denies any chest discomfort.  Her edema has also lessened.  She did have some issues with varicose veins on the right lower extremity and she went to see the vascular doctor.  Past Medical History:   Diagnosis Date   • Acute MI, inferior wall ([facility]) 03/2020   • Anemia    • Atrial fibrillation (HCC)    • Atrial flutter (HCC)    • AV block, 3rd degree (HCC)    • Carotid artery stenosis    • Chronic systolic heart failure (HCC)    • Dermatitis    • GERD (gastroesophageal reflux disease)    • History of EKG 06/17/2016   • Hyperlipidemia    • Hypertension    • Ischemic cardiomyopathy    • Low back pain    • Osteoarthritis     knee   • Osteoporosis    • Pacemaker    • Palpitations    • Persistent atrial fibrillation (HCC)     on warfarin   • Raynaud's phenomenon    • SOB (shortness of breath) on exertion    • SSS (sick sinus syndrome) ([facility])    • URI, acute    • Venous insufficiency        Past Surgical History:   Procedure Laterality Date   • BILE DUCT STENT PLACEMENT      choledochal   • BREAST BIOPSY Left     benign   • CARDIAC CATHETERIZATION N/A 3/14/2020    Procedure: Coronary angiography;  Surgeon: Melquiades Root MD;  Location: [facility] CATH INVASIVE LOCATION;  Service: Cardiovascular;  Laterality: N/A;   • CARDIAC CATHETERIZATION  3/14/2020    Procedure: Percutaneous Manual Thrombectomy;  Surgeon: Melquiades Root MD;  Location: [facility] CATH INVASIVE LOCATION;  Service: Cardiovascular;;   • CARDIAC CATHETERIZATION N/A 3/14/2020    Procedure: Stent CAROLYNN coronary;  Surgeon: Melquiades Root MD;  Location: [facility] CATH INVASIVE LOCATION;  Service: Cardiovascular;  Laterality: N/A;   • CARDIAC CATHETERIZATION Right 7/9/2020    Procedure: Percutaneous Coronary Intervention;  Surgeon: Melquiades Root MD;   Location:  [facility] CATH INVASIVE LOCATION;  Service: Cardiovascular;  Laterality: Right;  unprotected L main PCI.  8 Fr sheath in R CFA    • CARDIAC CATHETERIZATION N/A 2020    Procedure: Stent CAROLYNN coronary;  Surgeon: Melquiades Root MD;  Location: [facility] CATH INVASIVE LOCATION;  Service: Cardiovascular;  Laterality: N/A;   • CARDIAC CATHETERIZATION N/A 2020    Procedure: Atherectomy-coronary;  Surgeon: Melquiades Root MD;  Location: [facility] CATH INVASIVE LOCATION;  Service: Cardiovascular;  Laterality: N/A;   • CARDIAC ELECTROPHYSIOLOGY PROCEDURE N/A 2016    Procedure: Lead Revision PPM BOSTON;  Surgeon: Kale Méndez MD;  Location: [facility] CATH INVASIVE LOCATION;  Service:    • CATARACT EXTRACTION W/ INTRAOCULAR LENS  IMPLANT, BILATERAL Bilateral    • CHOLECYSTECTOMY     • COLONOSCOPY  2012   • FACIAL COSMETIC SURGERY     • OTHER SURGICAL HISTORY      interrogation of implantable loop recorder remote, up to 30 days   • PACEMAKER IMPLANTATION     • SINUS SURGERY         Social History     Socioeconomic History   • Marital status:    Tobacco Use   • Smoking status: Former Smoker     Packs/day: 1.50     Start date:      Quit date:      Years since quittin.6   • Smokeless tobacco: Never Used   • Tobacco comment: CAFFEINE USE: 3 CUPS DECAF COFFEE DAILY   Substance and Sexual Activity   • Alcohol use: No   • Drug use: No   • Sexual activity: Defer     Comment:        Family History   Problem Relation Age of Onset   • Heart disease Mother    • Stroke Mother    • Heart disease Father    • Diabetes Sister    • Hodgkin's lymphoma Sister    • Heart disease Sister         pacemaker   • Heart disease Other    • Stroke Other    • Endometrial cancer Sister        Review of Systems   Constitutional: Negative for diaphoresis and malaise/fatigue.   Cardiovascular: Positive for dyspnea on exertion and leg swelling. Negative for chest pain, claudication, irregular heartbeat, near-syncope,  orthopnea, palpitations, paroxysmal nocturnal dyspnea and syncope.   Respiratory: Negative for cough, shortness of breath and sleep disturbances due to breathing.    Musculoskeletal: Negative for falls.   Neurological: Negative for dizziness and weakness.   Psychiatric/Behavioral: Negative for altered mental status and substance abuse.       Allergies   Allergen Reactions   • Carvedilol Other (See Comments)     MAKES HER VERY COLD   • Codeine Nausea And Vomiting   • Lisinopril Dizziness   • Penicillins Swelling   • Ranexa [Ranolazine Er] Headache   • Sulfa Antibiotics Nausea And Vomiting   • Tramadol Nausea And Vomiting         Current Outpatient Medications:   •  acetaminophen (TYLENOL) 325 MG tablet, Take 325 mg by mouth 2 (two) times a day as needed for mild pain (1-3)., Disp: , Rfl:   •  atorvastatin (LIPITOR) 40 MG tablet, TAKE 1 TABLET BY MOUTH EVERY DAY, Disp: 90 tablet, Rfl: 0  •  carbidopa-levodopa (SINEMET)  MG per tablet, Take 1 tablet by mouth Every Night., Disp: , Rfl: 11  •  Cholecalciferol (VITAMIN D3) 5000 UNITS capsule capsule, Take 5,000 Units by mouth As Needed., Disp: , Rfl:   •  ciclopirox (LOPROX) 0.77 % suspension, 1 APPLICATION ON THE SKIN DAILY APPLY TO THE AFFECTED FINGERNAILS ONCE DAILY FOR 3-4 MONTHS, Disp: , Rfl:   •  clopidogrel (PLAVIX) 75 MG tablet, TAKE 1 TABLET BY MOUTH EVERY DAY, Disp: 90 tablet, Rfl: 3  •  fluorouracil (EFUDEX) 5 % cream, , Disp: , Rfl:   •  fluticasone (FLONASE) 50 MCG/ACT nasal spray, 2 sprays into each nostril daily., Disp: , Rfl:   •  furosemide (LASIX) 40 MG tablet, TAKE 1 TABLET BY MOUTH EVERY DAY, Disp: 90 tablet, Rfl: 3  •  loratadine (CLARITIN) 10 MG tablet, Take 1 tablet by mouth daily., Disp: , Rfl:   •  nitroglycerin (NITROSTAT) 0.4 MG SL tablet, PLACE 1 TABLET UNDER TONGUE EVERY 5 MINS, UP TO 3 DOSES AS NEEDED FOR CHEST PAIN, Disp: 100 tablet, Rfl: 11  •  pantoprazole (PROTONIX) 40 MG EC tablet, TAKE 1 TABLET BY MOUTH EVERY DAY, Disp: 90 tablet,  Rfl: 3  •  pyridoxine (B-6) 200 MG tablet, Take 1 tablet by mouth daily., Disp: , Rfl:   •  rOPINIRole (REQUIP) 0.5 MG tablet, 2 (TWO) TABLET DAILY AS NEEDED, Disp: , Rfl:   •  saccharomyces boulardii (FLORASTOR) 250 MG capsule, Take 1 capsule by mouth 2 (Two) Times a Day., Disp: , Rfl:   •  spironolactone (ALDACTONE) 25 MG tablet, Take 1 tablet by mouth Daily., Disp: 90 tablet, Rfl: 2  •  temazepam (RESTORIL) 7.5 MG capsule, 15 mg., Disp: , Rfl: 1  •  Umeclidinium-Vilanterol 62.5-25 MCG/INH aerosol powder , Inhale 1 puff Daily., Disp: , Rfl:   •  VENTOLIN  (90 BASE) MCG/ACT inhaler, INHALE 2 PUFFS BY MOUTH EVERY 4 HOURS AS NEEDED, Disp: , Rfl: 4  •  warfarin (COUMADIN) 2.5 MG tablet, Atrial fibrillation  Indications: Atrial Fibrillation (Patient taking differently: Every Night. Atrial fibrillation  Indications: Atrial Fibrillation), Disp: 30 tablet, Rfl: 3  •  warfarin (COUMADIN) 5 MG tablet, Atrial fibrillation  Indications: Atrial Fibrillation (Patient taking differently: 5 mg 1 (One) Time Per Week. Atrial fibrillation  Indications: Atrial Fibrillation), Disp: 30 tablet, Rfl: 3    Current Facility-Administered Medications:   •  sodium chloride 0.9 % flush 10 mL, 10 mL, Intravenous, PRN, Quita Mahoney R, APRN      Objective:     There were no vitals filed for this visit.  There is no height or weight on file to calculate BMI.    PHYSICAL EXAM:    Constitutional:       General: Not in acute distress.     Appearance: Normal appearance. Well-developed.   Eyes:      Pupils: Pupils are equal, round, and reactive to light.   HENT:      Head: Normocephalic.   Neck:      Vascular: No carotid bruit or JVD.   Pulmonary:      Effort: Pulmonary effort is normal. No tachypnea.      Breath sounds: Normal breath sounds. No wheezing. No rales.   Cardiovascular:      Normal rate. Irregularly irregular rhythm.      No gallop.   Pulses:     Intact distal pulses.   Edema:     Peripheral edema absent.   Abdominal:      General:  Bowel sounds are normal.      Palpations: Abdomen is soft.      Tenderness: There is no abdominal tenderness.   Musculoskeletal: Normal range of motion.      Cervical back: Normal range of motion and neck supple. No edema. Skin:     General: Skin is warm and dry.   Neurological:      Mental Status: Alert and oriented to person, place, and time.           ECG 12 Lead    Date/Time: 8/17/2022 1:14 PM  Performed by: Munira Morrow APRN  Authorized by: Munira Morrow APRN   Rhythm: atrial fibrillation  Rate: normal  Conduction: non-specific intraventricular conduction delay  Q waves: V1, V2, V3, V4, V5 and V6      Clinical impression: abnormal EKG              Assessment:       Diagnosis Plan   1. Cardiomyopathy, dilated (HCC)     2. Cardiac pacemaker in situ     3. Coronary artery disease involving native coronary artery of native heart with angina pectoris (HCC)     4. Chronic venous insufficiency     5. Pure hypercholesterolemia     6. Primary hypertension     7. Ischemic cardiomyopathy     8. S/P angioplasty with stent     9. Sick sinus syndrome ([facility])     10. Third degree AV block (HCC)     11. Permanent atrial fibrillation (HCC)     12. History of MI (myocardial infarction)       No orders of the defined types were placed in this encounter.         Plan:       I am not can make any changes today she is stable from a cardiac standpoint no angina symptoms.  Her heart rate is well controlled.  I think we can see her back in 6 months sooner if needed.         Your medication list          Accurate as of August 17, 2022  1:02 PM. If you have any questions, ask your nurse or doctor.            CHANGE how you take these medications      Instructions Last Dose Given Next Dose Due   warfarin 5 MG tablet  Commonly known as: COUMADIN  What changed:   · how much to take  · when to take this      Atrial fibrillation  Indications: Atrial Fibrillation       warfarin 2.5 MG tablet  Commonly known as: COUMADIN  What changed:  when to take this      Atrial fibrillation  Indications: Atrial Fibrillation          CONTINUE taking these medications      Instructions Last Dose Given Next Dose Due   acetaminophen 325 MG tablet  Commonly known as: TYLENOL      Take 325 mg by mouth 2 (two) times a day as needed for mild pain (1-3).       atorvastatin 40 MG tablet  Commonly known as: LIPITOR      TAKE 1 TABLET BY MOUTH EVERY DAY       carbidopa-levodopa  MG per tablet  Commonly known as: SINEMET      Take 1 tablet by mouth Every Night.       ciclopirox 0.77 % suspension  Commonly known as: LOPROX      1 APPLICATION ON THE SKIN DAILY APPLY TO THE AFFECTED FINGERNAILS ONCE DAILY FOR 3-4 MONTHS       clopidogrel 75 MG tablet  Commonly known as: PLAVIX      TAKE 1 TABLET BY MOUTH EVERY DAY       fluorouracil 5 % cream  Commonly known as: EFUDEX           fluticasone 50 MCG/ACT nasal spray  Commonly known as: FLONASE      2 sprays into each nostril daily.       furosemide 40 MG tablet  Commonly known as: LASIX      TAKE 1 TABLET BY MOUTH EVERY DAY       loratadine 10 MG tablet  Commonly known as: CLARITIN      Take 1 tablet by mouth daily.       nitroglycerin 0.4 MG SL tablet  Commonly known as: NITROSTAT      PLACE 1 TABLET UNDER TONGUE EVERY 5 MINS, UP TO 3 DOSES AS NEEDED FOR CHEST PAIN       pantoprazole 40 MG EC tablet  Commonly known as: PROTONIX      TAKE 1 TABLET BY MOUTH EVERY DAY       pyridoxine 200 MG tablet  Commonly known as: VITAMIN B-6      Take 1 tablet by mouth daily.       rOPINIRole 0.5 MG tablet  Commonly known as: REQUIP      2 (TWO) TABLET DAILY AS NEEDED       saccharomyces boulardii 250 MG capsule  Commonly known as: FLORASTOR      Take 1 capsule by mouth 2 (Two) Times a Day.       spironolactone 25 MG tablet  Commonly known as: ALDACTONE      Take 1 tablet by mouth Daily.       temazepam 7.5 MG capsule  Commonly known as: RESTORIL      15 mg.       umeclidinium-vilanterol 62.5-25 MCG/INH aerosol powder  inhaler  Commonly  known as: ANORO ELLIPTA      Inhale 1 puff Daily.       Ventolin  (90 Base) MCG/ACT inhaler  Generic drug: albuterol sulfate HFA      INHALE 2 PUFFS BY MOUTH EVERY 4 HOURS AS NEEDED       vitamin D3 125 MCG (5000 UT) capsule capsule      Take 5,000 Units by mouth As Needed.                As always, it has been a pleasure to participate in your patient's care.      Sincerely,     Munira RODNEY

## 2022-08-19 ENCOUNTER — HOSPITAL ENCOUNTER (OUTPATIENT)
Dept: INFUSION THERAPY | Facility: HOSPITAL | Age: 87
Discharge: HOME | End: 2022-08-19
Admitting: INTERNAL MEDICINE

## 2022-08-19 VITALS
SYSTOLIC BLOOD PRESSURE: 150 MMHG | HEART RATE: 78 BPM | OXYGEN SATURATION: 100 % | RESPIRATION RATE: 20 BRPM | DIASTOLIC BLOOD PRESSURE: 80 MMHG | TEMPERATURE: 97.5 F

## 2022-08-19 DIAGNOSIS — D64.9 ANEMIA, UNSPECIFIED TYPE: Primary | ICD-10-CM

## 2022-08-19 PROCEDURE — 96365 THER/PROPH/DIAG IV INF INIT: CPT

## 2022-08-19 PROCEDURE — 25010000002 IRON SUCROSE PER 1 MG: Performed by: INTERNAL MEDICINE

## 2022-08-19 RX ADMIN — IRON SUCROSE 200 MG: 20 INJECTION, SOLUTION INTRAVENOUS at 11:22

## 2022-09-19 NOTE — TELEPHONE ENCOUNTER
Pt stated her bp was 124/80, and yes almost immediately after taking the lisinopril she started to feel bad.     Lisa LEAVITT, CMA   No

## 2022-11-07 PROCEDURE — 93294 REM INTERROG EVL PM/LDLS PM: CPT | Performed by: INTERNAL MEDICINE

## 2022-11-07 PROCEDURE — 93296 REM INTERROG EVL PM/IDS: CPT | Performed by: INTERNAL MEDICINE

## 2022-12-07 RX ORDER — CLOPIDOGREL BISULFATE 75 MG/1
TABLET ORAL
Qty: 90 TABLET | Refills: 3 | Status: SHIPPED | OUTPATIENT
Start: 2022-12-07

## 2023-01-25 ENCOUNTER — HOSPITAL ENCOUNTER (OUTPATIENT)
Dept: CARDIOLOGY | Facility: HOSPITAL | Age: 88
Discharge: HOME | End: 2023-01-25
Admitting: NURSE PRACTITIONER
Payer: MEDICARE

## 2023-01-25 ENCOUNTER — OFFICE VISIT (OUTPATIENT)
Dept: CARDIOLOGY | Facility: CLINIC | Age: 88
End: 2023-01-25
Payer: MEDICARE

## 2023-01-25 VITALS
HEIGHT: 61 IN | BODY MASS INDEX: 25.34 KG/M2 | HEART RATE: 82 BPM | OXYGEN SATURATION: 100 % | SYSTOLIC BLOOD PRESSURE: 140 MMHG | WEIGHT: 134.2 LBS | DIASTOLIC BLOOD PRESSURE: 84 MMHG

## 2023-01-25 DIAGNOSIS — R06.09 DYSPNEA ON EXERTION: ICD-10-CM

## 2023-01-25 DIAGNOSIS — I48.21 PERMANENT ATRIAL FIBRILLATION: ICD-10-CM

## 2023-01-25 DIAGNOSIS — Z95.0 CARDIAC PACEMAKER IN SITU: ICD-10-CM

## 2023-01-25 DIAGNOSIS — I25.10 CORONARY ARTERY DISEASE INVOLVING NATIVE CORONARY ARTERY OF NATIVE HEART, UNSPECIFIED WHETHER ANGINA PRESENT: Primary | ICD-10-CM

## 2023-01-25 DIAGNOSIS — I34.0 MITRAL VALVE INSUFFICIENCY, UNSPECIFIED ETIOLOGY: ICD-10-CM

## 2023-01-25 DIAGNOSIS — I25.5 ISCHEMIC CARDIOMYOPATHY: ICD-10-CM

## 2023-01-25 DIAGNOSIS — I44.2 THIRD DEGREE AV BLOCK: ICD-10-CM

## 2023-01-25 PROBLEM — C44.90 SKIN CANCER: Status: ACTIVE | Noted: 2022-09-14

## 2023-01-25 LAB
ALBUMIN SERPL-MCNC: 4.3 G/DL (ref 3.5–5.2)
ALBUMIN/GLOB SERPL: 2.5 G/DL
ALP SERPL-CCNC: 58 U/L (ref 39–117)
ALT SERPL W P-5'-P-CCNC: 21 U/L (ref 1–33)
ANION GAP SERPL CALCULATED.3IONS-SCNC: 8 MMOL/L (ref 5–15)
AST SERPL-CCNC: 19 U/L (ref 1–32)
BILIRUB SERPL-MCNC: 1.2 MG/DL (ref 0–1.2)
BUN SERPL-MCNC: 21 MG/DL (ref 8–23)
BUN/CREAT SERPL: 23.6 (ref 7–25)
CALCIUM SPEC-SCNC: 9.6 MG/DL (ref 8.6–10.5)
CHLORIDE SERPL-SCNC: 104 MMOL/L (ref 98–107)
CO2 SERPL-SCNC: 28 MMOL/L (ref 22–29)
CREAT SERPL-MCNC: 0.89 MG/DL (ref 0.57–1)
DEPRECATED RDW RBC AUTO: 44.6 FL (ref 37–54)
EGFRCR SERPLBLD CKD-EPI 2021: 62.1 ML/MIN/1.73
ERYTHROCYTE [DISTWIDTH] IN BLOOD BY AUTOMATED COUNT: 13.7 % (ref 12.3–15.4)
GLOBULIN UR ELPH-MCNC: 1.7 GM/DL
GLUCOSE SERPL-MCNC: 90 MG/DL (ref 65–99)
HCT VFR BLD AUTO: 36 % (ref 34–46.6)
HGB BLD-MCNC: 11.5 G/DL (ref 12–15.9)
MCH RBC QN AUTO: 28.7 PG (ref 26.6–33)
MCHC RBC AUTO-ENTMCNC: 31.9 G/DL (ref 31.5–35.7)
MCV RBC AUTO: 89.8 FL (ref 79–97)
NT-PROBNP SERPL-MCNC: 1244 PG/ML (ref 0–1800)
PLATELET # BLD AUTO: 253 10*3/MM3 (ref 140–450)
PMV BLD AUTO: 11.3 FL (ref 6–12)
POTASSIUM SERPL-SCNC: 4.6 MMOL/L (ref 3.5–5.2)
PROT SERPL-MCNC: 6 G/DL (ref 6–8.5)
RBC # BLD AUTO: 4.01 10*6/MM3 (ref 3.77–5.28)
SODIUM SERPL-SCNC: 140 MMOL/L (ref 136–145)
TSH SERPL DL<=0.05 MIU/L-ACNC: 1.48 UIU/ML (ref 0.27–4.2)
WBC NRBC COR # BLD: 9.06 10*3/MM3 (ref 3.4–10.8)

## 2023-01-25 PROCEDURE — 36415 COLL VENOUS BLD VENIPUNCTURE: CPT

## 2023-01-25 PROCEDURE — 83880 ASSAY OF NATRIURETIC PEPTIDE: CPT | Performed by: NURSE PRACTITIONER

## 2023-01-25 PROCEDURE — 84443 ASSAY THYROID STIM HORMONE: CPT | Performed by: NURSE PRACTITIONER

## 2023-01-25 PROCEDURE — 80053 COMPREHEN METABOLIC PANEL: CPT | Performed by: NURSE PRACTITIONER

## 2023-01-25 PROCEDURE — 85027 COMPLETE CBC AUTOMATED: CPT | Performed by: NURSE PRACTITIONER

## 2023-01-25 PROCEDURE — 99214 OFFICE O/P EST MOD 30 MIN: CPT | Performed by: NURSE PRACTITIONER

## 2023-01-25 PROCEDURE — 93000 ELECTROCARDIOGRAM COMPLETE: CPT | Performed by: NURSE PRACTITIONER

## 2023-01-25 NOTE — PROGRESS NOTES
Date of Office Visit: 2023  Encounter Provider: RONEL Kilgore  Place of Service: [facility] CARDIOLOGY  Patient Name: Vicky Stewart  :10/18/1933    Chief Complaint   Patient presents with   • Atrial Fibrillation   :     HPI: Vicky Stewart is a 89 y.o. female.  She is a patient of Dr. Root's whom we follow for hypertension, hyperlipidemia, coronary artery disease, ischemic cardiomyopathy, permanent atrial fibrillation, and complete heart block status post permanent pacemaker.   In 2020, she suffered an inferior-anterolateral STEMI and underwent PCI of the mid RCA.  At that time, she was evaluated by cardiac surgery but was not a surgical candidate.  In 2020, she underwent a high risk PCI of the left main.   Her last echocardiogram was in 2022 at which time she had normal LV systolic function, mild to moderate mitral regurgitation, moderate tricuspid regurgitation, and mild pulmonary hypertension.   She was last seen in the office by RONEL Oleary in 2022 at which time she was overall feeling well.  No changes were made to her regimen, and she was advised to follow-up in 6 months.   Several months ago, she began experiencing exertional breathlessness, fatigue, and increased palpitations.  She reports more of an awareness than a pain in her chest.additionally, she is reporting jaw pain.  Dr. Orr does not think this is a pulmonary issue.  She denies any PND, orthopnea, edema, dizziness, syncope, bleeding difficulties or melena.    Past Medical History:   Diagnosis Date   • Acute MI, inferior wall (HCC) 2020   • Anemia    • Atrial fibrillation (HCC)    • Atrial flutter (HCC)    • AV block, 3rd degree (HCC)    • Carotid artery stenosis    • Chronic systolic heart failure (HCC)    • Dermatitis    • GERD (gastroesophageal reflux disease)    • History of EKG 2016   • Hyperlipidemia    • Hypertension    • Ischemic cardiomyopathy     • Low back pain    • Osteoarthritis     knee   • Osteoporosis    • Pacemaker    • Palpitations    • Persistent atrial fibrillation (HCC)     on warfarin   • Raynaud's phenomenon    • Skin cancer 09/14/2022   • SOB (shortness of breath) on exertion    • SSS (sick sinus syndrome) (HCC)    • URI, acute    • Venous insufficiency        Past Surgical History:   Procedure Laterality Date   • BILE DUCT STENT PLACEMENT      choledochal   • BREAST BIOPSY Left     benign   • CARDIAC CATHETERIZATION N/A 3/14/2020    Procedure: Coronary angiography;  Surgeon: Melquiades Root MD;  Location: [facility] CATH INVASIVE LOCATION;  Service: Cardiovascular;  Laterality: N/A;   • CARDIAC CATHETERIZATION  3/14/2020    Procedure: Percutaneous Manual Thrombectomy;  Surgeon: Melquiades Root MD;  Location: [facility] CATH INVASIVE LOCATION;  Service: Cardiovascular;;   • CARDIAC CATHETERIZATION N/A 3/14/2020    Procedure: Stent CAROLYNN coronary;  Surgeon: Melquiades Root MD;  Location: [facility] CATH INVASIVE LOCATION;  Service: Cardiovascular;  Laterality: N/A;   • CARDIAC CATHETERIZATION Right 7/9/2020    Procedure: Percutaneous Coronary Intervention;  Surgeon: Melquiades Root MD;  Location: [facility] INVASIVE LOCATION;  Service: Cardiovascular;  Laterality: Right;  unprotected L main PCI.  8 Fr sheath in R CFA    • CARDIAC CATHETERIZATION N/A 7/9/2020    Procedure: Stent CAROLYNN coronary;  Surgeon: Melquiades Root MD;  Location: [facility] CATH INVASIVE LOCATION;  Service: Cardiovascular;  Laterality: N/A;   • CARDIAC CATHETERIZATION N/A 7/9/2020    Procedure: Atherectomy-coronary;  Surgeon: Melquiades Root MD;  Location: [facility] CATH INVASIVE LOCATION;  Service: Cardiovascular;  Laterality: N/A;   • CARDIAC ELECTROPHYSIOLOGY PROCEDURE N/A 9/14/2016    Procedure: Lead Revision PPM BOSTON;  Surgeon: Kale Méndez MD;  Location: [facility] CATH INVASIVE LOCATION;  Service:    • CATARACT EXTRACTION W/ INTRAOCULAR LENS  IMPLANT, BILATERAL Bilateral    •  CHOLECYSTECTOMY     • COLONOSCOPY  2012   • FACIAL COSMETIC SURGERY     • OTHER SURGICAL HISTORY      interrogation of implantable loop recorder remote, up to 30 days   • PACEMAKER IMPLANTATION     • SINUS SURGERY         Social History     Socioeconomic History   • Marital status:    Tobacco Use   • Smoking status: Former     Packs/day: 1.50     Types: Cigarettes     Start date:      Quit date:      Years since quittin.0   • Smokeless tobacco: Never   • Tobacco comments:     CAFFEINE USE: 3 CUPS DECAF COFFEE DAILY   Substance and Sexual Activity   • Alcohol use: No   • Drug use: No   • Sexual activity: Defer     Comment:        Family History   Problem Relation Age of Onset   • Heart disease Mother    • Stroke Mother    • Heart disease Father    • Diabetes Sister    • Hodgkin's lymphoma Sister    • Heart disease Sister         pacemaker   • Heart disease Other    • Stroke Other    • Endometrial cancer Sister        Review of Systems   Constitutional: Negative.   Cardiovascular: Positive for chest pain, dyspnea on exertion and palpitations. Negative for leg swelling, orthopnea, paroxysmal nocturnal dyspnea and syncope.   Respiratory: Negative.    Hematologic/Lymphatic: Negative for bleeding problem.   Musculoskeletal: Negative for falls.   Gastrointestinal: Negative for melena.   Neurological: Negative for dizziness and light-headedness.       Allergies   Allergen Reactions   • Carvedilol Other (See Comments)     MAKES HER VERY COLD   • Codeine Nausea And Vomiting   • Lisinopril Dizziness   • Penicillins Swelling   • Ranexa [Ranolazine Er] Headache   • Sulfa Antibiotics Nausea And Vomiting   • Tramadol Nausea And Vomiting         Current Outpatient Medications:   •  acetaminophen (TYLENOL) 325 MG tablet, Take 325 mg by mouth 2 (two) times a day as needed for mild pain (1-3)., Disp: , Rfl:   •  atorvastatin (LIPITOR) 40 MG tablet, TAKE 1 TABLET BY MOUTH EVERY DAY, Disp: 90 tablet, Rfl:  0  •  carbidopa-levodopa (SINEMET)  MG per tablet, Take 1 tablet by mouth Every Night., Disp: , Rfl: 11  •  Cholecalciferol (VITAMIN D3) 5000 UNITS capsule capsule, Take 5,000 Units by mouth As Needed., Disp: , Rfl:   •  ciclopirox (LOPROX) 0.77 % suspension, 1 APPLICATION ON THE SKIN DAILY APPLY TO THE AFFECTED FINGERNAILS ONCE DAILY FOR 3-4 MONTHS, Disp: , Rfl:   •  clopidogrel (PLAVIX) 75 MG tablet, TAKE 1 TABLET BY MOUTH EVERY DAY, Disp: 90 tablet, Rfl: 3  •  fluorouracil (EFUDEX) 5 % cream, , Disp: , Rfl:   •  fluticasone (FLONASE) 50 MCG/ACT nasal spray, 2 sprays into each nostril daily., Disp: , Rfl:   •  furosemide (LASIX) 40 MG tablet, TAKE 1 TABLET BY MOUTH EVERY DAY, Disp: 90 tablet, Rfl: 3  •  loratadine (CLARITIN) 10 MG tablet, Take 1 tablet by mouth daily., Disp: , Rfl:   •  nitroglycerin (NITROSTAT) 0.4 MG SL tablet, PLACE 1 TABLET UNDER TONGUE EVERY 5 MINS, UP TO 3 DOSES AS NEEDED FOR CHEST PAIN, Disp: 100 tablet, Rfl: 11  •  pantoprazole (PROTONIX) 40 MG EC tablet, TAKE 1 TABLET BY MOUTH EVERY DAY, Disp: 90 tablet, Rfl: 3  •  pyridoxine (B-6) 200 MG tablet, Take 1 tablet by mouth daily., Disp: , Rfl:   •  rOPINIRole (REQUIP) 0.5 MG tablet, 2 (TWO) TABLET DAILY AS NEEDED, Disp: , Rfl:   •  saccharomyces boulardii (FLORASTOR) 250 MG capsule, Take 1 capsule by mouth 2 (Two) Times a Day., Disp: , Rfl:   •  spironolactone (ALDACTONE) 25 MG tablet, Take 1 tablet by mouth Daily., Disp: 90 tablet, Rfl: 2  •  temazepam (RESTORIL) 7.5 MG capsule, 15 mg., Disp: , Rfl: 1  •  Umeclidinium-Vilanterol 62.5-25 MCG/INH aerosol powder , Inhale 1 puff Daily., Disp: , Rfl:   •  VENTOLIN  (90 BASE) MCG/ACT inhaler, INHALE 2 PUFFS BY MOUTH EVERY 4 HOURS AS NEEDED, Disp: , Rfl: 4  •  warfarin (COUMADIN) 2.5 MG tablet, Atrial fibrillation  Indications: Atrial Fibrillation (Patient taking differently: Every Night. Atrial fibrillation  Indications: Atrial Fibrillation), Disp: 30 tablet, Rfl: 3  •  warfarin  "(COUMADIN) 5 MG tablet, Atrial fibrillation  Indications: Atrial Fibrillation (Patient taking differently: 5 mg 1 (One) Time Per Week. Atrial fibrillation  Indications: Atrial Fibrillation), Disp: 30 tablet, Rfl: 3    Current Facility-Administered Medications:   •  sodium chloride 0.9 % flush 10 mL, 10 mL, Intravenous, PRN, Quita Mahoney APRN      Objective:     Vitals:    01/25/23 1036   BP: 140/84   BP Location: Right arm   Patient Position: Sitting   Pulse: 82   SpO2: 100%   Weight: 60.9 kg (134 lb 3.2 oz)   Height: 154.9 cm (61\")     Body mass index is 25.36 kg/m².    PHYSICAL EXAM:    Neck:      Vascular: No JVD.   Pulmonary:      Effort: Pulmonary effort is normal.      Breath sounds: Normal breath sounds.   Cardiovascular:      Normal rate. Regular rhythm.      Murmurs: There is a systolic murmur.      No gallop. No click. No rub.   Pulses:     Intact distal pulses.           ECG 12 Lead    Date/Time: 1/25/2023 10:44 AM  Performed by: Tammie Morgan APRN  Authorized by: Tammie oMrgan APRN   Comparison: compared with previous ECG from 8/17/2022  Similar to previous ECG  Rhythm: paced  Rate: normal  BPM: 80                Assessment:       Diagnosis Plan   1. Coronary artery disease involving native coronary artery of native heart, unspecified whether angina present  ECG 12 Lead      2. Dyspnea on exertion  CBC (No Diff)    Comprehensive Metabolic Panel    proBNP    TSH      3. Ischemic cardiomyopathy        4. Mitral valve insufficiency, unspecified etiology        5. Permanent atrial fibrillation (HCC)        6. Third degree AV block (HCC)        7. Cardiac pacemaker in situ          Orders Placed This Encounter   Procedures   • CBC (No Diff)     Standing Status:   Future     Number of Occurrences:   1     Standing Expiration Date:   1/25/2024     Order Specific Question:   Release to patient     Answer:   Routine Release   • Comprehensive Metabolic Panel     Standing Status:   Future     " Number of Occurrences:   1     Standing Expiration Date:   1/25/2024     Order Specific Question:   Release to patient     Answer:   Routine Release   • proBNP     Standing Status:   Future     Number of Occurrences:   1     Standing Expiration Date:   1/25/2024     Order Specific Question:   Release to patient     Answer:   Routine Release   • TSH     Standing Status:   Future     Number of Occurrences:   1     Standing Expiration Date:   1/25/2024     Order Specific Question:   Release to patient     Answer:   Routine Release   • ECG 12 Lead     This order was created via procedure documentation     Order Specific Question:   Release to patient     Answer:   Routine Release          Plan:       1.  Coronary artery disease.  History of inferior anterolateral STEMI and PCI of the mid RCA in March 2020 followed by high risk PCI of the left main several months later.  She has small vessel disease which we have been managing medically.  Unfortunately, she has been intolerant of several medications including beta-blockers, long-acting nitrates, and ranolazine.       2.  Ischemic cardiomyopathy/dyspnea.  Her last echocardiogram in March 2022 demonstrated normal LV systolic function.  I do not think her dyspnea is related to heart failure as she appears euvolemic on exam.  However, I will check labs today to rule out underlying pathologies.      3.  Mitral insufficiency.  Echocardiogram from March 2022 demonstrated mild to moderate MR.      4.  Permanent atrial fibrillation.  She is rate controlled and anticoagulated.      5.  Third-degree heart block status post permanent pacemaker.  Device check from 1/7 demonstrated normal device function with one 13 beat run of NSVT.      Overall, I think she is stable.  I will discuss the plan of care with Dr. Root and call her when her labs result.      As always, it has been a pleasure to participate in your patient's care.      Sincerely,         RONEL Wallis

## 2023-02-01 ENCOUNTER — TELEPHONE (OUTPATIENT)
Dept: CARDIOLOGY | Facility: CLINIC | Age: 88
End: 2023-02-01
Payer: MEDICARE

## 2023-02-01 DIAGNOSIS — R06.09 DYSPNEA ON EXERTION: ICD-10-CM

## 2023-02-01 DIAGNOSIS — I25.10 CORONARY ARTERY DISEASE INVOLVING NATIVE CORONARY ARTERY OF NATIVE HEART, UNSPECIFIED WHETHER ANGINA PRESENT: Primary | ICD-10-CM

## 2023-02-06 PROCEDURE — 93294 REM INTERROG EVL PM/LDLS PM: CPT | Performed by: INTERNAL MEDICINE

## 2023-02-06 PROCEDURE — 93296 REM INTERROG EVL PM/IDS: CPT | Performed by: INTERNAL MEDICINE

## 2023-02-20 ENCOUNTER — HOSPITAL ENCOUNTER (OUTPATIENT)
Dept: CARDIOLOGY | Facility: HOSPITAL | Age: 88
Discharge: HOME | End: 2023-02-20
Admitting: NURSE PRACTITIONER
Payer: MEDICARE

## 2023-02-20 VITALS — HEIGHT: 61 IN | BODY MASS INDEX: 25.35 KG/M2 | WEIGHT: 134.26 LBS

## 2023-02-20 DIAGNOSIS — R06.09 DYSPNEA ON EXERTION: ICD-10-CM

## 2023-02-20 DIAGNOSIS — I25.10 CORONARY ARTERY DISEASE INVOLVING NATIVE CORONARY ARTERY OF NATIVE HEART, UNSPECIFIED WHETHER ANGINA PRESENT: ICD-10-CM

## 2023-02-20 LAB
BH CV NUCLEAR PRIOR STUDY: 2
BH CV REST NUCLEAR ISOTOPE DOSE: 31 MCI
BH CV STRESS BP STAGE 1: NORMAL
BH CV STRESS COMMENTS STAGE 1: NORMAL
BH CV STRESS DOSE REGADENOSON STAGE 1: 0.4
BH CV STRESS DURATION MIN STAGE 1: 0
BH CV STRESS DURATION SEC STAGE 1: 10
BH CV STRESS HR STAGE 1: 86
BH CV STRESS NUCLEAR ISOTOPE DOSE: 31 MCI
BH CV STRESS PROTOCOL 1: NORMAL
BH CV STRESS RECOVERY BP: NORMAL MMHG
BH CV STRESS RECOVERY HR: 80 BPM
BH CV STRESS STAGE 1: 1
LV EF NUC BP: 61 %
MAXIMAL PREDICTED HEART RATE: 131 BPM
PERCENT MAX PREDICTED HR: 65.65 %
STRESS BASELINE BP: NORMAL MMHG
STRESS BASELINE HR: 81 BPM
STRESS PERCENT HR: 77 %
STRESS POST EXERCISE DUR SEC: 10 SEC
STRESS POST PEAK BP: NORMAL MMHG
STRESS POST PEAK HR: 86 BPM
STRESS TARGET HR: 111 BPM

## 2023-02-20 PROCEDURE — 93016 CV STRESS TEST SUPVJ ONLY: CPT | Performed by: INTERNAL MEDICINE

## 2023-02-20 PROCEDURE — A9555 RB82 RUBIDIUM: HCPCS | Performed by: NURSE PRACTITIONER

## 2023-02-20 PROCEDURE — 78492 MYOCRD IMG PET MLT RST&STRS: CPT | Performed by: INTERNAL MEDICINE

## 2023-02-20 PROCEDURE — 78492 MYOCRD IMG PET MLT RST&STRS: CPT

## 2023-02-20 PROCEDURE — 93017 CV STRESS TEST TRACING ONLY: CPT

## 2023-02-20 PROCEDURE — 93018 CV STRESS TEST I&R ONLY: CPT | Performed by: INTERNAL MEDICINE

## 2023-02-20 PROCEDURE — 0 RUBIDIUM CHLORIDE: Performed by: NURSE PRACTITIONER

## 2023-02-20 PROCEDURE — 25010000002 REGADENOSON 0.4 MG/5ML SOLUTION: Performed by: NURSE PRACTITIONER

## 2023-02-20 RX ADMIN — REGADENOSON 0.4 MG: 0.08 INJECTION, SOLUTION INTRAVENOUS at 08:30

## 2023-02-20 NOTE — PROGRESS NOTES
Reviewed results and recommendations with Vicky Stewart.  Patient verbalized understanding of results and recommendations.    Thank you,  Aleyda EVERETT RN  Triage Nurse [facility]

## 2023-02-20 NOTE — PROGRESS NOTES
Please let her know her stress test was normal.  She will follow-up with Dr. Root in March as scheduled.

## 2023-03-08 ENCOUNTER — OFFICE VISIT (OUTPATIENT)
Dept: CARDIOLOGY | Facility: CLINIC | Age: 88
End: 2023-03-08
Payer: MEDICARE

## 2023-03-08 ENCOUNTER — CLINICAL SUPPORT NO REQUIREMENTS (OUTPATIENT)
Dept: CARDIOLOGY | Facility: CLINIC | Age: 88
End: 2023-03-08
Payer: MEDICARE

## 2023-03-08 VITALS
HEIGHT: 60 IN | SYSTOLIC BLOOD PRESSURE: 166 MMHG | BODY MASS INDEX: 25.8 KG/M2 | DIASTOLIC BLOOD PRESSURE: 80 MMHG | WEIGHT: 131.4 LBS | HEART RATE: 81 BPM

## 2023-03-08 DIAGNOSIS — Z95.0 CARDIAC PACEMAKER IN SITU: Primary | ICD-10-CM

## 2023-03-08 DIAGNOSIS — I42.0 CARDIOMYOPATHY, DILATED: ICD-10-CM

## 2023-03-08 DIAGNOSIS — I44.2 THIRD DEGREE AV BLOCK: Primary | ICD-10-CM

## 2023-03-08 DIAGNOSIS — I48.21 PERMANENT ATRIAL FIBRILLATION: ICD-10-CM

## 2023-03-08 DIAGNOSIS — I25.10 CORONARY ARTERY DISEASE INVOLVING NATIVE CORONARY ARTERY OF NATIVE HEART WITHOUT ANGINA PECTORIS: ICD-10-CM

## 2023-03-08 PROCEDURE — 99214 OFFICE O/P EST MOD 30 MIN: CPT | Performed by: INTERNAL MEDICINE

## 2023-03-08 PROCEDURE — 93279 PRGRMG DEV EVAL PM/LDLS PM: CPT | Performed by: INTERNAL MEDICINE

## 2023-03-08 RX ORDER — AMLODIPINE BESYLATE 2.5 MG/1
2.5 TABLET ORAL DAILY
Qty: 90 TABLET | Refills: 3 | Status: SHIPPED | OUTPATIENT
Start: 2023-03-08

## 2023-03-08 NOTE — PROGRESS NOTES
Date of Office Visit: 23  Encounter Provider: Melquiades Root MD  Place of Service: [facility] CARDIOLOGY  Patient Name: Vicky Stewart  :10/18/1933  [id number]    Chief Complaint   Patient presents with   • Coronary Artery Disease   :     HPI: Vicky Stewart is a 89 y.o. female  who had inferolateral myocardial infarction in 2020.  At that time we stopped her STEMI and placed a 3.5 x 15 mm length and 3.5x 28 Xience eluding stent mid RCA.  She also has significant distal left main lesion some proximal LAD disease.  She is not really a candidate for surgical intervention.  We have been managing her medically.    In 2020 we ended up bringing her back to the Cath Lab for what we thought was recurrent angina and we did a rotational atherectomy and crushing drug-eluting stenting in the left main into the LAD and circumflex.  She is also had a history of a permanent pacemaker and atrial fibrillation.  Echocardiography in 2020 showed an ejection fraction of 40 to 45% with aortic sclerosis moderate to severe tricuspid insufficiency.  Normal pulmonary pressures.  She has some other small vessel coronary disease that we have managed medically.  She has COPD and its been difficult in the past trying to figure out if some of her shortness of breath is from that or from her heart.    She continues to have some shortness of breath she goes to see her lung doctors and they tell her that it is not from her lungs and that is from her heart or A. fib.  INR really think it is from that her A. fib is well controlled.  She could have small vessel disease but we tried her on nitrates long-acting that made her feel worse.    She is here for follow-up and she has had some symptoms that seem consistent with angina she is has dyspnea on exertion she has had some discomfort up into her jaw no Avery year ago probably need no PND no orthopnea she has Raynaud's and beta-blockers made  that worse she had a headache with long-acting nitrates she was intolerant of Ranexa.  Her blood pressure is a little bit high today.  She has had what initially was thought to be RVR but now there is a question of whether there could be nonsustained VT on her pacemaker    Past Medical History:   Diagnosis Date   • Acute MI, inferior wall (HCC) 03/2020   • Anemia    • Atrial fibrillation (HCC)    • Atrial flutter (HCC)    • AV block, 3rd degree ([facility])    • Carotid artery stenosis    • Chronic systolic heart failure (HCC)    • Dermatitis    • GERD (gastroesophageal reflux disease)    • History of EKG 06/17/2016   • Hyperlipidemia    • Hypertension    • Ischemic cardiomyopathy    • Low back pain    • Osteoarthritis     knee   • Osteoporosis    • Pacemaker    • Palpitations    • Persistent atrial fibrillation (HCC)     on warfarin   • Raynaud's phenomenon    • Skin cancer 09/14/2022   • SOB (shortness of breath) on exertion    • SSS (sick sinus syndrome) ([facility])    • URI, acute    • Venous insufficiency        Past Surgical History:   Procedure Laterality Date   • BILE DUCT STENT PLACEMENT      choledochal   • BREAST BIOPSY Left     benign   • CARDIAC CATHETERIZATION N/A 3/14/2020    Procedure: Coronary angiography;  Surgeon: Melquiades Root MD;  Location: [facility] CATH INVASIVE LOCATION;  Service: Cardiovascular;  Laterality: N/A;   • CARDIAC CATHETERIZATION  3/14/2020    Procedure: Percutaneous Manual Thrombectomy;  Surgeon: Melquiades Root MD;  Location: [facility] CATH INVASIVE LOCATION;  Service: Cardiovascular;;   • CARDIAC CATHETERIZATION N/A 3/14/2020    Procedure: Stent CAROLYNN coronary;  Surgeon: Melquiades Root MD;  Location: [facility] CATH INVASIVE LOCATION;  Service: Cardiovascular;  Laterality: N/A;   • CARDIAC CATHETERIZATION Right 7/9/2020    Procedure: Percutaneous Coronary Intervention;  Surgeon: Melquiades Root MD;  Location: [facility] CATH INVASIVE LOCATION;  Service: Cardiovascular;  Laterality: Right;   unprotected L main PCI.  8 Fr sheath in R CFA    • CARDIAC CATHETERIZATION N/A 2020    Procedure: Stent CAROLYNN coronary;  Surgeon: Melquiades Root MD;  Location:  [facility] CATH INVASIVE LOCATION;  Service: Cardiovascular;  Laterality: N/A;   • CARDIAC CATHETERIZATION N/A 2020    Procedure: Atherectomy-coronary;  Surgeon: Melquiades Root MD;  Location:  [facility] CATH INVASIVE LOCATION;  Service: Cardiovascular;  Laterality: N/A;   • CARDIAC ELECTROPHYSIOLOGY PROCEDURE N/A 2016    Procedure: Lead Revision PPM BOSTON;  Surgeon: Kale Méndez MD;  Location:  [facility] CATH INVASIVE LOCATION;  Service:    • CATARACT EXTRACTION W/ INTRAOCULAR LENS  IMPLANT, BILATERAL Bilateral    • CHOLECYSTECTOMY     • COLONOSCOPY  2012   • FACIAL COSMETIC SURGERY     • OTHER SURGICAL HISTORY      interrogation of implantable loop recorder remote, up to 30 days   • PACEMAKER IMPLANTATION     • SINUS SURGERY         Social History     Socioeconomic History   • Marital status:    Tobacco Use   • Smoking status: Former     Packs/day: 1.50     Types: Cigarettes     Start date:      Quit date:      Years since quittin.1   • Smokeless tobacco: Never   • Tobacco comments:     CAFFEINE USE: 3 CUPS DECAF COFFEE DAILY   Substance and Sexual Activity   • Alcohol use: No   • Drug use: No   • Sexual activity: Defer     Comment:        Family History   Problem Relation Age of Onset   • Heart disease Mother    • Stroke Mother    • Heart disease Father    • Diabetes Sister    • Hodgkin's lymphoma Sister    • Heart disease Sister         pacemaker   • Heart disease Other    • Stroke Other    • Endometrial cancer Sister        Review of Systems   Constitutional: Negative for decreased appetite, fever, malaise/fatigue and weight loss.   HENT: Negative for nosebleeds.    Eyes: Negative for double vision.   Cardiovascular: Negative for chest pain, claudication, cyanosis, dyspnea on exertion, irregular heartbeat, leg swelling,  near-syncope, orthopnea, palpitations, paroxysmal nocturnal dyspnea and syncope.   Respiratory: Negative for cough, hemoptysis and shortness of breath.    Hematologic/Lymphatic: Negative for bleeding problem.   Skin: Negative for rash.   Musculoskeletal: Negative for falls and myalgias.   Gastrointestinal: Negative for hematochezia, jaundice, melena, nausea and vomiting.   Genitourinary: Negative for hematuria.   Neurological: Negative for dizziness and seizures.   Psychiatric/Behavioral: Negative for altered mental status and memory loss.       Allergies   Allergen Reactions   • Carvedilol Other (See Comments)     MAKES HER VERY COLD   • Codeine Nausea And Vomiting   • Lisinopril Dizziness   • Penicillins Swelling   • Ranexa [Ranolazine Er] Headache   • Sulfa Antibiotics Nausea And Vomiting   • Tramadol Nausea And Vomiting         Current Outpatient Medications:   •  acetaminophen (TYLENOL) 325 MG tablet, Take 1 tablet by mouth 2 (Two) Times a Day As Needed for Mild Pain., Disp: , Rfl:   •  atorvastatin (LIPITOR) 40 MG tablet, TAKE 1 TABLET BY MOUTH EVERY DAY, Disp: 90 tablet, Rfl: 0  •  carbidopa-levodopa (SINEMET)  MG per tablet, Take 1 tablet by mouth Every Night., Disp: , Rfl: 11  •  Cholecalciferol (VITAMIN D3) 5000 UNITS capsule capsule, Take 1 capsule by mouth As Needed., Disp: , Rfl:   •  ciclopirox (LOPROX) 0.77 % suspension, 1 APPLICATION ON THE SKIN DAILY APPLY TO THE AFFECTED FINGERNAILS ONCE DAILY FOR 3-4 MONTHS, Disp: , Rfl:   •  clopidogrel (PLAVIX) 75 MG tablet, TAKE 1 TABLET BY MOUTH EVERY DAY, Disp: 90 tablet, Rfl: 3  •  fluorouracil (EFUDEX) 5 % cream, , Disp: , Rfl:   •  fluticasone (FLONASE) 50 MCG/ACT nasal spray, 2 sprays into the nostril(s) as directed by provider Daily., Disp: , Rfl:   •  furosemide (LASIX) 40 MG tablet, TAKE 1 TABLET BY MOUTH EVERY DAY, Disp: 90 tablet, Rfl: 3  •  loratadine (CLARITIN) 10 MG tablet, Take 1 tablet by mouth Daily., Disp: , Rfl:   •  nitroglycerin  "(NITROSTAT) 0.4 MG SL tablet, PLACE 1 TABLET UNDER TONGUE EVERY 5 MINS, UP TO 3 DOSES AS NEEDED FOR CHEST PAIN, Disp: 100 tablet, Rfl: 11  •  pantoprazole (PROTONIX) 40 MG EC tablet, TAKE 1 TABLET BY MOUTH EVERY DAY, Disp: 90 tablet, Rfl: 3  •  pyridoxine (B-6) 200 MG tablet, Take 1 tablet by mouth Daily., Disp: , Rfl:   •  saccharomyces boulardii (FLORASTOR) 250 MG capsule, Take 1 capsule by mouth 2 (Two) Times a Day., Disp: , Rfl:   •  spironolactone (ALDACTONE) 25 MG tablet, Take 1 tablet by mouth Daily., Disp: 90 tablet, Rfl: 2  •  temazepam (RESTORIL) 7.5 MG capsule, 1 capsule., Disp: , Rfl: 1  •  Umeclidinium-Vilanterol 62.5-25 MCG/INH aerosol powder , Inhale 1 puff Daily., Disp: , Rfl:   •  VENTOLIN  (90 BASE) MCG/ACT inhaler, INHALE 2 PUFFS BY MOUTH EVERY 4 HOURS AS NEEDED, Disp: , Rfl: 4  •  warfarin (COUMADIN) 2.5 MG tablet, Atrial fibrillation  Indications: Atrial Fibrillation (Patient taking differently: Every Night. Atrial fibrillation  Indications: Atrial Fibrillation), Disp: 30 tablet, Rfl: 3  •  warfarin (COUMADIN) 5 MG tablet, Atrial fibrillation  Indications: Atrial Fibrillation (Patient taking differently: 1 tablet 1 (One) Time Per Week. Atrial fibrillation  Indications: Atrial Fibrillation), Disp: 30 tablet, Rfl: 3  •  rOPINIRole (REQUIP) 0.5 MG tablet, 2 (TWO) TABLET DAILY AS NEEDED, Disp: , Rfl:     Current Facility-Administered Medications:   •  sodium chloride 0.9 % flush 10 mL, 10 mL, Intravenous, PRN, Quita Mahoney R, APRN      Objective:     Vitals:    03/08/23 1311   BP: 166/80   Pulse: 81   Weight: 59.6 kg (131 lb 6.4 oz)   Height: 152.4 cm (60\")     Body mass index is 25.66 kg/m².    Constitutional:       Appearance: Well-developed.   Eyes:      General: No scleral icterus.  HENT:      Head: Normocephalic.   Neck:      Thyroid: No thyromegaly.      Vascular: No JVD.      Lymphadenopathy: No cervical adenopathy.   Pulmonary:      Effort: Pulmonary effort is normal.      Breath " sounds: Normal breath sounds. No wheezing. No rales.   Chest:       Cardiovascular:      Normal rate. Regular rhythm.      No gallop.   Edema:     Peripheral edema absent.   Abdominal:      Palpations: Abdomen is soft.      Tenderness: There is no abdominal tenderness.   Musculoskeletal: Normal range of motion. Skin:     General: Skin is warm and dry.      Findings: No rash.   Neurological:      Mental Status: Alert and oriented to person, place, and time.         Procedures     Assessment:       Diagnosis Plan   1. Cardiac pacemaker in situ        2. Coronary artery disease involving native coronary artery of native heart without angina pectoris        3. Cardiomyopathy, dilated (HCC)        4. Permanent atrial fibrillation (HCC)               Plan:       Tough situation.  I think with her age and frailty we will try to manage her medically but so many of her medicines she has not been able to tolerate I would like to try her on just 2-1/2 of amlodipine which does have an antianginal properties and may bring her blood pressure down a little bit and see how she does with that and see if things get better if they do not we could do a cath with an idea toward a rescue PCI but can like to avoid aggressive things on her if possible.  We will have her come see Carlita in 6 weeks and see me in 3 months    As always, it has been a pleasure to participate in your patient's care.      Sincerely,       Melquiades Root MD

## 2023-03-09 ENCOUNTER — TELEPHONE (OUTPATIENT)
Dept: CARDIOLOGY | Facility: CLINIC | Age: 88
End: 2023-03-09

## 2023-03-09 RX ORDER — FUROSEMIDE 40 MG/1
TABLET ORAL
Qty: 90 TABLET | Refills: 3 | Status: SHIPPED | OUTPATIENT
Start: 2023-03-09

## 2023-03-09 NOTE — TELEPHONE ENCOUNTER
Caller: Vicky Stewart    Relationship to patient: Self    Best call back number: [phone]    Patient is needing: PER LAST OFFICE NOTE DR STALLINGS WANTED PT TO SEE BRII IN 6 WEEKS AND HIM IN 3 MONTHS. PT IS SCHEDULED WITH BRII BUT UNABLE TO FIND 3 MONTH FOLLOW UP WITH CHANDRAKANT.

## 2023-03-10 ENCOUNTER — TELEPHONE (OUTPATIENT)
Dept: CARDIOLOGY | Facility: CLINIC | Age: 88
End: 2023-03-10
Payer: MEDICARE

## 2023-03-10 NOTE — TELEPHONE ENCOUNTER
Spoke to patient. Her vitals are appropriate, /75 and pulse 80.    She says she understands why we prescribed medication but she will not take it again.  I offered Imdur as an alternative but she does not want to start anything else.    I educated her on notification parameters.

## 2023-03-10 NOTE — TELEPHONE ENCOUNTER
Patient was in to see Dr. Root on 3/8/23. Dr. Root started her on Amlodipine 2.5mg once daily. She states she is not going to take anymore of it.   She states she is not feeling well from this. States she is breathless, tired and lightheaded and is going  Back to bed.   Dr. Root does have her coming back to see you in 6 weeks.

## 2023-04-19 ENCOUNTER — OFFICE VISIT (OUTPATIENT)
Dept: CARDIOLOGY | Facility: CLINIC | Age: 88
End: 2023-04-19
Payer: MEDICARE

## 2023-04-19 VITALS
SYSTOLIC BLOOD PRESSURE: 150 MMHG | DIASTOLIC BLOOD PRESSURE: 90 MMHG | HEIGHT: 55 IN | HEART RATE: 80 BPM | WEIGHT: 130 LBS | OXYGEN SATURATION: 98 % | BODY MASS INDEX: 30.09 KG/M2

## 2023-04-19 DIAGNOSIS — I25.5 ISCHEMIC CARDIOMYOPATHY: ICD-10-CM

## 2023-04-19 DIAGNOSIS — R06.09 DYSPNEA ON EXERTION: ICD-10-CM

## 2023-04-19 DIAGNOSIS — I48.21 PERMANENT ATRIAL FIBRILLATION: ICD-10-CM

## 2023-04-19 DIAGNOSIS — I44.2 THIRD DEGREE AV BLOCK: ICD-10-CM

## 2023-04-19 DIAGNOSIS — I10 PRIMARY HYPERTENSION: ICD-10-CM

## 2023-04-19 DIAGNOSIS — Z95.820 S/P ANGIOPLASTY WITH STENT: ICD-10-CM

## 2023-04-19 DIAGNOSIS — I25.10 CORONARY ARTERY DISEASE INVOLVING NATIVE CORONARY ARTERY OF NATIVE HEART WITHOUT ANGINA PECTORIS: Primary | ICD-10-CM

## 2023-04-19 NOTE — PROGRESS NOTES
[facility] Follow Up Office Note     Encounter Date:23  Patient:Vicky Stewart  :10/18/1933  MRN:[id number]      Chief Complaint:   Chief Complaint   Patient presents with   • Follow-up         History of Presenting Illness:        Vicky Stewart is a 89 y.o. female who is here for follow-up.  She is a patient of Dr Root.    Patient has past medical history that is significant for coronary artery disease, paroxysmal atrial fibrillation, permanent pacemaker, ischemic cardiomyopathy, chronic systolic heart failure, COPD.    In 2020 patient presented with inferior-anterior lateral STEMI and is status post CAROLYNN of mid RCA at that time.  She had residual distal left main and proximal LAD disease.  She was not felt to be candidate for surgical intervention and this was medically managed.  She had a repeat heart catheterization in 2020 due to recurrent angina and had a complex intervention to the left main into LAD and circumflex.  She has had persistent dyspnea on exertion and anginal symptoms which we are trying to medically managed with her frailty and age.  She has seen pulmonary, Dr. Orr, who does not feel this is a lung issue.    Patient was seen by Dr. Root on 2023.  At this time she was reporting ongoing angina and dyspnea on exertion.  Additionally, there was some question as to whether she was having A-fib with RVR or NSVT on her pacemaker interrogation.  Her antianginal therapy is limited as she is intolerant of beta-blockers due to worsening of Raynaud's, she has headache with long-acting nitrate and is also intolerant of Ranexa.  She was started on low-dose amlodipine as antianginal at this appointment however she later called the office and said she did not feel well on this medication and it made her tired and she was stopping it.    Her largest complaint today is not sleeping well as well as shoulder pain related to arthritis.  Since she saw Dr. Root she  reports she has had infrequent angina and has not used SL nitro recently.  She also says her shortness of breath comes in spurts and seems to be doing well right now.  She is not having orthopnea.  She has some chronic mild lower extremity edema that goes down at night.  She had recent vein injections.  She felt absolutely terrible and was dizzy on amlodipine which she reports she also had in the past and will not be taking this medication again.  She is concerned that she is very sensitive to medications.    Review of Systems:  Review of Systems   Constitutional: Positive for malaise/fatigue.   Cardiovascular: Positive for dyspnea on exertion. Negative for chest pain, leg swelling, orthopnea and palpitations.   Respiratory: Negative for shortness of breath.    Musculoskeletal: Positive for arthritis and joint pain.       Current Outpatient Medications on File Prior to Visit   Medication Sig Dispense Refill   • acetaminophen (TYLENOL) 325 MG tablet Take 1 tablet by mouth 2 (Two) Times a Day As Needed for Mild Pain.     • atorvastatin (LIPITOR) 40 MG tablet TAKE 1 TABLET BY MOUTH EVERY DAY 90 tablet 0   • carbidopa-levodopa (SINEMET)  MG per tablet Take 1 tablet by mouth Every Night.  11   • Cholecalciferol (VITAMIN D3) 5000 UNITS capsule capsule Take 1 capsule by mouth As Needed.     • ciclopirox (LOPROX) 0.77 % suspension 1 APPLICATION ON THE SKIN DAILY APPLY TO THE AFFECTED FINGERNAILS ONCE DAILY FOR 3-4 MONTHS     • clopidogrel (PLAVIX) 75 MG tablet TAKE 1 TABLET BY MOUTH EVERY DAY 90 tablet 3   • fluorouracil (EFUDEX) 5 % cream      • fluticasone (FLONASE) 50 MCG/ACT nasal spray 2 sprays into the nostril(s) as directed by provider Daily.     • furosemide (LASIX) 40 MG tablet TAKE 1 TABLET BY MOUTH EVERY DAY 90 tablet 3   • loratadine (CLARITIN) 10 MG tablet Take 1 tablet by mouth Daily.     • nitroglycerin (NITROSTAT) 0.4 MG SL tablet PLACE 1 TABLET UNDER TONGUE EVERY 5 MINS, UP TO 3 DOSES AS NEEDED FOR  CHEST PAIN 100 tablet 11   • pantoprazole (PROTONIX) 40 MG EC tablet TAKE 1 TABLET BY MOUTH EVERY DAY 90 tablet 3   • pyridoxine (B-6) 200 MG tablet Take 1 tablet by mouth Daily.     • rOPINIRole (REQUIP) 0.5 MG tablet 2 (TWO) TABLET DAILY AS NEEDED     • saccharomyces boulardii (FLORASTOR) 250 MG capsule Take 1 capsule by mouth 2 (Two) Times a Day.     • spironolactone (ALDACTONE) 25 MG tablet Take 1 tablet by mouth Daily. 90 tablet 2   • temazepam (RESTORIL) 7.5 MG capsule 1 capsule.  1   • Umeclidinium-Vilanterol 62.5-25 MCG/INH aerosol powder  Inhale 1 puff Daily.     • VENTOLIN  (90 BASE) MCG/ACT inhaler INHALE 2 PUFFS BY MOUTH EVERY 4 HOURS AS NEEDED  4   • warfarin (COUMADIN) 2.5 MG tablet Atrial fibrillation  Indications: Atrial Fibrillation (Patient taking differently: Every Night. Atrial fibrillation  Indications: Atrial Fibrillation) 30 tablet 3   • warfarin (COUMADIN) 5 MG tablet Atrial fibrillation  Indications: Atrial Fibrillation (Patient taking differently: 1 tablet 1 (One) Time Per Week. Atrial fibrillation  Indications: Atrial Fibrillation) 30 tablet 3   • [DISCONTINUED] amLODIPine (NORVASC) 2.5 MG tablet Take 1 tablet by mouth Daily. 90 tablet 3     Current Facility-Administered Medications on File Prior to Visit   Medication Dose Route Frequency Provider Last Rate Last Admin   • sodium chloride 0.9 % flush 10 mL  10 mL Intravenous PRN Quita Mahoney APRN           Allergies   Allergen Reactions   • Carvedilol Other (See Comments)     MAKES HER VERY COLD   • Codeine Nausea And Vomiting   • Lisinopril Dizziness   • Penicillins Swelling   • Ranexa [Ranolazine Er] Headache   • Sulfa Antibiotics Nausea And Vomiting   • Tramadol Nausea And Vomiting       Past Medical History:   Diagnosis Date   • Acute MI, inferior wall 03/2020   • Anemia    • Atrial fibrillation    • Atrial flutter    • AV block, 3rd degree    • Carotid artery stenosis    • Chronic systolic heart failure    • Dermatitis    •  GERD (gastroesophageal reflux disease)    • History of EKG 06/17/2016   • Hyperlipidemia    • Hypertension    • Ischemic cardiomyopathy    • Low back pain    • Osteoarthritis     knee   • Osteoporosis    • Pacemaker    • Palpitations    • Persistent atrial fibrillation     on warfarin   • Raynaud's phenomenon    • Skin cancer 09/14/2022   • SOB (shortness of breath) on exertion    • SSS (sick sinus syndrome)    • URI, acute    • Venous insufficiency        Past Surgical History:   Procedure Laterality Date   • BILE DUCT STENT PLACEMENT      choledochal   • BREAST BIOPSY Left     benign   • CARDIAC CATHETERIZATION N/A 3/14/2020    Procedure: Coronary angiography;  Surgeon: Melquiades Root MD;  Location: [facility] CATH INVASIVE LOCATION;  Service: Cardiovascular;  Laterality: N/A;   • CARDIAC CATHETERIZATION  3/14/2020    Procedure: Percutaneous Manual Thrombectomy;  Surgeon: Melquiades Root MD;  Location: [facility] CATH INVASIVE LOCATION;  Service: Cardiovascular;;   • CARDIAC CATHETERIZATION N/A 3/14/2020    Procedure: Stent CAROLYNN coronary;  Surgeon: Melquiades Root MD;  Location: [facility] CATH INVASIVE LOCATION;  Service: Cardiovascular;  Laterality: N/A;   • CARDIAC CATHETERIZATION Right 7/9/2020    Procedure: Percutaneous Coronary Intervention;  Surgeon: Melquiades Root MD;  Location: [facility] CATH INVASIVE LOCATION;  Service: Cardiovascular;  Laterality: Right;  unprotected L main PCI.  8 Fr sheath in R CFA    • CARDIAC CATHETERIZATION N/A 7/9/2020    Procedure: Stent CAROLYNN coronary;  Surgeon: Melquiadse Root MD;  Location: [facility] INVASIVE LOCATION;  Service: Cardiovascular;  Laterality: N/A;   • CARDIAC CATHETERIZATION N/A 7/9/2020    Procedure: Atherectomy-coronary;  Surgeon: Melquiades Root MD;  Location: [facility] CATH INVASIVE LOCATION;  Service: Cardiovascular;  Laterality: N/A;   • CARDIAC ELECTROPHYSIOLOGY PROCEDURE N/A 9/14/2016    Procedure: Lead Revision PPM [place];  Surgeon: Kale Méndez MD;  Location:  "[facility] INVASIVE LOCATION;  Service:    • CATARACT EXTRACTION W/ INTRAOCULAR LENS  IMPLANT, BILATERAL Bilateral    • CHOLECYSTECTOMY     • COLONOSCOPY  2012   • FACIAL COSMETIC SURGERY     • OTHER SURGICAL HISTORY      interrogation of implantable loop recorder remote, up to 30 days   • PACEMAKER IMPLANTATION     • SINUS SURGERY         Social History     Socioeconomic History   • Marital status:    Tobacco Use   • Smoking status: Former     Packs/day: 1.50     Types: Cigarettes     Start date:      Quit date:      Years since quittin.3   • Smokeless tobacco: Never   • Tobacco comments:     CAFFEINE USE: 3 CUPS COFFEE DAILY   Substance and Sexual Activity   • Alcohol use: No   • Drug use: No   • Sexual activity: Defer     Comment:        Family History   Problem Relation Age of Onset   • Heart disease Mother    • Stroke Mother    • Heart disease Father    • Diabetes Sister    • Hodgkin's lymphoma Sister    • Heart disease Sister         pacemaker   • Heart disease Other    • Stroke Other    • Endometrial cancer Sister        The following portions of the patient's history were reviewed and updated as appropriate: allergies, current medications, past family history, past medical history, past social history, past surgical history and problem list.       Objective:       Vitals:    23 1142   BP: 150/90   BP Location: Right arm   Patient Position: Sitting   Cuff Size: Adult   Pulse: 80   SpO2: 98%   Weight: 59 kg (130 lb)   Height: 60 cm (23.62\")         Physical Exam:  Constitutional: Well appearing, well developed, no acute distress   HENT: Oropharynx clear and membrane moist  Eyes: Normal conjunctiva, no sclera icterus  Neck: Supple, no carotid bruit bilaterally  Cardiovascular: Regular rate and rhythm, Early peaking systolic murmur over the right upper sternal border, Trace bilateral lower extremity edema  Pulmonary: Normal respiratory effort, normal lung sounds, no " wheezing  Neurological: Alert and orient x 3  Skin: Warm, dry, no ecchymosis, no rash  Psych: Appropriate mood and affect. Normal judgment and insight         Lab Results   Component Value Date     01/25/2023     06/07/2022    K 4.6 01/25/2023    K 4.0 06/07/2022     01/25/2023     06/07/2022    CO2 28.0 01/25/2023    CO2 24.0 06/07/2022    BUN 21 01/25/2023    BUN 20 06/07/2022    CREATININE 0.89 01/25/2023    CREATININE 0.84 06/07/2022    EGFRIFNONA 56 (L) 09/17/2020    EGFRIFNONA 52 (L) 08/07/2020    EGFRIFAFRI 72 07/05/2017    EGFRIFAFRI  09/15/2016      Comment:      <15 Indicative of kidney failure.    GLUCOSE 90 01/25/2023    GLUCOSE 89 06/07/2022    CALCIUM 9.6 01/25/2023    CALCIUM 9.8 06/07/2022    PROTENTOTREF 6.4 07/05/2017    ALBUMIN 4.3 01/25/2023    ALBUMIN 4.50 06/07/2022    BILITOT 1.2 01/25/2023    BILITOT 0.7 06/07/2022    AST 19 01/25/2023    AST 24 06/07/2022    ALT 21 01/25/2023    ALT 24 06/07/2022     Lab Results   Component Value Date    WBC 9.06 01/25/2023    WBC 5.92 06/07/2022    HGB 11.5 (L) 01/25/2023    HGB 10.6 (L) 06/07/2022    HCT 36.0 01/25/2023    HCT 33.0 (L) 06/07/2022    MCV 89.8 01/25/2023    MCV 80.5 06/07/2022     01/25/2023     06/07/2022     Lab Results   Component Value Date    CHOL 141 07/10/2020    CHOL 121 03/15/2020    TRIG 62 07/10/2020    TRIG 67 03/15/2020    HDL 67 (H) 07/10/2020    HDL 63 (H) 03/15/2020    LDL 62 07/10/2020    LDL 45 03/15/2020     Lab Results   Component Value Date    PROBNP 1,244.0 01/25/2023    PROBNP 807.0 06/07/2022     Lab Results   Component Value Date    CKMB 37.90 (H) 03/16/2020    TROPONINT <0.010 08/07/2020     Lab Results   Component Value Date    TSH 1.480 01/25/2023    TSH 1.150 06/07/2022           ECG 12 Lead    Date/Time: 4/19/2023 11:59 AM  Performed by: Carlita Cordova APRN  Authorized by: Carlita Cordova APRN   Comparison: compared with previous ECG from 4/19/2023  Similar to  previous ECG  Rhythm: atrial fibrillation and paced  Rate: normal  BPM: 80                 Assessment:          Diagnosis Plan   1. Coronary artery disease involving native coronary artery of native heart without angina pectoris  ECG 12 Lead      2. Permanent atrial fibrillation        3. Third degree AV block        4. Primary hypertension        5. Dyspnea on exertion        6. S/P angioplasty with stent        7. Ischemic cardiomyopathy               Plan:       Coronary artery disease - history of PCI in setting of inferior-anterior lateral STEMI 3/2020 followed by high risk PCI of left main into LCx and LAD 3/2020.  She has been intolerant of antianginals including beta-blockers, long-acting nitrates, Ranexa, amlodipine.  She remains on clopidogrel and warfarin    Ischemic cardiomyopathy - most recent echocardiogram 3/2022 with preserved LVEF.  She appears compensated on exam    Mitral and tricuspid regurgitation - moderate on echo 3/2022.  She looks compensated on exam, would repeat echo in 1 to 2 years    Essential hypertension - blood pressure is a little high today.  She is concerned about starting new medications as she has been sensitive to them.  She did not tolerate starting amlodipine.  I asked her to keep a home blood pressure log to bring to her next appointment    Permanent atrial fibrillation - she is rate controlled and on anticoagulation.    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  Looks like she has also had a little NSVT.  She has a follow-up with Dr. Méndez in June    Patient is seen today for follow-up.  She has chronic angina and dyspnea but seems to actually be doing well recently.  I am not recommending any changes but I would like her to monitor her blood pressure.  Follow-up with Dr. Root as scheduled    Orders Placed This Encounter   Procedures   • ECG 12 Lead     This order was created via procedure documentation      Order Specific Question:   Release to patient     Answer:   Routine Release        RONEL Ayala  [facility]  04/19/23  12:15 EDT

## 2023-04-26 RX ORDER — PANTOPRAZOLE SODIUM 40 MG/1
TABLET, DELAYED RELEASE ORAL
Qty: 90 TABLET | Refills: 3 | Status: SHIPPED | OUTPATIENT
Start: 2023-04-26

## 2023-06-19 NOTE — TELEPHONE ENCOUNTER
Detail Level: Detailed I spoke with Dr. Root regarding her persistent shortness of breath.  Ultimately, he has recommended proceeding with a stress test for further evaluation.  I spoke with the patient.  She is agreeable.    Please call the patient to schedule a stress test.

## 2023-08-30 ENCOUNTER — TRANSCRIBE ORDERS (OUTPATIENT)
Dept: ADMINISTRATIVE | Facility: HOSPITAL | Age: 88
End: 2023-08-30
Payer: MEDICARE

## 2023-08-30 DIAGNOSIS — E83.10 DISORDER OF IRON METABOLISM: Primary | ICD-10-CM

## 2023-08-30 DIAGNOSIS — D64.9 ANEMIA, UNSPECIFIED TYPE: ICD-10-CM

## 2023-09-02 PROBLEM — E83.10 DISORDER OF IRON METABOLISM: Status: ACTIVE | Noted: 2023-09-02

## 2023-09-07 ENCOUNTER — HOSPITAL ENCOUNTER (OUTPATIENT)
Dept: INFUSION THERAPY | Facility: HOSPITAL | Age: 88
Discharge: HOME | End: 2023-09-07
Admitting: INTERNAL MEDICINE
Payer: MEDICARE

## 2023-09-07 VITALS
TEMPERATURE: 96.4 F | RESPIRATION RATE: 18 BRPM | SYSTOLIC BLOOD PRESSURE: 144 MMHG | OXYGEN SATURATION: 90 % | DIASTOLIC BLOOD PRESSURE: 97 MMHG | HEART RATE: 84 BPM

## 2023-09-07 DIAGNOSIS — D64.9 ANEMIA, UNSPECIFIED TYPE: Primary | ICD-10-CM

## 2023-09-07 DIAGNOSIS — E83.10 DISORDER OF IRON METABOLISM: ICD-10-CM

## 2023-09-07 PROCEDURE — 25010000002 IRON SUCROSE PER 1 MG: Performed by: INTERNAL MEDICINE

## 2023-09-07 PROCEDURE — 96374 THER/PROPH/DIAG INJ IV PUSH: CPT

## 2023-09-07 PROCEDURE — 96365 THER/PROPH/DIAG IV INF INIT: CPT

## 2023-09-07 RX ADMIN — IRON SUCROSE 200 MG: 20 INJECTION, SOLUTION INTRAVENOUS at 08:55

## 2023-09-07 NOTE — PROGRESS NOTES
Pt tolerated infusion well and monitored for 30 minutes post infusion. Pt given AVS and dc'ed per wc to main entrance.

## 2023-09-14 ENCOUNTER — HOSPITAL ENCOUNTER (OUTPATIENT)
Dept: INFUSION THERAPY | Facility: HOSPITAL | Age: 88
Discharge: HOME | End: 2023-09-14
Admitting: INTERNAL MEDICINE
Payer: MEDICARE

## 2023-09-14 VITALS
OXYGEN SATURATION: 91 % | SYSTOLIC BLOOD PRESSURE: 137 MMHG | TEMPERATURE: 96.8 F | HEART RATE: 82 BPM | RESPIRATION RATE: 16 BRPM | DIASTOLIC BLOOD PRESSURE: 73 MMHG

## 2023-09-14 DIAGNOSIS — D64.9 ANEMIA, UNSPECIFIED TYPE: Primary | ICD-10-CM

## 2023-09-14 DIAGNOSIS — E83.10 DISORDER OF IRON METABOLISM: ICD-10-CM

## 2023-09-14 PROCEDURE — 96365 THER/PROPH/DIAG IV INF INIT: CPT

## 2023-09-14 PROCEDURE — 25010000002 IRON SUCROSE PER 1 MG: Performed by: INTERNAL MEDICINE

## 2023-09-14 PROCEDURE — 96374 THER/PROPH/DIAG INJ IV PUSH: CPT

## 2023-09-14 RX ADMIN — IRON SUCROSE 200 MG: 20 INJECTION, SOLUTION INTRAVENOUS at 09:02

## 2023-12-21 ENCOUNTER — TELEPHONE (OUTPATIENT)
Age: 88
End: 2023-12-21
Payer: MEDICARE

## 2023-12-21 NOTE — TELEPHONE ENCOUNTER
PT has BSC L100 pacer that has hydrogen battery alert on it. Battery is saying <3 months and last month saying 5 months. Will you please place order for gen change?    BSC single chamber pacer with RV lead BS Guidant 9295    Pt is dependent per history.    PT on Plavix and coumadin

## 2023-12-22 DIAGNOSIS — I44.2 THIRD DEGREE AV BLOCK: Primary | ICD-10-CM

## 2023-12-28 ENCOUNTER — TRANSCRIBE ORDERS (OUTPATIENT)
Dept: CARDIOLOGY | Facility: CLINIC | Age: 88
End: 2023-12-28
Payer: MEDICARE

## 2023-12-28 DIAGNOSIS — I48.0 PAROXYSMAL ATRIAL FIBRILLATION: Primary | ICD-10-CM

## 2023-12-28 DIAGNOSIS — Z01.810 PRE-OPERATIVE CARDIOVASCULAR EXAMINATION: ICD-10-CM

## 2023-12-28 DIAGNOSIS — Z13.6 SCREENING FOR ISCHEMIC HEART DISEASE: ICD-10-CM

## 2024-01-03 ENCOUNTER — OFFICE VISIT (OUTPATIENT)
Age: 89
End: 2024-01-03
Payer: MEDICARE

## 2024-01-03 VITALS
HEIGHT: 60 IN | WEIGHT: 167 LBS | SYSTOLIC BLOOD PRESSURE: 128 MMHG | BODY MASS INDEX: 32.79 KG/M2 | OXYGEN SATURATION: 92 % | HEART RATE: 81 BPM | DIASTOLIC BLOOD PRESSURE: 70 MMHG

## 2024-01-03 DIAGNOSIS — I44.2 THIRD DEGREE AV BLOCK: ICD-10-CM

## 2024-01-03 DIAGNOSIS — E78.00 PURE HYPERCHOLESTEROLEMIA: ICD-10-CM

## 2024-01-03 DIAGNOSIS — I25.10 CORONARY ARTERY DISEASE INVOLVING NATIVE CORONARY ARTERY OF NATIVE HEART WITHOUT ANGINA PECTORIS: ICD-10-CM

## 2024-01-03 DIAGNOSIS — I25.10 CORONARY ARTERY DISEASE INVOLVING NATIVE CORONARY ARTERY OF NATIVE HEART, UNSPECIFIED WHETHER ANGINA PRESENT: Primary | ICD-10-CM

## 2024-01-03 DIAGNOSIS — R06.09 DYSPNEA ON EXERTION: ICD-10-CM

## 2024-01-03 DIAGNOSIS — I48.21 PERMANENT ATRIAL FIBRILLATION: ICD-10-CM

## 2024-01-03 NOTE — PROGRESS NOTES
[facility] Follow Up Office Note     Encounter Date:24  Patient:Vicky Stewart  :10/18/1933  MRN:[id number]      Chief Complaint:   No chief complaint on file.        History of Presenting Illness:        Vicky Stewart is a 89 y.o. female who is here for follow-up.  She is a patient of Dr Root.    Patient has past medical history that is significant for coronary artery disease, paroxysmal atrial fibrillation, permanent pacemaker, ischemic cardiomyopathy, chronic systolic heart failure, COPD.    In 2020 patient presented with inferior-anterior lateral STEMI and is status post CAROLYNN of mid RCA at that time.  She had residual distal left main and proximal LAD disease.  She was not felt to be candidate for surgical intervention and this was medically managed.  She had a repeat heart catheterization in 2020 due to recurrent angina and had a complex intervention to the left main into LAD and circumflex.  She has had persistent dyspnea on exertion and anginal symptoms which we are trying to medically managed with her frailty and age.      She has seen pulmonary, Dr. Orr, who does not feel this is a lung issue. Patient has been tried on Imdur and amlodipine in case small vessel disease contributing however these made her feel worse.      Patient saw Dr Root in . She was stable. Due to nuisance bleeding her Plavix was changed to 3x weekly.      Today patient reports she had a viral illness that stuck around for about 6 weeks and she is just now getting back into normal activities. She feels fatigued and a little more short of breath with activity, but she really did not do much for over a month prior to this week.  She denies chest pain, palpitations, GENOVEVA, PND or orthopnea.  She will need a generator change which is scheduled for February.      Review of Systems:  ROS    Current Outpatient Medications on File Prior to Visit   Medication Sig Dispense Refill    acetaminophen  (TYLENOL) 325 MG tablet Take 1 tablet by mouth 2 (Two) Times a Day As Needed for Mild Pain.      atorvastatin (LIPITOR) 40 MG tablet TAKE 1 TABLET BY MOUTH EVERY DAY 90 tablet 0    carbidopa-levodopa (SINEMET)  MG per tablet Take 1 tablet by mouth Every Night.  11    clopidogrel (PLAVIX) 75 MG tablet TAKE 1 TABLET BY MOUTH EVERY DAY 90 tablet 3    COLLAGEN PO Take 1 tablet by mouth Daily.      fluorouracil (EFUDEX) 5 % cream       fluticasone (FLONASE) 50 MCG/ACT nasal spray 2 sprays into the nostril(s) as directed by provider Daily.      furosemide (LASIX) 40 MG tablet TAKE 1 TABLET BY MOUTH EVERY DAY 90 tablet 3    loratadine (CLARITIN) 10 MG tablet Take 1 tablet by mouth Daily.      nitroglycerin (NITROSTAT) 0.4 MG SL tablet PLACE 1 TABLET UNDER TONGUE EVERY 5 MINS, UP TO 3 DOSES AS NEEDED FOR CHEST PAIN 100 tablet 11    pantoprazole (PROTONIX) 40 MG EC tablet TAKE 1 TABLET BY MOUTH EVERY DAY 90 tablet 3    rOPINIRole (REQUIP) 0.5 MG tablet       saccharomyces boulardii (FLORASTOR) 250 MG capsule Take 1 capsule by mouth 2 (Two) Times a Day.      spironolactone (ALDACTONE) 25 MG tablet Take 1 tablet by mouth Daily. 90 tablet 2    temazepam (RESTORIL) 7.5 MG capsule 1 capsule.  1    Umeclidinium-Vilanterol 62.5-25 MCG/INH aerosol powder  Inhale 1 puff Daily.      VENTOLIN  (90 BASE) MCG/ACT inhaler INHALE 2 PUFFS BY MOUTH EVERY 4 HOURS AS NEEDED  4    warfarin (COUMADIN) 2.5 MG tablet Atrial fibrillation  Indications: Atrial Fibrillation (Patient taking differently: Every Night. Atrial fibrillation  Indications: Atrial Fibrillation) 30 tablet 3     Current Facility-Administered Medications on File Prior to Visit   Medication Dose Route Frequency Provider Last Rate Last Admin    sodium chloride 0.9 % flush 10 mL  10 mL Intravenous PRN Quita Mahoney APRN           Allergies   Allergen Reactions    Penicillins Swelling    Carvedilol Other (See Comments)     MAKES HER VERY COLD    Codeine Nausea And  Vomiting    Lisinopril Dizziness    Ranexa [Ranolazine Er] Headache    Sulfa Antibiotics Nausea And Vomiting    Tramadol Nausea And Vomiting       Past Medical History:   Diagnosis Date    Acute MI, inferior wall 03/2020    Anemia     Atrial fibrillation     Atrial flutter     AV block, 3rd degree     Carotid artery stenosis     Chronic systolic heart failure     Dermatitis     GERD (gastroesophageal reflux disease)     History of EKG 06/17/2016    Hyperlipidemia     Hypertension     Ischemic cardiomyopathy     Low back pain     Osteoarthritis     knee    Osteoporosis     Pacemaker     Palpitations     Persistent atrial fibrillation     on warfarin    Raynaud's phenomenon     Skin cancer 09/14/2022    SOB (shortness of breath) on exertion     SSS (sick sinus syndrome)     URI, acute     Venous insufficiency        Past Surgical History:   Procedure Laterality Date    BILE DUCT STENT PLACEMENT      choledochal    BREAST BIOPSY Left     benign    CARDIAC CATHETERIZATION N/A 03/14/2020    Procedure: Coronary angiography;  Surgeon: Melquiades Root MD;  [facility];  Service: Cardiovascular;  Laterality: N/A;    CARDIAC CATHETERIZATION  03/14/2020    Procedure: Percutaneous Manual Thrombectomy;  Surgeon: Melquiades Root MD;  Location: [facility] CATH INVASIVE LOCATION;  Service: Cardiovascular;;    CARDIAC CATHETERIZATION N/A 03/14/2020    Procedure: Stent CAROLYNN coronary;  Surgeon: Melquiades Root MD;  Location: [facility] CATH INVASIVE LOCATION;  Service: Cardiovascular;  Laterality: N/A;    CARDIAC CATHETERIZATION Right 07/09/2020    Procedure: Percutaneous Coronary Intervention;  Surgeon: Melquiades Root MD;  Location: [facility] CATH INVASIVE LOCATION;  Service: Cardiovascular;  Laterality: Right;  unprotected L main PCI.  8 Fr sheath in R CFA     CARDIAC CATHETERIZATION N/A 07/09/2020    Procedure: Stent CAROLYNN coronary;  Surgeon: Melquiades Root MD;  Location: [facility] CATH INVASIVE LOCATION;   "Service: Cardiovascular;  Laterality: N/A;    CARDIAC CATHETERIZATION N/A 2020    Procedure: Atherectomy-coronary;  Surgeon: Melquiades Root MD;  Location:  [facility] CATH INVASIVE LOCATION;  Service: Cardiovascular;  Laterality: N/A;    CARDIAC ELECTROPHYSIOLOGY PROCEDURE N/A 2016    Procedure: Lead Revision PPM BOSTON;  Surgeon: Kale Méndez MD;  Location:  [facility] CATH INVASIVE LOCATION;  Service:     CATARACT EXTRACTION W/ INTRAOCULAR LENS  IMPLANT, BILATERAL Bilateral     CHOLECYSTECTOMY      COLONOSCOPY  2012    FACIAL COSMETIC SURGERY      OTHER SURGICAL HISTORY      interrogation of implantable loop recorder remote, up to 30 days    PACEMAKER IMPLANTATION      SINUS SURGERY      VARICOSE VEIN SURGERY Bilateral        Social History     Socioeconomic History    Marital status:    Tobacco Use    Smoking status: Former     Packs/day: 1.5     Types: Cigarettes     Start date:      Quit date:      Years since quittin.0     Passive exposure: Past    Smokeless tobacco: Never    Tobacco comments:     CAFFEINE USE: 3 CUPS COFFEE DAILY   Substance and Sexual Activity    Alcohol use: No    Drug use: No    Sexual activity: Defer     Comment:        Family History   Problem Relation Age of Onset    Heart disease Mother     Stroke Mother     Heart disease Father     Diabetes Sister     Hodgkin's lymphoma Sister     Heart disease Sister         pacemaker    Heart disease Other     Stroke Other     Endometrial cancer Sister        The following portions of the patient's history were reviewed and updated as appropriate: allergies, current medications, past family history, past medical history, past social history, past surgical history and problem list.       Objective:       Vitals:    24 1423   BP: 128/70   Pulse: 81   SpO2: 92%   Weight: 75.8 kg (167 lb)   Height: 151.1 cm (59.5\")         Physical Exam:  Constitutional: Well appearing, well developed, no acute distress   HENT: " Oropharynx clear and membrane moist  Eyes: Normal conjunctiva, no sclera icterus  Neck: Supple, no carotid bruit bilaterally  Cardiovascular: Regular rate and rhythm, Early peaking systolic murmur over the right upper sternal border, Trace bilateral lower extremity edema  Pulmonary: Normal respiratory effort, normal lung sounds, no wheezing  Neurological: Alert and orient x 3  Skin: Warm, dry, no ecchymosis, no rash  Psych: Appropriate mood and affect. Normal judgment and insight         Lab Results   Component Value Date     01/25/2023     06/07/2022    K 4.6 01/25/2023    K 4.0 06/07/2022     01/25/2023     06/07/2022    CO2 28.0 01/25/2023    CO2 24.0 06/07/2022    BUN 21 01/25/2023    BUN 20 06/07/2022    CREATININE 0.89 01/25/2023    CREATININE 0.84 06/07/2022    EGFRIFNONA 56 (L) 09/17/2020    EGFRIFNONA 52 (L) 08/07/2020    EGFRIFAFRI 72 07/05/2017    EGFRIFAFRI  09/15/2016      Comment:      <15 Indicative of kidney failure.    GLUCOSE 90 01/25/2023    GLUCOSE 89 06/07/2022    CALCIUM 9.6 01/25/2023    CALCIUM 9.8 06/07/2022    PROTENTOTREF 6.4 07/05/2017    ALBUMIN 4.3 01/25/2023    ALBUMIN 4.50 06/07/2022    BILITOT 1.2 01/25/2023    BILITOT 0.7 06/07/2022    AST 19 01/25/2023    AST 24 06/07/2022    ALT 21 01/25/2023    ALT 24 06/07/2022     Lab Results   Component Value Date    WBC 9.06 01/25/2023    WBC 5.92 06/07/2022    HGB 11.5 (L) 01/25/2023    HGB 10.6 (L) 06/07/2022    HCT 36.0 01/25/2023    HCT 33.0 (L) 06/07/2022    MCV 89.8 01/25/2023    MCV 80.5 06/07/2022     01/25/2023     06/07/2022     Lab Results   Component Value Date    CHOL 141 07/10/2020    CHOL 121 03/15/2020    TRIG 62 07/10/2020    TRIG 67 03/15/2020    HDL 67 (H) 07/10/2020    HDL 63 (H) 03/15/2020    LDL 62 07/10/2020    LDL 45 03/15/2020     Lab Results   Component Value Date    PROBNP 1,244.0 01/25/2023    PROBNP 807.0 06/07/2022     Lab Results   Component Value Date    CKMB 37.90 (H)  03/16/2020    TROPONINT <0.010 08/07/2020     Lab Results   Component Value Date    TSH 1.480 01/25/2023    TSH 1.150 06/07/2022           ECG 12 Lead    Date/Time: 1/3/2024 3:03 PM  Performed by: Carlita Cordova APRN    Authorized by: Carlita Cordova APRN  Comparison: compared with previous ECG from 6/28/2023  Similar to previous ECG  Rhythm: paced  Rate: normal  BPM: 81             Assessment:          Diagnosis Plan   1. Coronary artery disease involving native coronary artery of native heart, unspecified whether angina present  ECG 12 Lead      2. Permanent atrial fibrillation        3. Third degree AV block        4. Dyspnea on exertion        5. Pure hypercholesterolemia        6. Coronary artery disease involving native coronary artery of native heart without angina pectoris                 Plan:       Coronary artery disease - history of PCI in setting of inferior-anterior lateral STEMI 3/2020 followed by high risk PCI of left main into LCx and LAD 3/2020.  She has been intolerant of antianginals including beta-blockers, long-acting nitrates, Ranexa, amlodipine.  She remains on clopidogrel (3x weekly for nuisance bleeding) and warfarin. Stress PET 2/2023 without evidence of ischemia    Ischemic cardiomyopathy - most recent echocardiogram 3/2022 with preserved LVEF.  She appears compensated on exam    Mitral and tricuspid regurgitation - moderate on echo 3/2022.  Clinically she appears stable    Essential hypertension - blood pressure is a little high today.  She is concerned about starting new medications as she has been sensitive to them.  She did not tolerate starting amlodipine.  I asked her to keep a home blood pressure log to bring to her next appointment    Permanent atrial fibrillation - she is rate controlled and on anticoagulation with warfarin    Complete heart block status post permanent pacemaker - battery is near end of life. Generator change scheduled    Patient is seen today for  follow-up.  She is a little fatigued and more short of breath but this seems related to recent viral illness. I am not recommending any changes. Generator exchange is scheduled. Follow-up with Dr Root in 6 months, earlier with problems    Orders Placed This Encounter   Procedures    ECG 12 Lead     This order was created via procedure documentation     Order Specific Question:   Release to patient     Answer:   Routine Release [[id number]]        RONEL Ayala  [facility]  01/03/24  15:07 EST

## 2024-01-11 ENCOUNTER — TELEPHONE (OUTPATIENT)
Dept: CARDIOLOGY | Facility: CLINIC | Age: 89
End: 2024-01-11

## 2024-01-11 NOTE — TELEPHONE ENCOUNTER
Caller: Vicky Stewart    Relationship: Self    Best call back number: [phone]    What is the best time to reach you: TOMORROW MORNING    Who are you requesting to speak with (clinical staff, provider,  specific staff member): PACEMAKER DEPARTMENT    Do you know the name of the person who called:     What was the call regarding: PATIENT WOULD LIKE A CALLBACK IN REGARDS TO HER UPCOMING BATTERY CHANGE ON 2.6.24. PATIENT HAS SEVERAL QUESTIONS REGARDING THE PROCEDURE. PLEASE CONTACT PATIENT AND ADVISE    Is it okay if the provider responds through MyChart: NO, PLEASE CALL

## 2024-01-29 RX ORDER — CLOPIDOGREL BISULFATE 75 MG/1
TABLET ORAL
Qty: 90 TABLET | Refills: 3 | Status: SHIPPED | OUTPATIENT
Start: 2024-01-29

## 2024-02-02 ENCOUNTER — LAB (OUTPATIENT)
Dept: LAB | Facility: HOSPITAL | Age: 89
End: 2024-02-02
Payer: MEDICARE

## 2024-02-02 DIAGNOSIS — Z01.810 PRE-OPERATIVE CARDIOVASCULAR EXAMINATION: ICD-10-CM

## 2024-02-02 DIAGNOSIS — I48.0 PAROXYSMAL ATRIAL FIBRILLATION: ICD-10-CM

## 2024-02-02 DIAGNOSIS — Z13.6 SCREENING FOR ISCHEMIC HEART DISEASE: ICD-10-CM

## 2024-02-02 LAB
ANION GAP SERPL CALCULATED.3IONS-SCNC: 9.5 MMOL/L (ref 5–15)
BASOPHILS # BLD AUTO: 0.03 10*3/MM3 (ref 0–0.2)
BASOPHILS NFR BLD AUTO: 0.4 % (ref 0–1.5)
BUN SERPL-MCNC: 19 MG/DL (ref 8–23)
BUN/CREAT SERPL: 21.6 (ref 7–25)
CALCIUM SPEC-SCNC: 9.5 MG/DL (ref 8.2–9.6)
CHLORIDE SERPL-SCNC: 104 MMOL/L (ref 98–107)
CO2 SERPL-SCNC: 27.5 MMOL/L (ref 22–29)
CREAT SERPL-MCNC: 0.88 MG/DL (ref 0.57–1)
DEPRECATED RDW RBC AUTO: 43.5 FL (ref 37–54)
EGFRCR SERPLBLD CKD-EPI 2021: 62.5 ML/MIN/1.73
EOSINOPHIL # BLD AUTO: 0.08 10*3/MM3 (ref 0–0.4)
EOSINOPHIL NFR BLD AUTO: 0.9 % (ref 0.3–6.2)
ERYTHROCYTE [DISTWIDTH] IN BLOOD BY AUTOMATED COUNT: 13.4 % (ref 12.3–15.4)
GLUCOSE SERPL-MCNC: 92 MG/DL (ref 65–99)
HCT VFR BLD AUTO: 34.4 % (ref 34–46.6)
HGB BLD-MCNC: 11.1 G/DL (ref 12–15.9)
IMM GRANULOCYTES # BLD AUTO: 0.1 10*3/MM3 (ref 0–0.05)
IMM GRANULOCYTES NFR BLD AUTO: 1.2 % (ref 0–0.5)
INR PPP: 2.67 (ref 0.9–1.1)
LYMPHOCYTES # BLD AUTO: 1.61 10*3/MM3 (ref 0.7–3.1)
LYMPHOCYTES NFR BLD AUTO: 18.8 % (ref 19.6–45.3)
MCH RBC QN AUTO: 28.8 PG (ref 26.6–33)
MCHC RBC AUTO-ENTMCNC: 32.3 G/DL (ref 31.5–35.7)
MCV RBC AUTO: 89.4 FL (ref 79–97)
MONOCYTES # BLD AUTO: 0.83 10*3/MM3 (ref 0.1–0.9)
MONOCYTES NFR BLD AUTO: 9.7 % (ref 5–12)
NEUTROPHILS NFR BLD AUTO: 5.91 10*3/MM3 (ref 1.7–7)
NEUTROPHILS NFR BLD AUTO: 69 % (ref 42.7–76)
NRBC BLD AUTO-RTO: 0 /100 WBC (ref 0–0.2)
PLATELET # BLD AUTO: 288 10*3/MM3 (ref 140–450)
PMV BLD AUTO: 11 FL (ref 6–12)
POTASSIUM SERPL-SCNC: 3.9 MMOL/L (ref 3.5–5.2)
PROTHROMBIN TIME: 29 SECONDS (ref 11.7–14.2)
RBC # BLD AUTO: 3.85 10*6/MM3 (ref 3.77–5.28)
SODIUM SERPL-SCNC: 141 MMOL/L (ref 136–145)
WBC NRBC COR # BLD AUTO: 8.56 10*3/MM3 (ref 3.4–10.8)

## 2024-02-02 PROCEDURE — 80048 BASIC METABOLIC PNL TOTAL CA: CPT

## 2024-02-02 PROCEDURE — 36415 COLL VENOUS BLD VENIPUNCTURE: CPT

## 2024-02-02 PROCEDURE — 85610 PROTHROMBIN TIME: CPT

## 2024-02-02 PROCEDURE — 85025 COMPLETE CBC W/AUTO DIFF WBC: CPT

## 2024-02-06 ENCOUNTER — HOSPITAL ENCOUNTER (OUTPATIENT)
Facility: HOSPITAL | Age: 89
Setting detail: HOSPITAL OUTPATIENT SURGERY
Discharge: HOME | End: 2024-02-06
Attending: INTERNAL MEDICINE | Admitting: INTERNAL MEDICINE
Payer: MEDICARE

## 2024-02-06 VITALS
HEART RATE: 80 BPM | TEMPERATURE: 98.2 F | SYSTOLIC BLOOD PRESSURE: 121 MMHG | OXYGEN SATURATION: 97 % | DIASTOLIC BLOOD PRESSURE: 98 MMHG | WEIGHT: 125 LBS | HEIGHT: 61 IN | BODY MASS INDEX: 23.6 KG/M2 | RESPIRATION RATE: 16 BRPM

## 2024-02-06 DIAGNOSIS — I49.5 SICK SINUS SYNDROME: Primary | ICD-10-CM

## 2024-02-06 DIAGNOSIS — I44.2 THIRD DEGREE AV BLOCK: ICD-10-CM

## 2024-02-06 LAB
INR PPP: 1.2 (ref 0.8–1.2)
PROTHROMBIN TIME: 14.5 SECONDS (ref 12.8–15.2)
QT INTERVAL: 417 MS
QTC INTERVAL: 482 MS

## 2024-02-06 PROCEDURE — S0260 H&P FOR SURGERY: HCPCS | Performed by: INTERNAL MEDICINE

## 2024-02-06 PROCEDURE — 93010 ELECTROCARDIOGRAM REPORT: CPT | Performed by: INTERNAL MEDICINE

## 2024-02-06 PROCEDURE — 93005 ELECTROCARDIOGRAM TRACING: CPT | Performed by: INTERNAL MEDICINE

## 2024-02-06 PROCEDURE — 33227 REMOVE&REPLACE PM GEN SINGL: CPT | Performed by: INTERNAL MEDICINE

## 2024-02-06 PROCEDURE — 25810000003 SODIUM CHLORIDE 0.9 % SOLUTION 250 ML FLEX CONT: Performed by: INTERNAL MEDICINE

## 2024-02-06 PROCEDURE — 25010000002 VANCOMYCIN 750 MG RECONSTITUTED SOLUTION 1 EACH VIAL: Performed by: INTERNAL MEDICINE

## 2024-02-06 PROCEDURE — 25010000002 MIDAZOLAM PER 1 MG: Performed by: INTERNAL MEDICINE

## 2024-02-06 PROCEDURE — 85610 PROTHROMBIN TIME: CPT

## 2024-02-06 PROCEDURE — 25810000003 SODIUM CHLORIDE 0.9 % SOLUTION: Performed by: INTERNAL MEDICINE

## 2024-02-06 PROCEDURE — 25010000002 FENTANYL CITRATE (PF) 50 MCG/ML SOLUTION: Performed by: INTERNAL MEDICINE

## 2024-02-06 PROCEDURE — C1786 PMKR, SINGLE, RATE-RESP: HCPCS | Performed by: INTERNAL MEDICINE

## 2024-02-06 DEVICE — IMPLANTABLE DEVICE: Type: IMPLANTABLE DEVICE | Status: FUNCTIONAL

## 2024-02-06 RX ORDER — SODIUM CHLORIDE 0.9 % (FLUSH) 0.9 %
10 SYRINGE (ML) INJECTION AS NEEDED
Status: DISCONTINUED | OUTPATIENT
Start: 2024-02-06 | End: 2024-02-06 | Stop reason: HOSPADM

## 2024-02-06 RX ORDER — ACETAMINOPHEN 650 MG/1
650 SUPPOSITORY RECTAL EVERY 4 HOURS PRN
Status: DISCONTINUED | OUTPATIENT
Start: 2024-02-06 | End: 2024-02-06 | Stop reason: HOSPADM

## 2024-02-06 RX ORDER — MIDAZOLAM HYDROCHLORIDE 1 MG/ML
INJECTION INTRAMUSCULAR; INTRAVENOUS
Status: DISCONTINUED | OUTPATIENT
Start: 2024-02-06 | End: 2024-02-06 | Stop reason: HOSPADM

## 2024-02-06 RX ORDER — ACETAMINOPHEN 325 MG/1
650 TABLET ORAL EVERY 4 HOURS PRN
Status: DISCONTINUED | OUTPATIENT
Start: 2024-02-06 | End: 2024-02-06 | Stop reason: HOSPADM

## 2024-02-06 RX ORDER — ERGOCALCIFEROL (VITAMIN D2) 10 MCG
400 TABLET ORAL DAILY
COMMUNITY

## 2024-02-06 RX ORDER — SODIUM CHLORIDE 9 MG/ML
40 INJECTION, SOLUTION INTRAVENOUS AS NEEDED
Status: DISCONTINUED | OUTPATIENT
Start: 2024-02-06 | End: 2024-02-06 | Stop reason: HOSPADM

## 2024-02-06 RX ORDER — FENTANYL CITRATE 50 UG/ML
INJECTION, SOLUTION INTRAMUSCULAR; INTRAVENOUS
Status: DISCONTINUED | OUTPATIENT
Start: 2024-02-06 | End: 2024-02-06 | Stop reason: HOSPADM

## 2024-02-06 RX ORDER — NITROGLYCERIN 0.4 MG/1
0.4 TABLET SUBLINGUAL
Status: DISCONTINUED | OUTPATIENT
Start: 2024-02-06 | End: 2024-02-06 | Stop reason: HOSPADM

## 2024-02-06 RX ORDER — SODIUM CHLORIDE 9 MG/ML
75 INJECTION, SOLUTION INTRAVENOUS CONTINUOUS
Status: DISCONTINUED | OUTPATIENT
Start: 2024-02-06 | End: 2024-02-06 | Stop reason: HOSPADM

## 2024-02-06 RX ORDER — SODIUM CHLORIDE 0.9 % (FLUSH) 0.9 %
10 SYRINGE (ML) INJECTION EVERY 12 HOURS SCHEDULED
Status: DISCONTINUED | OUTPATIENT
Start: 2024-02-06 | End: 2024-02-06 | Stop reason: HOSPADM

## 2024-02-06 RX ADMIN — VANCOMYCIN HYDROCHLORIDE 750 MG: 750 INJECTION, POWDER, LYOPHILIZED, FOR SOLUTION INTRAVENOUS at 10:44

## 2024-02-06 RX ADMIN — SODIUM CHLORIDE 75 ML/HR: 9 INJECTION, SOLUTION INTRAVENOUS at 10:33

## 2024-02-06 NOTE — H&P
89 y.o. female who is here for follow-up.  She is a patient of Dr Root.     Patient has past medical history that is significant for coronary artery disease, paroxysmal atrial fibrillation, permanent pacemaker, ischemic cardiomyopathy, chronic systolic heart failure, COPD.     In March 2020 patient presented with inferior-anterior lateral STEMI and is status post CAROLYNN of mid RCA at that time.  She had residual distal left main and proximal LAD disease.  She was not felt to be candidate for surgical intervention and this was medically managed.  She had a repeat heart catheterization in July 2020 due to recurrent angina and had a complex intervention to the left main into LAD and circumflex.  She has had persistent dyspnea on exertion and anginal symptoms which we are trying to medically managed with her frailty and age.       She has seen pulmonary, Dr. Orr, who does not feel this is a lung issue. Patient has been tried on Imdur and amlodipine in case small vessel disease contributing however these made her feel worse.       Patient saw Dr Root in June. She was stable. Due to nuisance bleeding her Plavix was changed to 3x weekly.       Today patient reports she had a viral illness that stuck around for about 6 weeks and she is just now getting back into normal activities. She feels fatigued and a little more short of breath with activity, but she really did not do much for over a month prior to this week.  She denies chest pain, palpitations, GENOVEVA, PND or orthopnea.  She will need a generator change which is scheduled for February.        Review of Systems:  ROS            Current Outpatient Medications on File Prior to Visit   Medication Sig Dispense Refill    acetaminophen (TYLENOL) 325 MG tablet Take 1 tablet by mouth 2 (Two) Times a Day As Needed for Mild Pain.        atorvastatin (LIPITOR) 40 MG tablet TAKE 1 TABLET BY MOUTH EVERY DAY 90 tablet 0    carbidopa-levodopa (SINEMET)  MG per tablet Take 1  tablet by mouth Every Night.   11    clopidogrel (PLAVIX) 75 MG tablet TAKE 1 TABLET BY MOUTH EVERY DAY 90 tablet 3    COLLAGEN PO Take 1 tablet by mouth Daily.        fluorouracil (EFUDEX) 5 % cream          fluticasone (FLONASE) 50 MCG/ACT nasal spray 2 sprays into the nostril(s) as directed by provider Daily.        furosemide (LASIX) 40 MG tablet TAKE 1 TABLET BY MOUTH EVERY DAY 90 tablet 3    loratadine (CLARITIN) 10 MG tablet Take 1 tablet by mouth Daily.        nitroglycerin (NITROSTAT) 0.4 MG SL tablet PLACE 1 TABLET UNDER TONGUE EVERY 5 MINS, UP TO 3 DOSES AS NEEDED FOR CHEST PAIN 100 tablet 11    pantoprazole (PROTONIX) 40 MG EC tablet TAKE 1 TABLET BY MOUTH EVERY DAY 90 tablet 3    rOPINIRole (REQUIP) 0.5 MG tablet          saccharomyces boulardii (FLORASTOR) 250 MG capsule Take 1 capsule by mouth 2 (Two) Times a Day.        spironolactone (ALDACTONE) 25 MG tablet Take 1 tablet by mouth Daily. 90 tablet 2    temazepam (RESTORIL) 7.5 MG capsule 1 capsule.   1    Umeclidinium-Vilanterol 62.5-25 MCG/INH aerosol powder  Inhale 1 puff Daily.        VENTOLIN  (90 BASE) MCG/ACT inhaler INHALE 2 PUFFS BY MOUTH EVERY 4 HOURS AS NEEDED   4    warfarin (COUMADIN) 2.5 MG tablet Atrial fibrillation  Indications: Atrial Fibrillation (Patient taking differently: Every Night. Atrial fibrillation  Indications: Atrial Fibrillation) 30 tablet 3                Current Facility-Administered Medications on File Prior to Visit   Medication Dose Route Frequency Provider Last Rate Last Admin    sodium chloride 0.9 % flush 10 mL  10 mL Intravenous PRN Quita Mahoney, RONEL                   Allergies   Allergen Reactions    Penicillins Swelling    Carvedilol Other (See Comments)       MAKES HER VERY COLD    Codeine Nausea And Vomiting    Lisinopril Dizziness    Ranexa [Ranolazine Er] Headache    Sulfa Antibiotics Nausea And Vomiting    Tramadol Nausea And Vomiting         Medical History        Past Medical History:    Diagnosis Date    Acute MI, inferior wall 03/2020    Anemia      Atrial fibrillation      Atrial flutter      AV block, 3rd degree      Carotid artery stenosis      Chronic systolic heart failure      Dermatitis      GERD (gastroesophageal reflux disease)      History of EKG 06/17/2016    Hyperlipidemia      Hypertension      Ischemic cardiomyopathy      Low back pain      Osteoarthritis       knee    Osteoporosis      Pacemaker      Palpitations      Persistent atrial fibrillation       on warfarin    Raynaud's phenomenon      Skin cancer 09/14/2022    SOB (shortness of breath) on exertion      SSS (sick sinus syndrome)      URI, acute      Venous insufficiency              Surgical History         Past Surgical History:   Procedure Laterality Date    BILE DUCT STENT PLACEMENT         choledochal    BREAST BIOPSY Left       benign    CARDIAC CATHETERIZATION N/A 03/14/2020     Procedure: Coronary angiography;  Surgeon: Melquiades Root MD;  [facility];  Service: Cardiovascular;  Laterality: N/A;    CARDIAC CATHETERIZATION   03/14/2020     Procedure: Percutaneous Manual Thrombectomy;  Surgeon: Melquiades Root MD;  [facility];  Service: Cardiovascular;;    CARDIAC CATHETERIZATION N/A 03/14/2020     Procedure: Stent CAROLYNN coronary;  Surgeon: Melquiades Root MD;  Location: [facility] CATH INVASIVE LOCATION;  Service: Cardiovascular;  Laterality: N/A;    CARDIAC CATHETERIZATION Right 07/09/2020     Procedure: Percutaneous Coronary Intervention;  Surgeon: Melquiades Root MD;  Location: [facility] CATH INVASIVE LOCATION;  Service: Cardiovascular;  Laterality: Right;  unprotected L main PCI.  8 Fr sheath in R CFA     CARDIAC CATHETERIZATION N/A 07/09/2020     Procedure: Stent CAROLYNN coronary;  Surgeon: Melquiades Root MD;  Location: [facility] CATH INVASIVE LOCATION;  Service: Cardiovascular;  Laterality: N/A;    CARDIAC CATHETERIZATION N/A 07/09/2020     Procedure:  Atherectomy-coronary;  Surgeon: Melquiades Root MD;  Location:  [facility] CATH INVASIVE LOCATION;  Service: Cardiovascular;  Laterality: N/A;    CARDIAC ELECTROPHYSIOLOGY PROCEDURE N/A 2016     Procedure: Lead Revision PPM BOSTON;  Surgeon: Kale Méndez MD;  Location:  [facility] CATH INVASIVE LOCATION;  Service:     CATARACT EXTRACTION W/ INTRAOCULAR LENS  IMPLANT, BILATERAL Bilateral      CHOLECYSTECTOMY        COLONOSCOPY   2012    FACIAL COSMETIC SURGERY        OTHER SURGICAL HISTORY         interrogation of implantable loop recorder remote, up to 30 days    PACEMAKER IMPLANTATION        SINUS SURGERY        VARICOSE VEIN SURGERY Bilateral              Social History   Social History            Socioeconomic History    Marital status:    Tobacco Use    Smoking status: Former       Packs/day: 1.5       Types: Cigarettes       Start date:        Quit date:        Years since quittin.0       Passive exposure: Past    Smokeless tobacco: Never    Tobacco comments:       CAFFEINE USE: 3 CUPS COFFEE DAILY   Substance and Sexual Activity    Alcohol use: No    Drug use: No    Sexual activity: Defer       Comment:                   Family History   Problem Relation Age of Onset    Heart disease Mother      Stroke Mother      Heart disease Father      Diabetes Sister      Hodgkin's lymphoma Sister      Heart disease Sister           pacemaker    Heart disease Other      Stroke Other      Endometrial cancer Sister           The following portions of the patient's history were reviewed and updated as appropriate: allergies, current medications, past family history, past medical history, past social history, past surgical history and problem list.           Objective:            Physical Exam:  Constitutional: Well appearing, well developed, no acute distress   HENT: Oropharynx clear and membrane moist  Eyes: Normal conjunctiva, no sclera icterus  Neck: Supple, no carotid bruit  bilaterally  Cardiovascular: Regular rate and rhythm, Early peaking systolic murmur over the right upper sternal border, Trace bilateral lower extremity edema  Pulmonary: Normal respiratory effort, normal lung sounds, no wheezing  Neurological: Alert and orient x 3  Skin: Warm, dry, no ecchymosis, no rash  Psych: Appropriate mood and affect. Normal judgment and insight                   Lab Results   Component Value Date      01/25/2023      06/07/2022     K 4.6 01/25/2023     K 4.0 06/07/2022      01/25/2023      06/07/2022     CO2 28.0 01/25/2023     CO2 24.0 06/07/2022     BUN 21 01/25/2023     BUN 20 06/07/2022     CREATININE 0.89 01/25/2023     CREATININE 0.84 06/07/2022     EGFRIFNONA 56 (L) 09/17/2020     EGFRIFNONA 52 (L) 08/07/2020     EGFRIFAFRI 72 07/05/2017     EGFRIFAFRI   09/15/2016         Comment:         <15 Indicative of kidney failure.     GLUCOSE 90 01/25/2023     GLUCOSE 89 06/07/2022     CALCIUM 9.6 01/25/2023     CALCIUM 9.8 06/07/2022     PROTENTOTREF 6.4 07/05/2017     ALBUMIN 4.3 01/25/2023     ALBUMIN 4.50 06/07/2022     BILITOT 1.2 01/25/2023     BILITOT 0.7 06/07/2022     AST 19 01/25/2023     AST 24 06/07/2022     ALT 21 01/25/2023     ALT 24 06/07/2022            Lab Results   Component Value Date     WBC 9.06 01/25/2023     WBC 5.92 06/07/2022     HGB 11.5 (L) 01/25/2023     HGB 10.6 (L) 06/07/2022     HCT 36.0 01/25/2023     HCT 33.0 (L) 06/07/2022     MCV 89.8 01/25/2023     MCV 80.5 06/07/2022      01/25/2023      06/07/2022            Lab Results   Component Value Date     CHOL 141 07/10/2020     CHOL 121 03/15/2020     TRIG 62 07/10/2020     TRIG 67 03/15/2020     HDL 67 (H) 07/10/2020     HDL 63 (H) 03/15/2020     LDL 62 07/10/2020     LDL 45 03/15/2020            Lab Results   Component Value Date     PROBNP 1,244.0 01/25/2023     PROBNP 807.0 06/07/2022            Lab Results   Component Value Date     CKMB 37.90 (H) 03/16/2020      TROPONINT <0.010 08/07/2020            Lab Results   Component Value Date     TSH 1.480 01/25/2023     TSH 1.150 06/07/2022                        Assessment:      Assessment     Diagnosis Plan   1. Coronary artery disease involving native coronary artery of native heart, unspecified whether angina present  ECG 12 Lead       2. Permanent atrial fibrillation          3. Third degree AV block          4. Dyspnea on exertion          5. Pure hypercholesterolemia          6. Coronary artery disease involving native coronary artery of native heart without angina pectoris                         Plan:      Plan    She is at [facility] and we will plan pacemaker gen change. Risks discussed

## 2024-02-06 NOTE — DISCHARGE INSTRUCTIONS
[facility]   [phone]    Post Pacemaker / Defibrillator Implant Instructions                1. You may shower in 24hrs. Do not allow shower water to hit directly on incision.    2. No lotion/powder/ointment/cream on incision until it is healed.    3.Gently wash incision daily with soap and water and pat dry.    4.You may reapply a dressing if there is drainage, otherwise leave your incision open to air. If you reapply a dressing, please notify the pacemaker clinic.    5. No heavy lifting, pulling, or pushing x1wk    6. The pacemaker clinic will contact you (usually within 1 business day) to schedule a pacemaker/incision check. The check is usually done 7-10 days post-implant. If you have not heard from the pacemaker clinic within 3 days, please call the office.    7.   Please call the office if you experience any of the following:   bleeding or drainage from your incision   swelling, redness, or opening of your incision   fever or chills   pain not relieved with medication   chest pain or difficulty breathing   lightheadness

## 2024-02-14 ENCOUNTER — CLINICAL SUPPORT NO REQUIREMENTS (OUTPATIENT)
Age: 89
End: 2024-02-14
Payer: MEDICARE

## 2024-02-14 DIAGNOSIS — I44.2 THIRD DEGREE AV BLOCK: Primary | ICD-10-CM

## 2024-02-14 PROCEDURE — 93279 PRGRMG DEV EVAL PM/LDLS PM: CPT | Performed by: INTERNAL MEDICINE

## 2024-04-25 RX ORDER — PANTOPRAZOLE SODIUM 40 MG/1
TABLET, DELAYED RELEASE ORAL
Qty: 90 TABLET | Refills: 3 | Status: SHIPPED | OUTPATIENT
Start: 2024-04-25

## 2024-05-13 ENCOUNTER — TRANSCRIBE ORDERS (OUTPATIENT)
Dept: ADMINISTRATIVE | Facility: HOSPITAL | Age: 89
End: 2024-05-13
Payer: MEDICARE

## 2024-05-13 DIAGNOSIS — M81.0 SENILE OSTEOPOROSIS: Primary | ICD-10-CM

## 2024-06-05 RX ORDER — ZOLEDRONIC ACID 5 MG/100ML
5 INJECTION, SOLUTION INTRAVENOUS ONCE
Status: CANCELLED | OUTPATIENT
Start: 2024-06-07

## 2024-06-07 ENCOUNTER — HOSPITAL ENCOUNTER (OUTPATIENT)
Dept: INFUSION THERAPY | Facility: HOSPITAL | Age: 89
Discharge: HOME | End: 2024-06-07
Admitting: OBSTETRICS & GYNECOLOGY
Payer: MEDICARE

## 2024-06-07 VITALS
TEMPERATURE: 97.7 F | SYSTOLIC BLOOD PRESSURE: 142 MMHG | BODY MASS INDEX: 23.62 KG/M2 | DIASTOLIC BLOOD PRESSURE: 74 MMHG | RESPIRATION RATE: 18 BRPM | HEART RATE: 85 BPM | WEIGHT: 125 LBS

## 2024-06-07 DIAGNOSIS — M81.0 AGE-RELATED OSTEOPOROSIS WITHOUT CURRENT PATHOLOGICAL FRACTURE: Primary | ICD-10-CM

## 2024-06-07 DIAGNOSIS — M81.0 SENILE OSTEOPOROSIS: ICD-10-CM

## 2024-06-07 PROCEDURE — 25010000002 ZOLEDRONIC ACID 5 MG/100ML SOLUTION: Performed by: OBSTETRICS & GYNECOLOGY

## 2024-06-07 PROCEDURE — 96374 THER/PROPH/DIAG INJ IV PUSH: CPT

## 2024-06-07 RX ORDER — TOBRAMYCIN 3 MG/ML
2 SOLUTION/ DROPS OPHTHALMIC DAILY PRN
COMMUNITY
Start: 2024-05-24

## 2024-06-07 RX ORDER — ZOLEDRONIC ACID 5 MG/100ML
5 INJECTION, SOLUTION INTRAVENOUS ONCE
Status: CANCELLED | OUTPATIENT
Start: 2024-06-07

## 2024-06-07 RX ORDER — ZOLEDRONIC ACID 5 MG/100ML
5 INJECTION, SOLUTION INTRAVENOUS ONCE
Status: COMPLETED | OUTPATIENT
Start: 2024-06-07 | End: 2024-06-07

## 2024-06-07 RX ADMIN — ZOLEDRONIC ACID 5 MG: 5 INJECTION INTRAVENOUS at 11:50

## 2024-06-07 NOTE — PROGRESS NOTES
Labs obtained/drawn on 5/10/24  Labs reviewed and within normal limits to receive Reclast.   Calcium 10.1  Patient state last received Reclast on 7/25/18  Crockcroft- Gault score calculated 42.9             Pt tolerated well.

## 2024-07-03 ENCOUNTER — CLINICAL SUPPORT NO REQUIREMENTS (OUTPATIENT)
Age: 89
End: 2024-07-03
Payer: MEDICARE

## 2024-07-03 ENCOUNTER — OFFICE VISIT (OUTPATIENT)
Dept: CARDIOLOGY | Facility: CLINIC | Age: 89
End: 2024-07-03
Payer: MEDICARE

## 2024-07-03 ENCOUNTER — HOSPITAL ENCOUNTER (OUTPATIENT)
Dept: CARDIOLOGY | Facility: HOSPITAL | Age: 89
Discharge: HOME | End: 2024-07-03
Admitting: INTERNAL MEDICINE
Payer: MEDICARE

## 2024-07-03 VITALS
SYSTOLIC BLOOD PRESSURE: 156 MMHG | HEART RATE: 80 BPM | BODY MASS INDEX: 23.98 KG/M2 | DIASTOLIC BLOOD PRESSURE: 90 MMHG | HEIGHT: 61 IN | WEIGHT: 127 LBS

## 2024-07-03 DIAGNOSIS — Z95.0 CARDIAC PACEMAKER IN SITU: ICD-10-CM

## 2024-07-03 DIAGNOSIS — R06.09 DYSPNEA ON EXERTION: ICD-10-CM

## 2024-07-03 DIAGNOSIS — R53.83 OTHER FATIGUE: ICD-10-CM

## 2024-07-03 DIAGNOSIS — I25.10 CORONARY ARTERY DISEASE INVOLVING NATIVE CORONARY ARTERY OF NATIVE HEART WITHOUT ANGINA PECTORIS: ICD-10-CM

## 2024-07-03 DIAGNOSIS — I48.21 PERMANENT ATRIAL FIBRILLATION: ICD-10-CM

## 2024-07-03 DIAGNOSIS — I10 PRIMARY HYPERTENSION: ICD-10-CM

## 2024-07-03 DIAGNOSIS — I48.21 PERMANENT ATRIAL FIBRILLATION: Primary | ICD-10-CM

## 2024-07-03 DIAGNOSIS — I73.00 RAYNAUD'S PHENOMENON WITHOUT GANGRENE: ICD-10-CM

## 2024-07-03 DIAGNOSIS — I44.2 THIRD DEGREE AV BLOCK: Primary | ICD-10-CM

## 2024-07-03 PROBLEM — R68.89 COLD FEELING: Status: ACTIVE | Noted: 2024-07-03

## 2024-07-03 LAB
ANION GAP SERPL CALCULATED.3IONS-SCNC: 12 MMOL/L (ref 5–15)
BASOPHILS # BLD AUTO: 0.04 10*3/MM3 (ref 0–0.2)
BASOPHILS NFR BLD AUTO: 0.7 % (ref 0–1.5)
BUN SERPL-MCNC: 19 MG/DL (ref 8–23)
BUN/CREAT SERPL: 20 (ref 7–25)
CALCIUM SPEC-SCNC: 9.7 MG/DL (ref 8.2–9.6)
CHLORIDE SERPL-SCNC: 101 MMOL/L (ref 98–107)
CO2 SERPL-SCNC: 26 MMOL/L (ref 22–29)
CREAT SERPL-MCNC: 0.95 MG/DL (ref 0.57–1)
DEPRECATED RDW RBC AUTO: 43.9 FL (ref 37–54)
EGFRCR SERPLBLD CKD-EPI 2021: 57 ML/MIN/1.73
EOSINOPHIL # BLD AUTO: 0.04 10*3/MM3 (ref 0–0.4)
EOSINOPHIL NFR BLD AUTO: 0.7 % (ref 0.3–6.2)
ERYTHROCYTE [DISTWIDTH] IN BLOOD BY AUTOMATED COUNT: 13.8 % (ref 12.3–15.4)
FERRITIN SERPL-MCNC: 191 NG/ML (ref 13–150)
GLUCOSE SERPL-MCNC: 87 MG/DL (ref 65–99)
HCT VFR BLD AUTO: 35.9 % (ref 34–46.6)
HGB BLD-MCNC: 11.8 G/DL (ref 12–15.9)
IMM GRANULOCYTES # BLD AUTO: 0.04 10*3/MM3 (ref 0–0.05)
IMM GRANULOCYTES NFR BLD AUTO: 0.7 % (ref 0–0.5)
LYMPHOCYTES # BLD AUTO: 1.16 10*3/MM3 (ref 0.7–3.1)
LYMPHOCYTES NFR BLD AUTO: 21.7 % (ref 19.6–45.3)
MCH RBC QN AUTO: 29.1 PG (ref 26.6–33)
MCHC RBC AUTO-ENTMCNC: 32.9 G/DL (ref 31.5–35.7)
MCV RBC AUTO: 88.4 FL (ref 79–97)
MONOCYTES # BLD AUTO: 0.65 10*3/MM3 (ref 0.1–0.9)
MONOCYTES NFR BLD AUTO: 12.2 % (ref 5–12)
NEUTROPHILS NFR BLD AUTO: 3.41 10*3/MM3 (ref 1.7–7)
NEUTROPHILS NFR BLD AUTO: 64 % (ref 42.7–76)
NRBC BLD AUTO-RTO: 0 /100 WBC (ref 0–0.2)
NT-PROBNP SERPL-MCNC: 936 PG/ML (ref 0–1800)
PLATELET # BLD AUTO: 250 10*3/MM3 (ref 140–450)
PMV BLD AUTO: 11.2 FL (ref 6–12)
POTASSIUM SERPL-SCNC: 3.7 MMOL/L (ref 3.5–5.2)
RBC # BLD AUTO: 4.06 10*6/MM3 (ref 3.77–5.28)
SODIUM SERPL-SCNC: 139 MMOL/L (ref 136–145)
TSH SERPL DL<=0.05 MIU/L-ACNC: 1.19 UIU/ML (ref 0.27–4.2)
WBC NRBC COR # BLD AUTO: 5.34 10*3/MM3 (ref 3.4–10.8)

## 2024-07-03 PROCEDURE — 83880 ASSAY OF NATRIURETIC PEPTIDE: CPT | Performed by: INTERNAL MEDICINE

## 2024-07-03 PROCEDURE — 85025 COMPLETE CBC W/AUTO DIFF WBC: CPT | Performed by: INTERNAL MEDICINE

## 2024-07-03 PROCEDURE — 80048 BASIC METABOLIC PNL TOTAL CA: CPT | Performed by: INTERNAL MEDICINE

## 2024-07-03 PROCEDURE — 93000 ELECTROCARDIOGRAM COMPLETE: CPT | Performed by: INTERNAL MEDICINE

## 2024-07-03 PROCEDURE — 99214 OFFICE O/P EST MOD 30 MIN: CPT | Performed by: INTERNAL MEDICINE

## 2024-07-03 PROCEDURE — 82728 ASSAY OF FERRITIN: CPT | Performed by: INTERNAL MEDICINE

## 2024-07-03 PROCEDURE — 36415 COLL VENOUS BLD VENIPUNCTURE: CPT

## 2024-07-03 PROCEDURE — 84443 ASSAY THYROID STIM HORMONE: CPT | Performed by: INTERNAL MEDICINE

## 2024-07-03 NOTE — PROGRESS NOTES
Date of Office Visit: 24  Encounter Provider: Melquiades Root MD  Place of Service: [facility] CARDIOLOGY  Patient Name: Vicky Stewart  :10/18/1933  [id number]    Chief Complaint   Patient presents with    Coronary Artery Disease   :     HPI: Vicky Stewart is a 89 y.o. female  who had inferolateral myocardial infarction in 2020.  At that time we stopped her STEMI and placed a 3.5 x 15 mm length and 3.5x 28 Xience eluding stent mid RCA.  She also has significant distal left main lesion some proximal LAD disease.  She is not really a candidate for surgical intervention.  We have been managing her medically.    In 2020 we ended up bringing her back to the Cath Lab for what we thought was recurrent angina and we did a rotational atherectomy and crushing drug-eluting stenting in the left main into the LAD and circumflex.  She is also had a history of a permanent pacemaker and atrial fibrillation.  Echocardiography in 2020 showed an ejection fraction of 40 to 45% with aortic sclerosis moderate to severe tricuspid insufficiency.  Normal pulmonary pressures.  She has some other small vessel coronary disease that we have managed medically.  She has COPD and its been difficult in the past trying to figure out if some of her shortness of breath is from that or from her heart.    She continues to have some shortness of breath she goes to see her lung doctors and they tell her that it is not from her lungs and that is from her heart or A. fib.  She could have small vessel disease but we tried her on nitrates long-acting that made her feel worse.    She is here for follow-up she is taking the clopidogrel 5 times a week complains a lot of nuisance bleeding because she is also having for.  She is not having PND orthopnea no edema she is cold all the time and she is fatigued all the time her blood pressure is usually better than what it is today she feels like she  has a little bit of a upper respiratory infection.  She is not complaining much of chest pain or shortness of breath      Past Medical History:   Diagnosis Date    Acute MI, inferior wall 03/2020    Anemia     Atrial fibrillation     Atrial flutter     AV block, 3rd degree     Carotid artery stenosis     Chronic systolic heart failure     Dermatitis     GERD (gastroesophageal reflux disease)     History of EKG 06/17/2016    Hyperlipidemia     Hypertension     Ischemic cardiomyopathy     Low back pain     Osteoarthritis     knee    Osteoporosis     Pacemaker     Palpitations     Persistent atrial fibrillation     on warfarin    Raynaud's phenomenon     Skin cancer 09/14/2022    SOB (shortness of breath) on exertion     SSS (sick sinus syndrome)     URI, acute     Venous insufficiency        Past Surgical History:   Procedure Laterality Date    BILE DUCT STENT PLACEMENT      choledochal    BREAST BIOPSY Left     benign    CARDIAC CATHETERIZATION N/A 03/14/2020    Procedure: Coronary angiography;  Surgeon: Melquiades Root MD;  Location: [facility] CATH INVASIVE LOCATION;  Service: Cardiovascular;  Laterality: N/A;    CARDIAC CATHETERIZATION  03/14/2020    Procedure: Percutaneous Manual Thrombectomy;  Surgeon: Melquiades Root MD;  Location: [facility] CATH INVASIVE LOCATION;  Service: Cardiovascular;;    CARDIAC CATHETERIZATION N/A 03/14/2020    Procedure: Stent CAROLYNN coronary;  Surgeon: Melquiades Root MD;  Location: [facility] CATH INVASIVE LOCATION;  Service: Cardiovascular;  Laterality: N/A;    CARDIAC CATHETERIZATION Right 07/09/2020    Procedure: Percutaneous Coronary Intervention;  Surgeon: Melquiades Root MD;  Location: [facility] CATH INVASIVE LOCATION;  Service: Cardiovascular;  Laterality: Right;  unprotected L main PCI.  8 Fr sheath in R CFA     CARDIAC CATHETERIZATION N/A 07/09/2020    Procedure: Stent CAROLYNN coronary;  Surgeon: Melquiades Root MD;  Location: [facility] CATH INVASIVE LOCATION;  Service: Cardiovascular;   Laterality: N/A;    CARDIAC CATHETERIZATION N/A 2020    Procedure: Atherectomy-coronary;  Surgeon: Melquiades Root MD;  Location:  [facility] CATH INVASIVE LOCATION;  Service: Cardiovascular;  Laterality: N/A;    CARDIAC ELECTROPHYSIOLOGY PROCEDURE N/A 2016    Procedure: Lead Revision PPM [place];  Surgeon: Kale Méndez MD;  Location:  [facility] CATH INVASIVE LOCATION;  Service:     CATARACT EXTRACTION W/ INTRAOCULAR LENS  IMPLANT, BILATERAL Bilateral     CHOLECYSTECTOMY      COLONOSCOPY  2012    FACIAL COSMETIC SURGERY      OTHER SURGICAL HISTORY      interrogation of implantable loop recorder remote, up to 30 days    PACEMAKER IMPLANTATION      PACEMAKER IMPLANTATION N/A 2024    Procedure: Pacemaker SC generator change  [place];  Surgeon: Kale Méndez MD;  Location:  [facility] CATH INVASIVE LOCATION;  Service: Cardiovascular;  Laterality: N/A;    SINUS SURGERY      VARICOSE VEIN SURGERY Bilateral        Social History     Socioeconomic History    Marital status:    Tobacco Use    Smoking status: Former     Current packs/day: 0.00     Average packs/day: 1.5 packs/day for 60.0 years (90.0 ttl pk-yrs)     Types: Cigarettes     Start date:      Quit date:      Years since quittin.5     Passive exposure: Past    Smokeless tobacco: Never    Tobacco comments:     CAFFEINE USE: 3 CUPS COFFEE DAILY   Substance and Sexual Activity    Alcohol use: No    Drug use: No    Sexual activity: Defer     Comment:        Family History   Problem Relation Age of Onset    Heart disease Mother     Stroke Mother     Heart disease Father     Diabetes Sister     Hodgkin's lymphoma Sister     Heart disease Sister         pacemaker    Heart disease Other     Stroke Other     Endometrial cancer Sister        Review of Systems   Constitutional: Negative for decreased appetite, fever, malaise/fatigue and weight loss.   HENT:  Negative for nosebleeds.    Eyes:  Negative for double vision.   Cardiovascular:   Negative for chest pain, claudication, cyanosis, dyspnea on exertion, irregular heartbeat, leg swelling, near-syncope, orthopnea, palpitations, paroxysmal nocturnal dyspnea and syncope.   Respiratory:  Negative for cough, hemoptysis and shortness of breath.    Hematologic/Lymphatic: Negative for bleeding problem.   Skin:  Negative for rash.   Musculoskeletal:  Negative for falls and myalgias.   Gastrointestinal:  Negative for hematochezia, jaundice, melena, nausea and vomiting.   Genitourinary:  Negative for hematuria.   Neurological:  Negative for dizziness and seizures.   Psychiatric/Behavioral:  Negative for altered mental status and memory loss.        Allergies   Allergen Reactions    Penicillins Swelling    Reclast [Zoledronic Acid] Nausea And Vomiting    Carvedilol Other (See Comments)     MAKES HER VERY COLD    Codeine Nausea And Vomiting    Lisinopril Dizziness    Ranexa [Ranolazine Er] Headache    Sulfa Antibiotics Nausea And Vomiting    Tramadol Nausea And Vomiting         Current Outpatient Medications:     acetaminophen (TYLENOL) 325 MG tablet, Take 1 tablet by mouth 2 (Two) Times a Day As Needed for Mild Pain., Disp: , Rfl:     atorvastatin (LIPITOR) 40 MG tablet, TAKE 1 TABLET BY MOUTH EVERY DAY, Disp: 90 tablet, Rfl: 0    carbidopa-levodopa (SINEMET)  MG per tablet, Take 1 tablet by mouth Every Night., Disp: , Rfl: 11    clopidogrel (PLAVIX) 75 MG tablet, TAKE 1 TABLET BY MOUTH EVERY DAY (Patient taking differently: Take 1 tablet by mouth. Takes 5 days a week), Disp: 90 tablet, Rfl: 3    fluticasone (FLONASE) 50 MCG/ACT nasal spray, 2 sprays into the nostril(s) as directed by provider Daily., Disp: , Rfl:     furosemide (LASIX) 40 MG tablet, TAKE 1 TABLET BY MOUTH EVERY DAY, Disp: 90 tablet, Rfl: 3    loratadine (CLARITIN) 10 MG tablet, Take 1 tablet by mouth Daily., Disp: , Rfl:     nitroglycerin (NITROSTAT) 0.4 MG SL tablet, PLACE 1 TABLET UNDER TONGUE EVERY 5 MINS, UP TO 3 DOSES AS NEEDED  "FOR CHEST PAIN, Disp: 100 tablet, Rfl: 11    pantoprazole (PROTONIX) 40 MG EC tablet, TAKE 1 TABLET BY MOUTH EVERY DAY, Disp: 90 tablet, Rfl: 3    saccharomyces boulardii (FLORASTOR) 250 MG capsule, Take 1 capsule by mouth 2 (Two) Times a Day., Disp: , Rfl:     spironolactone (ALDACTONE) 25 MG tablet, Take 1 tablet by mouth Daily., Disp: 90 tablet, Rfl: 2    temazepam (RESTORIL) 7.5 MG capsule, 1 capsule., Disp: , Rfl: 1    tobramycin 0.3 % solution ophthalmic solution, Administer 2 drops to both eyes Daily As Needed., Disp: , Rfl:     Umeclidinium-Vilanterol 62.5-25 MCG/INH aerosol powder , Inhale 1 puff Daily., Disp: , Rfl:     VENTOLIN  (90 BASE) MCG/ACT inhaler, INHALE 2 PUFFS BY MOUTH EVERY 4 HOURS AS NEEDED, Disp: , Rfl: 4    Vitamin D, Cholecalciferol, (CHOLECALCIFEROL) 10 MCG (400 UNIT) tablet, Take 1 tablet by mouth Daily., Disp: , Rfl:     warfarin (COUMADIN) 2.5 MG tablet, Atrial fibrillation  Indications: Atrial Fibrillation (Patient taking differently: Every Night. Atrial fibrillation  Indications: Atrial Fibrillation), Disp: 30 tablet, Rfl: 3    benzonatate (TESSALON) 100 MG capsule, Take 1 capsule by mouth 3 (Three) Times a Day As Needed for Cough. (Patient not taking: Reported on 7/3/2024), Disp: 30 capsule, Rfl: 0    Current Facility-Administered Medications:     sodium chloride 0.9 % flush 10 mL, 10 mL, Intravenous, PRN, Quita Mahoney R, APRN      Objective:     Vitals:    07/03/24 1359   BP: 156/90   Pulse: 80   Weight: 57.6 kg (127 lb)   Height: 154.9 cm (61\")     Body mass index is 24 kg/m².    Constitutional:       Appearance: Well-developed.   Eyes:      General: No scleral icterus.  HENT:      Head: Normocephalic.   Neck:      Thyroid: No thyromegaly.      Vascular: No JVD.      Lymphadenopathy: No cervical adenopathy.   Pulmonary:      Effort: Pulmonary effort is normal.      Breath sounds: Normal breath sounds. No wheezing. No rales.   Chest:       Cardiovascular:      Normal rate. " Regular rhythm.      No gallop.    Edema:     Peripheral edema absent.   Abdominal:      Palpations: Abdomen is soft.      Tenderness: There is no abdominal tenderness.   Musculoskeletal: Normal range of motion. Skin:     General: Skin is warm and dry.      Findings: No rash.   Neurological:      Mental Status: Alert and oriented to person, place, and time.           ECG 12 Lead    Date/Time: 7/3/2024 2:20 PM  Performed by: Melquiades Root MD    Authorized by: Melquiades Root MD  Comparison: compared with previous ECG   Similar to previous ECG  Rhythm: sinus rhythm and paced    Clinical impression: abnormal EKG           Assessment:       Diagnosis Plan   1. Permanent atrial fibrillation        2. Cardiac pacemaker in situ        3. Primary hypertension        4. Raynaud's phenomenon without gangrene        5. Coronary artery disease involving native coronary artery of native heart without angina pectoris        6. Other fatigue                 Plan:       Well and provide she is not having much chest discomfort or symptoms of heart failure.  With the cold and fatigue and being on 2 blood thinners I feel like we have to get a hemoglobin checked and when to go ahead and get a TSH checked and some other blood work I am not going to change anything else today her blood pressure is usually better than this at home further decisions will be based on the findings at the time of her blood work return    As always, it has been a pleasure to participate in your patient's care.      Sincerely,       Melquiades Root MD

## 2024-12-16 ENCOUNTER — TELEPHONE (OUTPATIENT)
Age: 89
End: 2024-12-16
Payer: MEDICARE

## 2024-12-16 NOTE — TELEPHONE ENCOUNTER
Pt with BSX single chamber pacemaker.     Hx of CAF and per notes, pt has had an AVN ablation.     NSVT event on 12/10/24 @ 22:55 that lasted for 38 beats with an average v.rate of 176 bpm.             Pt appears to have hx of NSVT events but wanted to make you all aware of this longer episode.     Pt's next appointment: 7/9/2025 with Dr. Root and a device check

## 2025-04-07 RX ORDER — PANTOPRAZOLE SODIUM 40 MG/1
40 TABLET, DELAYED RELEASE ORAL DAILY
Qty: 90 TABLET | Refills: 3 | Status: SHIPPED | OUTPATIENT
Start: 2025-04-07

## 2025-04-11 ENCOUNTER — HOSPITAL ENCOUNTER (OUTPATIENT)
Facility: HOSPITAL | Age: OVER 89
Discharge: HOME | End: 2025-04-11
Payer: MEDICARE

## 2025-04-11 ENCOUNTER — OFFICE VISIT (OUTPATIENT)
Age: OVER 89
End: 2025-04-11
Payer: MEDICARE

## 2025-04-11 VITALS
BODY MASS INDEX: 23.98 KG/M2 | RESPIRATION RATE: 16 BRPM | DIASTOLIC BLOOD PRESSURE: 80 MMHG | HEIGHT: 61 IN | WEIGHT: 127 LBS | SYSTOLIC BLOOD PRESSURE: 146 MMHG

## 2025-04-11 DIAGNOSIS — I65.23 BILATERAL CAROTID ARTERY STENOSIS: ICD-10-CM

## 2025-04-11 DIAGNOSIS — I65.23 CAROTID STENOSIS, BILATERAL: Primary | ICD-10-CM

## 2025-04-11 DIAGNOSIS — I65.23 BILATERAL CAROTID ARTERY OCCLUSION: ICD-10-CM

## 2025-04-11 PROBLEM — I65.29 CAROTID STENOSIS: Status: ACTIVE | Noted: 2025-04-11

## 2025-04-11 PROCEDURE — 93880 EXTRACRANIAL BILAT STUDY: CPT

## 2025-04-11 NOTE — PROGRESS NOTES
Chief Complaint  Carotid Artery Disease    Subjective        Vicky Stewart presents to [facility] VASCULAR SURGERY  HPI   Vicky Stewart is a 89 y.o. female that has been followed in our office for carotid artery stenosis and venous insufficiency.  She has a history of bilateral VenaSeal ablations as well as a right leg Varithena.  Duplex scan of carotid scan February 2024 indicates that she had a 50 to 69% carotic stenosis bilaterally.  Today's scan is no change from previous scan for her carotid arteries. She returns today in follow up along with a carotid duplex. She  reports she has been doing well without hospitalizations or surgeries. She denies any symptoms consistent with CVA, TIA, or amaurosis fugax.     Review of Systems   Constitutional:  Negative for fever.   Eyes:  Negative for visual disturbance.   Cardiovascular:  Negative for leg swelling.   Gastrointestinal:  Negative for abdominal pain.   Musculoskeletal:  Negative for back pain.   Skin:  Negative for color change, pallor and wound.   Neurological:  Negative for dizziness, facial asymmetry, speech difficulty and weakness.        Vicky Stewart  reports that she quit smoking about 14 years ago. Her smoking use included cigarettes. She started smoking about 74 years ago. She has a 90 pack-year smoking history. She has been exposed to tobacco smoke. She has never used smokeless tobacco..        Objective   Vital Signs:  Vitals:    04/11/25 1531   BP: 146/80   Resp:       Body mass index is 24.01 kg/m².   BMI is within normal parameters. No other follow-up for BMI required.       Physical Exam  Vitals reviewed.   Constitutional:       Appearance: Normal appearance.   HENT:      Head: Normocephalic.   Cardiovascular:      Rate and Rhythm: Normal rate and regular rhythm.      Pulses:           Dorsalis pedis pulses are 3+ on the right side and 3+ on the left side.        Posterior tibial pulses are 3+ on the right side and  3+ on the left side.   Pulmonary:      Effort: Pulmonary effort is normal.   Skin:     General: Skin is warm.   Neurological:      General: No focal deficit present.      Mental Status: She is alert and oriented to person, place, and time.   Psychiatric:         Mood and Affect: Mood normal.          Result Review :      Previous carotid duplex: Bilateral carotid 50 to 69% stenosis    Carotid duplex from today:   Duplex Carotid Ultrasound CAR (04/11/2025 14:52)                  Assessment and Plan     Diagnoses and all orders for this visit:    1. Carotid stenosis, bilateral (Primary)  -     Duplex Carotid Ultrasound CAR; Future    2. Bilateral carotid artery stenosis             Patient present today for follow up of carotid artery stenosis. She is to continue her warfarin and plavix. She is on a statin for cholesterol control.  We discussed adequate blood pressure control. She will return in 1 year along with a repeat carotid artery duplex.    Follow Up     Return in about 1 year (around 4/11/2026) for carotid duplex.  Patient was given instructions and counseling regarding her condition or for health maintenance advice. Please see specific information pulled into the AVS if appropriate.     RONEL Romero

## 2025-04-13 LAB
BH CV XLRA MEAS LEFT CAROTID BULB EDV: 63.9 CM/SEC
BH CV XLRA MEAS LEFT CAROTID BULB PSV: 317.6 CM/SEC
BH CV XLRA MEAS LEFT DIST CCA EDV: 21.2 CM/SEC
BH CV XLRA MEAS LEFT DIST CCA PSV: 112.1 CM/SEC
BH CV XLRA MEAS LEFT DIST ICA EDV: -20.3 CM/SEC
BH CV XLRA MEAS LEFT DIST ICA PSV: -69.4 CM/SEC
BH CV XLRA MEAS LEFT ICA/CCA RATIO: 2.83
BH CV XLRA MEAS LEFT MID CCA EDV: -15.2 CM/SEC
BH CV XLRA MEAS LEFT MID CCA PSV: -60.9 CM/SEC
BH CV XLRA MEAS LEFT MID ICA EDV: -47.9 CM/SEC
BH CV XLRA MEAS LEFT MID ICA PSV: -156.4 CM/SEC
BH CV XLRA MEAS LEFT PROX CCA EDV: 17.5 CM/SEC
BH CV XLRA MEAS LEFT PROX CCA PSV: 86.2 CM/SEC
BH CV XLRA MEAS LEFT PROX ECA EDV: 36.6 CM/SEC
BH CV XLRA MEAS LEFT PROX ECA PSV: 209.7 CM/SEC
BH CV XLRA MEAS LEFT PROX ICA EDV: -29.1 CM/SEC
BH CV XLRA MEAS LEFT PROX ICA PSV: -108.5 CM/SEC
BH CV XLRA MEAS LEFT PROX SCLA PSV: 91.9 CM/SEC
BH CV XLRA MEAS LEFT VERTEBRAL A EDV: -7 CM/SEC
BH CV XLRA MEAS LEFT VERTEBRAL A PSV: -37.1 CM/SEC
BH CV XLRA MEAS RIGHT CAROTID BULB EDV: 41.2 CM/SEC
BH CV XLRA MEAS RIGHT CAROTID BULB PSV: 209.9 CM/SEC
BH CV XLRA MEAS RIGHT DIST CCA EDV: 12.8 CM/SEC
BH CV XLRA MEAS RIGHT DIST CCA PSV: 62.4 CM/SEC
BH CV XLRA MEAS RIGHT DIST ICA EDV: -26.3 CM/SEC
BH CV XLRA MEAS RIGHT DIST ICA PSV: -93.3 CM/SEC
BH CV XLRA MEAS RIGHT ICA/CCA RATIO: 3.36
BH CV XLRA MEAS RIGHT MID CCA EDV: 12.3 CM/SEC
BH CV XLRA MEAS RIGHT MID CCA PSV: 58.5 CM/SEC
BH CV XLRA MEAS RIGHT MID ICA EDV: -20.3 CM/SEC
BH CV XLRA MEAS RIGHT MID ICA PSV: -86.7 CM/SEC
BH CV XLRA MEAS RIGHT PROX CCA EDV: 16.5 CM/SEC
BH CV XLRA MEAS RIGHT PROX CCA PSV: 129.4 CM/SEC
BH CV XLRA MEAS RIGHT PROX ECA EDV: -18.3 CM/SEC
BH CV XLRA MEAS RIGHT PROX ECA PSV: -163.4 CM/SEC
BH CV XLRA MEAS RIGHT PROX ICA EDV: 41.2 CM/SEC
BH CV XLRA MEAS RIGHT PROX ICA PSV: 209.9 CM/SEC
BH CV XLRA MEAS RIGHT PROX SCLA PSV: 153.7 CM/SEC
BH CV XLRA MEAS RIGHT VERTEBRAL A EDV: 11.9 CM/SEC
BH CV XLRA MEAS RIGHT VERTEBRAL A PSV: 59.5 CM/SEC
LEFT ARM BP: NORMAL MMHG
RIGHT ARM BP: NORMAL MMHG

## 2025-07-07 RX ORDER — CLOPIDOGREL BISULFATE 75 MG/1
75 TABLET ORAL DAILY
Qty: 90 TABLET | Refills: 3 | Status: SHIPPED | OUTPATIENT
Start: 2025-07-07

## 2025-07-09 ENCOUNTER — OFFICE VISIT (OUTPATIENT)
Age: OVER 89
End: 2025-07-09
Payer: MEDICARE

## 2025-07-09 ENCOUNTER — CLINICAL SUPPORT NO REQUIREMENTS (OUTPATIENT)
Age: OVER 89
End: 2025-07-09
Payer: MEDICARE

## 2025-07-09 VITALS
HEIGHT: 60 IN | HEART RATE: 80 BPM | SYSTOLIC BLOOD PRESSURE: 142 MMHG | OXYGEN SATURATION: 96 % | DIASTOLIC BLOOD PRESSURE: 76 MMHG | WEIGHT: 123 LBS | BODY MASS INDEX: 24.15 KG/M2

## 2025-07-09 DIAGNOSIS — I10 PRIMARY HYPERTENSION: ICD-10-CM

## 2025-07-09 DIAGNOSIS — R06.09 DOE (DYSPNEA ON EXERTION): ICD-10-CM

## 2025-07-09 DIAGNOSIS — I25.10 CORONARY ARTERY DISEASE INVOLVING NATIVE CORONARY ARTERY OF NATIVE HEART WITHOUT ANGINA PECTORIS: ICD-10-CM

## 2025-07-09 DIAGNOSIS — I48.21 PERMANENT ATRIAL FIBRILLATION: Primary | ICD-10-CM

## 2025-07-09 DIAGNOSIS — Z95.0 CARDIAC PACEMAKER IN SITU: ICD-10-CM

## 2025-07-09 PROCEDURE — 93000 ELECTROCARDIOGRAM COMPLETE: CPT | Performed by: INTERNAL MEDICINE

## 2025-07-09 PROCEDURE — 99214 OFFICE O/P EST MOD 30 MIN: CPT | Performed by: INTERNAL MEDICINE

## 2025-07-09 NOTE — PROGRESS NOTES
Date of Office Visit: 25  Encounter Provider: Melquiades Root MD  Place of Service: [facility] CARDIOLOGY  Patient Name: Vicky Stewart  :10/18/1933  [id number]    Chief Complaint   Patient presents with    Follow-up   :     HPI: Vicky Stewart is a 89 y.o. female  who had inferolateral myocardial infarction in 2020.  At that time we stopped her STEMI and placed a 3.5 x 15 mm length and 3.5x 28 Xience eluding stent mid RCA.  She also has significant distal left main lesion some proximal LAD disease.  She is not really a candidate for surgical intervention.  We have been managing her medically.    In 2020 we ended up bringing her back to the Cath Lab for what we thought was recurrent angina and we did a rotational atherectomy and crushing drug-eluting stenting in the left main into the LAD and circumflex.  She is also had a history of a permanent pacemaker and atrial fibrillation.  Echocardiography in 2020 showed an ejection fraction of 40 to 45% with aortic sclerosis moderate to severe tricuspid insufficiency.  Normal pulmonary pressures.  She has some other small vessel coronary disease that we have managed medically.  She has COPD and its been difficult in the past trying to figure out if some of her shortness of breath is from that or from her heart.    She continues to have some shortness of breath she goes to see her lung doctors and they tell her that it is not from her lungs and that is from her heart or A. fib.  She could have small vessel disease but we tried her on nitrates long-acting that made her feel worse.    She is here for follow-up and is complaining of being short of breath tired occasionally lightheaded she wonders if her legs are okay because they feel weak and she is a little unsteady on her feet no chest pain she had blood work done yesterday she does get a little bit of very mild anemia renal function was normal liver functions  normal    Past Medical History:   Diagnosis Date    Acute MI, inferior wall 03/2020    Anemia     Atrial fibrillation     Atrial flutter     AV block, 3rd degree     Carotid artery stenosis     Chronic systolic heart failure     Dermatitis     GERD (gastroesophageal reflux disease)     History of EKG 06/17/2016    Hyperlipidemia     Hypertension     Ischemic cardiomyopathy     Low back pain     Osteoarthritis     knee    Osteoporosis     Pacemaker     Palpitations     Persistent atrial fibrillation     on warfarin    Raynaud's phenomenon     Skin cancer 09/14/2022    SOB (shortness of breath) on exertion     SSS (sick sinus syndrome)     URI, acute     Venous insufficiency        Past Surgical History:   Procedure Laterality Date    BILE DUCT STENT PLACEMENT      choledochal    BREAST BIOPSY Left     benign    CARDIAC CATHETERIZATION N/A 03/14/2020    Procedure: Coronary angiography;  Surgeon: Melquiades Root MD;  Location: [facility] CATH INVASIVE LOCATION;  Service: Cardiovascular;  Laterality: N/A;    CARDIAC CATHETERIZATION  03/14/2020    Procedure: Percutaneous Manual Thrombectomy;  Surgeon: Melquiades Root MD;  Location: [facility] CATH INVASIVE LOCATION;  Service: Cardiovascular;;    CARDIAC CATHETERIZATION N/A 03/14/2020    Procedure: Stent CAROLYNN coronary;  Surgeon: Melquiades Root MD;  Location: [facility] CATH INVASIVE LOCATION;  Service: Cardiovascular;  Laterality: N/A;    CARDIAC CATHETERIZATION Right 07/09/2020    Procedure: Percutaneous Coronary Intervention;  Surgeon: Melquiades Root MD;  Location: [facility] CATH INVASIVE LOCATION;  Service: Cardiovascular;  Laterality: Right;  unprotected L main PCI.  8 Fr sheath in R CFA     CARDIAC CATHETERIZATION N/A 07/09/2020    Procedure: Stent CAROLYNN coronary;  Surgeon: Melquiades Root MD;  Location: [facility] CATH INVASIVE LOCATION;  Service: Cardiovascular;  Laterality: N/A;    CARDIAC CATHETERIZATION N/A 07/09/2020    Procedure: Atherectomy-coronary;  Surgeon: Dk  MD Melquiades;  Location: [facility] CATH INVASIVE LOCATION;  Service: Cardiovascular;  Laterality: N/A;    CARDIAC ELECTROPHYSIOLOGY PROCEDURE N/A 2016    Procedure: Lead Revision PPM XIAO;  Surgeon: Kale Méndez MD;  [facility];  Service:     CATARACT EXTRACTION W/ INTRAOCULAR LENS  IMPLANT, BILATERAL Bilateral     CHOLECYSTECTOMY      COLONOSCOPY  2012    FACIAL COSMETIC SURGERY      OTHER SURGICAL HISTORY      interrogation of implantable loop recorder remote, up to 30 days    PACEMAKER IMPLANTATION      PACEMAKER IMPLANTATION N/A 2024    Procedure: Pacemaker SC generator change  XIAO;  Surgeon: Kale Méndez MD;  Location: [facility] CATH INVASIVE LOCATION;  Service: Cardiovascular;  Laterality: N/A;    SINUS SURGERY      VARICOSE VEIN SURGERY Bilateral        Social History     Socioeconomic History    Marital status:    Tobacco Use    Smoking status: Former     Current packs/day: 0.00     Average packs/day: 1.5 packs/day for 60.0 years (90.0 ttl pk-yrs)     Types: Cigarettes     Start date:      Quit date:      Years since quittin.5     Passive exposure: Past    Smokeless tobacco: Never    Tobacco comments:     CAFFEINE USE: 3 CUPS COFFEE DAILY   Substance and Sexual Activity    Alcohol use: No    Drug use: No    Sexual activity: Defer     Comment:        Family History   Problem Relation Age of Onset    Heart disease Mother     Stroke Mother     Heart disease Father     Diabetes Sister     Hodgkin's lymphoma Sister     Heart disease Sister         pacemaker    Heart disease Other     Stroke Other     Endometrial cancer Sister        Review of Systems   Constitutional: Negative for decreased appetite, fever, malaise/fatigue and weight loss.   HENT:  Negative for nosebleeds.    Eyes:  Negative for double vision.   Cardiovascular:  Negative for chest pain, claudication, cyanosis, dyspnea on exertion, irregular heartbeat, leg swelling,  near-syncope, orthopnea, palpitations, paroxysmal nocturnal dyspnea and syncope.   Respiratory:  Negative for cough, hemoptysis and shortness of breath.    Hematologic/Lymphatic: Negative for bleeding problem.   Skin:  Negative for rash.   Musculoskeletal:  Negative for falls and myalgias.   Gastrointestinal:  Negative for hematochezia, jaundice, melena, nausea and vomiting.   Genitourinary:  Negative for hematuria.   Neurological:  Negative for dizziness and seizures.   Psychiatric/Behavioral:  Negative for altered mental status and memory loss.        Allergies   Allergen Reactions    Penicillins Swelling    Reclast [Zoledronic Acid] Nausea And Vomiting    Carvedilol Other (See Comments)     MAKES HER VERY COLD    Codeine Nausea And Vomiting    Lisinopril Dizziness    Ranexa [Ranolazine Er] Headache    Sulfa Antibiotics Nausea And Vomiting    Tramadol Nausea And Vomiting         Current Outpatient Medications:     acetaminophen (TYLENOL) 325 MG tablet, Take 1 tablet by mouth 2 (Two) Times a Day As Needed for Mild Pain., Disp: , Rfl:     atorvastatin (LIPITOR) 40 MG tablet, TAKE 1 TABLET BY MOUTH EVERY DAY, Disp: 90 tablet, Rfl: 0    benzonatate (TESSALON) 100 MG capsule, Take 1 capsule by mouth 3 (Three) Times a Day As Needed for Cough., Disp: 30 capsule, Rfl: 0    carbidopa-levodopa (SINEMET)  MG per tablet, Take 1 tablet by mouth Every Night., Disp: , Rfl: 11    clopidogrel (PLAVIX) 75 MG tablet, TAKE 1 TABLET BY MOUTH EVERY DAY, Disp: 90 tablet, Rfl: 3    fluticasone (FLONASE) 50 MCG/ACT nasal spray, Administer 2 sprays into the nostril(s) as directed by provider Daily., Disp: , Rfl:     furosemide (LASIX) 40 MG tablet, TAKE 1 TABLET BY MOUTH EVERY DAY, Disp: 90 tablet, Rfl: 3    loratadine (CLARITIN) 10 MG tablet, Take 1 tablet by mouth Daily., Disp: , Rfl:     nitroglycerin (NITROSTAT) 0.4 MG SL tablet, PLACE 1 TABLET UNDER TONGUE EVERY 5 MINS, UP TO 3 DOSES AS NEEDED FOR CHEST PAIN, Disp: 100 tablet,  "Rfl: 11    pantoprazole (PROTONIX) 40 MG EC tablet, TAKE 1 TABLET BY MOUTH EVERY DAY, Disp: 90 tablet, Rfl: 3    saccharomyces boulardii (FLORASTOR) 250 MG capsule, Take 1 capsule by mouth 2 (Two) Times a Day., Disp: , Rfl:     spironolactone (ALDACTONE) 25 MG tablet, Take 1 tablet by mouth Daily., Disp: 90 tablet, Rfl: 2    temazepam (RESTORIL) 7.5 MG capsule, 1 capsule., Disp: , Rfl: 1    VENTOLIN  (90 BASE) MCG/ACT inhaler, INHALE 2 PUFFS BY MOUTH EVERY 4 HOURS AS NEEDED, Disp: , Rfl: 4    Vitamin D, Cholecalciferol, (CHOLECALCIFEROL) 10 MCG (400 UNIT) tablet, Take 1 tablet by mouth Daily., Disp: , Rfl:     warfarin (COUMADIN) 2.5 MG tablet, Atrial fibrillation  Indications: Atrial Fibrillation (Patient taking differently: Every Night. Atrial fibrillation  Indications: Atrial Fibrillation), Disp: 30 tablet, Rfl: 3    tobramycin 0.3 % solution ophthalmic solution, Administer 2 drops to both eyes Daily As Needed. (Patient not taking: Reported on 7/9/2025), Disp: , Rfl:     Umeclidinium-Vilanterol 62.5-25 MCG/INH aerosol powder , Inhale 1 puff Daily. (Patient not taking: Reported on 7/9/2025), Disp: , Rfl:     Current Facility-Administered Medications:     sodium chloride 0.9 % flush 10 mL, 10 mL, Intravenous, PRN, Quita Mahoney R, APRN      Objective:     Vitals:    07/09/25 1237   BP: 142/76   BP Location: Left arm   Patient Position: Sitting   Cuff Size: Adult   Pulse: 80   SpO2: 96%   Weight: 55.8 kg (123 lb)   Height: 152.4 cm (60\")     Body mass index is 24.02 kg/m².    Constitutional:       Appearance: Well-developed.   Eyes:      General: No scleral icterus.  HENT:      Head: Normocephalic.   Neck:      Thyroid: No thyromegaly.      Vascular: No JVD.      Lymphadenopathy: No cervical adenopathy.   Pulmonary:      Effort: Pulmonary effort is normal.      Breath sounds: Normal breath sounds. No wheezing. No rales.   Chest:       Cardiovascular:      Normal rate. Regular rhythm.      No gallop.  "   Edema:     Peripheral edema absent.   Abdominal:      Palpations: Abdomen is soft.      Tenderness: There is no abdominal tenderness.   Musculoskeletal: Normal range of motion. Skin:     General: Skin is warm and dry.      Findings: No rash.   Neurological:      Mental Status: Alert and oriented to person, place, and time.           ECG 12 Lead    Date/Time: 7/9/2025 1:23 PM  Performed by: Melquiades Root MD    Authorized by: Melquiades Root MD  Comparison: compared with previous ECG   Similar to previous ECG  Rhythm: atrial fibrillation and paced    Clinical impression: abnormal EKG           Assessment:       Diagnosis Plan   1. Permanent atrial fibrillation        2. Cardiac pacemaker in situ        3. Coronary artery disease involving native coronary artery of native heart without angina pectoris        4. Primary hypertension                 Plan:       I do not know why she is short of breath it has been kind of a chronic issue.  I do not really think it is from her anemia I am not getting the sense that she is ischemic I am going to check her blood and make sure she is not holding onto fluid but I do not really hear anything.  She is kyphotic and possibly could have a little bit of restrictive lung disease.  She is very elderly and frail so I am not really anxious to do any kind of invasive testing.  I checked her legs she is got good pulses in her feet so I doubt she has significant PVD we will have her come back and see me in 3 months    As always, it has been a pleasure to participate in your patient's care.      Sincerely,       Melquiades Root MD

## 2025-07-10 LAB — NT-PROBNP SERPL-MCNC: 1554 PG/ML (ref 0–738)

## 2025-07-11 LAB
MC_CV_MDC_IDC_RATE_1: 160
MC_CV_MDC_IDC_ZONE_ID: 1
MC_CV_MDC_IDC_ZONE_ID: 2
MC_CV_MDC_IDC_ZONE_ID: 3
MDC_IDC_MSMT_BATTERY_STATUS: NORMAL
MDC_IDC_MSMT_LEADCHNL_RV_DTM: NORMAL
MDC_IDC_MSMT_LEADCHNL_RV_IMPEDANCE_VALUE: 613
MDC_IDC_MSMT_LEADCHNL_RV_PACING_THRESHOLD_AMPLITUDE: 0.8
MDC_IDC_MSMT_LEADCHNL_RV_PACING_THRESHOLD_PULSEWIDTH: 0.4
MDC_IDC_MSMT_LEADCHNL_RV_SENSING_INTR_AMPL: 40
MDC_IDC_PG_IMPLANT_DTM: NORMAL
MDC_IDC_PG_MFG: NORMAL
MDC_IDC_PG_MODEL: NORMAL
MDC_IDC_PG_SERIAL: NORMAL
MDC_IDC_PG_TYPE: NORMAL
MDC_IDC_SESS_DTM: NORMAL
MDC_IDC_SET_BRADY_LOWRATE: 80
MDC_IDC_SET_BRADY_MAX_SENSOR_RATE: 120
MDC_IDC_SET_BRADY_MODE: NORMAL
MDC_IDC_SET_LEADCHNL_RA_SENSING_SENSITIVITY: 0.75
MDC_IDC_SET_LEADCHNL_RV_PACING_AMPLITUDE: 3.5
MDC_IDC_SET_LEADCHNL_RV_PACING_PULSEWIDTH: 0.4
MDC_IDC_SET_LEADCHNL_RV_SENSING_SENSITIVITY: 2.5
MDC_IDC_SET_ZONE_STATUS: NORMAL
MDC_IDC_SET_ZONE_TYPE: NORMAL
MDC_IDC_STAT_BRADY_RV_PERCENT_PACED: 98

## 2025-07-11 RX ORDER — FUROSEMIDE 40 MG/1
40 TABLET ORAL 2 TIMES DAILY
Start: 2025-07-11

## 2025-07-16 ENCOUNTER — TRANSCRIBE ORDERS (OUTPATIENT)
Dept: ADMINISTRATIVE | Facility: HOSPITAL | Age: OVER 89
End: 2025-07-16
Payer: MEDICARE

## 2025-07-16 DIAGNOSIS — D64.9 ANEMIA, UNSPECIFIED TYPE: Primary | ICD-10-CM

## 2025-07-16 DIAGNOSIS — E83.10 DISORDER OF IRON METABOLISM: ICD-10-CM

## 2025-07-25 ENCOUNTER — HOSPITAL ENCOUNTER (OUTPATIENT)
Dept: INFUSION THERAPY | Facility: HOSPITAL | Age: OVER 89
Discharge: HOME | End: 2025-07-25
Payer: MEDICARE

## 2025-07-25 VITALS
OXYGEN SATURATION: 98 % | HEART RATE: 80 BPM | DIASTOLIC BLOOD PRESSURE: 61 MMHG | RESPIRATION RATE: 16 BRPM | TEMPERATURE: 96.8 F | SYSTOLIC BLOOD PRESSURE: 133 MMHG

## 2025-07-25 DIAGNOSIS — D64.9 ANEMIA, UNSPECIFIED TYPE: ICD-10-CM

## 2025-07-25 DIAGNOSIS — M81.0 AGE-RELATED OSTEOPOROSIS WITHOUT CURRENT PATHOLOGICAL FRACTURE: Primary | ICD-10-CM

## 2025-07-25 DIAGNOSIS — M81.0 SENILE OSTEOPOROSIS: ICD-10-CM

## 2025-07-25 PROCEDURE — 96374 THER/PROPH/DIAG INJ IV PUSH: CPT

## 2025-07-25 PROCEDURE — 25010000002 IRON SUCROSE PER 1 MG: Performed by: INTERNAL MEDICINE

## 2025-07-25 RX ADMIN — IRON SUCROSE 200 MG: 20 INJECTION, SOLUTION INTRAVENOUS at 15:24

## 2025-08-01 ENCOUNTER — HOSPITAL ENCOUNTER (OUTPATIENT)
Dept: INFUSION THERAPY | Facility: HOSPITAL | Age: OVER 89
Discharge: HOME | End: 2025-08-01
Payer: MEDICARE

## 2025-08-01 VITALS
RESPIRATION RATE: 16 BRPM | DIASTOLIC BLOOD PRESSURE: 87 MMHG | SYSTOLIC BLOOD PRESSURE: 142 MMHG | OXYGEN SATURATION: 97 % | TEMPERATURE: 96.9 F | HEART RATE: 79 BPM

## 2025-08-01 DIAGNOSIS — M81.0 AGE-RELATED OSTEOPOROSIS WITHOUT CURRENT PATHOLOGICAL FRACTURE: Primary | ICD-10-CM

## 2025-08-01 DIAGNOSIS — M81.0 SENILE OSTEOPOROSIS: ICD-10-CM

## 2025-08-01 PROCEDURE — 96374 THER/PROPH/DIAG INJ IV PUSH: CPT

## 2025-08-01 PROCEDURE — 25010000002 IRON SUCROSE PER 1 MG: Performed by: INTERNAL MEDICINE

## 2025-08-01 RX ADMIN — IRON SUCROSE 200 MG: 20 INJECTION, SOLUTION INTRAVENOUS at 11:12

## 2025-08-07 LAB
MC_CV_MDC_IDC_RATE_1: 160
MC_CV_MDC_IDC_ZONE_ID: 1
MDC_IDC_MSMT_BATTERY_REMAINING_LONGEVITY: 96 MO
MDC_IDC_MSMT_BATTERY_REMAINING_PERCENTAGE: 100 %
MDC_IDC_MSMT_BATTERY_STATUS: NORMAL
MDC_IDC_MSMT_LEADCHNL_RV_DTM: NORMAL
MDC_IDC_MSMT_LEADCHNL_RV_IMPEDANCE_VALUE: 589
MDC_IDC_MSMT_LEADCHNL_RV_PACING_THRESHOLD_AMPLITUDE: 1
MDC_IDC_MSMT_LEADCHNL_RV_PACING_THRESHOLD_POLARITY: NORMAL
MDC_IDC_MSMT_LEADCHNL_RV_PACING_THRESHOLD_PULSEWIDTH: 0.4
MDC_IDC_MSMT_LEADCHNL_RV_SENSING_INTR_AMPL: 17.7
MDC_IDC_PG_IMPLANT_DTM: NORMAL
MDC_IDC_PG_MFG: NORMAL
MDC_IDC_PG_MODEL: NORMAL
MDC_IDC_PG_SERIAL: NORMAL
MDC_IDC_PG_TYPE: NORMAL
MDC_IDC_SESS_DTM: NORMAL
MDC_IDC_SESS_TYPE: NORMAL
MDC_IDC_SET_BRADY_LOWRATE: 80
MDC_IDC_SET_BRADY_MAX_SENSOR_RATE: 120
MDC_IDC_SET_BRADY_MODE: NORMAL
MDC_IDC_SET_LEADCHNL_RV_PACING_AMPLITUDE: 1.5
MDC_IDC_SET_LEADCHNL_RV_PACING_POLARITY: NORMAL
MDC_IDC_SET_LEADCHNL_RV_PACING_PULSEWIDTH: 0.4
MDC_IDC_SET_LEADCHNL_RV_SENSING_POLARITY: NORMAL
MDC_IDC_SET_LEADCHNL_RV_SENSING_SENSITIVITY: 2.5
MDC_IDC_SET_ZONE_STATUS: NORMAL
MDC_IDC_SET_ZONE_TYPE: NORMAL
MDC_IDC_STAT_BRADY_RV_PERCENT_PACED: 98

## 2025-08-22 LAB
MC_CV_MDC_IDC_RATE_1: 160
MC_CV_MDC_IDC_ZONE_ID: 1
MDC_IDC_MSMT_BATTERY_REMAINING_LONGEVITY: 96 MO
MDC_IDC_MSMT_BATTERY_REMAINING_PERCENTAGE: 100 %
MDC_IDC_MSMT_BATTERY_STATUS: NORMAL
MDC_IDC_MSMT_LEADCHNL_RV_DTM: NORMAL
MDC_IDC_MSMT_LEADCHNL_RV_IMPEDANCE_VALUE: 589
MDC_IDC_MSMT_LEADCHNL_RV_PACING_THRESHOLD_AMPLITUDE: 1
MDC_IDC_MSMT_LEADCHNL_RV_PACING_THRESHOLD_POLARITY: NORMAL
MDC_IDC_MSMT_LEADCHNL_RV_PACING_THRESHOLD_PULSEWIDTH: 0.4
MDC_IDC_MSMT_LEADCHNL_RV_SENSING_INTR_AMPL: 17.7
MDC_IDC_PG_IMPLANT_DTM: NORMAL
MDC_IDC_PG_MFG: NORMAL
MDC_IDC_PG_MODEL: NORMAL
MDC_IDC_PG_SERIAL: NORMAL
MDC_IDC_PG_TYPE: NORMAL
MDC_IDC_SESS_DTM: NORMAL
MDC_IDC_SESS_TYPE: NORMAL
MDC_IDC_SET_BRADY_LOWRATE: 80
MDC_IDC_SET_BRADY_MAX_SENSOR_RATE: 120
MDC_IDC_SET_BRADY_MODE: NORMAL
MDC_IDC_SET_LEADCHNL_RV_PACING_AMPLITUDE: 1.5
MDC_IDC_SET_LEADCHNL_RV_PACING_POLARITY: NORMAL
MDC_IDC_SET_LEADCHNL_RV_PACING_PULSEWIDTH: 0.4
MDC_IDC_SET_LEADCHNL_RV_SENSING_POLARITY: NORMAL
MDC_IDC_SET_LEADCHNL_RV_SENSING_SENSITIVITY: 2.5
MDC_IDC_SET_ZONE_STATUS: NORMAL
MDC_IDC_SET_ZONE_TYPE: NORMAL
MDC_IDC_STAT_BRADY_RV_PERCENT_PACED: 98

## (undated) DEVICE — Device

## (undated) DEVICE — HI-TORQUE SUPRA CORE .035 PERIPHERAL GUIDE WIRE .035 X 145 CM: Brand: HI-TORQUE SUPRA CORE

## (undated) DEVICE — TREK CORONARY DILATATION CATHETER 3.50 MM X 15 MM / RAPID-EXCHANGE: Brand: TREK

## (undated) DEVICE — GLIDESHEATH BASIC HYDROPHILIC COATED INTRODUCER SHEATH: Brand: GLIDESHEATH

## (undated) DEVICE — 6F .070 JR 4 100CM: Brand: CORDIS

## (undated) DEVICE — GW HITORQUE/BAL MID/WT J W/HCOAT .014 3X190CM

## (undated) DEVICE — ANGIO-SEAL VIP VASCULAR CLOSURE DEVICE: Brand: ANGIO-SEAL

## (undated) DEVICE — SKIN PREP TRAY W/CHG: Brand: MEDLINE INDUSTRIES, INC.

## (undated) DEVICE — PK CATH CARD 40

## (undated) DEVICE — GC 7F 078 XB 3: Brand: VISTA BRITE TIP

## (undated) DEVICE — WR ROTOBLATOR 3.25MM

## (undated) DEVICE — MINI TREK CORONARY DILATATION CATHETER 2.0 MM X 12 MM / RAPID-EXCHANGE: Brand: MINI TREK

## (undated) DEVICE — NC TREK CORONARY DILATATION CATHETER 3.5 MM X 8 MM / RAPID-EXCHANGE: Brand: NC TREK

## (undated) DEVICE — CATH DIAG IMPULSE FR4 5F 100CM

## (undated) DEVICE — NC TREK CORONARY DILATATION CATHETER 3.5 MM X 12 MM / RAPID-EXCHANGE: Brand: NC TREK

## (undated) DEVICE — GW EMR FIX EXCHG J STD .035 3MM 260CM

## (undated) DEVICE — HI-TORQUE WHISPER MS GUIDE WIRE .014 STRAIGHT TIP 3.0 CM X 190 CM: Brand: HI-TORQUE WHISPER

## (undated) DEVICE — CATH DIAG IMPULSE FL3.5 5F 100CM

## (undated) DEVICE — EXT GW DOC 1 .014/.018 145CM

## (undated) DEVICE — INTRO SHEATH ART/FEM ENGAGE .035 7F12CM

## (undated) DEVICE — KT MANIFLD CARDIAC

## (undated) DEVICE — PTCA DILATATION CATHETER: Brand: APEX™

## (undated) DEVICE — BLD E/S PHOTONBLADE ILLUM W/ADAPT/SMOKE/EVAC

## (undated) DEVICE — CATH ASPIR EXPORTADVANCE 6F .014IN 140CM

## (undated) DEVICE — TREK CORONARY DILATATION CATHETER 3.50 MM X 20 MM / RAPID-EXCHANGE: Brand: TREK

## (undated) DEVICE — PK DEFIB PACE 40

## (undated) DEVICE — DEV INDEFLATOR

## (undated) DEVICE — RADIFOCUS GLIDEWIRE: Brand: GLIDEWIRE

## (undated) DEVICE — TREK CORONARY DILATATION CATHETER 2.50 MM X 15 MM / OVER-THE-WIRE: Brand: TREK

## (undated) DEVICE — EXCHANGEABLE BURR CATHETER: Brand: ROTALINK™ BURR

## (undated) DEVICE — INTRO SHEATH ART/FEM ENGAGE .035 8F12CM

## (undated) DEVICE — HI-TORQUE WHISPER MS GUIDE WIRE .014 J TIP 3.0 CM X 190 CM: Brand: HI-TORQUE WHISPER

## (undated) DEVICE — NC TREK CORONARY DILATATION CATHETER 2.5 MM X 20 MM / RAPID-EXCHANGE: Brand: NC TREK